# Patient Record
Sex: FEMALE | Race: WHITE | NOT HISPANIC OR LATINO
[De-identification: names, ages, dates, MRNs, and addresses within clinical notes are randomized per-mention and may not be internally consistent; named-entity substitution may affect disease eponyms.]

---

## 2015-01-06 RX ORDER — INSULIN GLARGINE 100 [IU]/ML
46 INJECTION, SOLUTION SUBCUTANEOUS
Qty: 0 | Refills: 0 | COMMUNITY
Start: 2015-01-06

## 2015-01-06 RX ORDER — INSULIN GLARGINE 100 [IU]/ML
30 INJECTION, SOLUTION SUBCUTANEOUS
Qty: 0 | Refills: 0 | COMMUNITY
Start: 2015-01-06

## 2015-04-30 RX ORDER — INSULIN LISPRO 100/ML
12 VIAL (ML) SUBCUTANEOUS
Qty: 0 | Refills: 0 | COMMUNITY
Start: 2015-04-30

## 2015-04-30 RX ORDER — INSULIN LISPRO 100/ML
15 VIAL (ML) SUBCUTANEOUS
Qty: 0 | Refills: 0 | COMMUNITY
Start: 2015-04-30

## 2017-06-07 ENCOUNTER — APPOINTMENT (OUTPATIENT)
Dept: BARIATRICS | Facility: CLINIC | Age: 52
End: 2017-06-07

## 2017-06-07 VITALS
WEIGHT: 293 LBS | BODY MASS INDEX: 48.23 KG/M2 | HEIGHT: 65.5 IN | DIASTOLIC BLOOD PRESSURE: 86 MMHG | HEART RATE: 80 BPM | SYSTOLIC BLOOD PRESSURE: 138 MMHG | TEMPERATURE: 98 F

## 2017-06-07 DIAGNOSIS — E78.00 PURE HYPERCHOLESTEROLEMIA, UNSPECIFIED: ICD-10-CM

## 2017-06-07 DIAGNOSIS — Z78.9 OTHER SPECIFIED HEALTH STATUS: ICD-10-CM

## 2017-06-07 DIAGNOSIS — E11.9 TYPE 2 DIABETES MELLITUS W/OUT COMPLICATIONS: ICD-10-CM

## 2017-06-07 DIAGNOSIS — Z84.1 FAMILY HISTORY OF DISORDERS OF KIDNEY AND URETER: ICD-10-CM

## 2017-06-07 DIAGNOSIS — Z80.1 FAMILY HISTORY OF MALIGNANT NEOPLASM OF TRACHEA, BRONCHUS AND LUNG: ICD-10-CM

## 2017-06-07 DIAGNOSIS — Z86.39 PERSONAL HISTORY OF OTHER ENDOCRINE, NUTRITIONAL AND METABOLIC DISEASE: ICD-10-CM

## 2017-06-07 DIAGNOSIS — E66.01 MORBID (SEVERE) OBESITY DUE TO EXCESS CALORIES: ICD-10-CM

## 2017-06-07 DIAGNOSIS — F15.90 OTHER STIMULANT USE, UNSPECIFIED, UNCOMPLICATED: ICD-10-CM

## 2017-06-07 DIAGNOSIS — Z83.3 FAMILY HISTORY OF DIABETES MELLITUS: ICD-10-CM

## 2017-06-07 DIAGNOSIS — Z82.49 FAMILY HISTORY OF ISCHEMIC HEART DISEASE AND OTHER DISEASES OF THE CIRCULATORY SYSTEM: ICD-10-CM

## 2017-06-07 RX ORDER — BENAZEPRIL HYDROCHLORIDE AND HYDROCHLOROTHIAZIDE 20; 25 MG/1; MG/1
20-25 TABLET, FILM COATED ORAL
Refills: 0 | Status: ACTIVE | COMMUNITY

## 2017-06-07 RX ORDER — INSULIN GLARGINE 300 U/ML
300 INJECTION, SOLUTION SUBCUTANEOUS
Refills: 0 | Status: ACTIVE | COMMUNITY
Start: 2017-06-07

## 2017-06-07 RX ORDER — EMPAGLIFLOZIN 25 MG/1
25 TABLET, FILM COATED ORAL
Refills: 0 | Status: ACTIVE | COMMUNITY

## 2017-06-07 RX ORDER — SIMVASTATIN 80 MG/1
TABLET, FILM COATED ORAL
Refills: 0 | Status: ACTIVE | COMMUNITY

## 2017-06-07 RX ORDER — DULAGLUTIDE 1.5 MG/.5ML
1.5 INJECTION, SOLUTION SUBCUTANEOUS
Refills: 0 | Status: ACTIVE | COMMUNITY

## 2017-06-07 RX ORDER — INSULIN LISPRO 100 [IU]/ML
100 INJECTION, SOLUTION INTRAVENOUS; SUBCUTANEOUS
Refills: 0 | Status: ACTIVE | COMMUNITY

## 2017-06-07 RX ORDER — ERGOCALCIFEROL (VITAMIN D2) 1250 MCG
50000 CAPSULE ORAL
Refills: 0 | Status: ACTIVE | COMMUNITY

## 2017-06-07 RX ORDER — SERTRALINE HYDROCHLORIDE 100 MG/1
100 TABLET, FILM COATED ORAL
Refills: 0 | Status: ACTIVE | COMMUNITY

## 2017-06-07 RX ORDER — ASPIRIN 81 MG/1
81 TABLET ORAL
Refills: 0 | Status: ACTIVE | COMMUNITY
Start: 2017-06-07

## 2017-06-09 LAB
25(OH)D3 SERPL-MCNC: 41.9 NG/ML
ALBUMIN SERPL ELPH-MCNC: 4.3 G/DL
ALP BLD-CCNC: 63 U/L
ALT SERPL-CCNC: 15 U/L
ANION GAP SERPL CALC-SCNC: 23 MMOL/L
APTT BLD: 31.7 SEC
AST SERPL-CCNC: 20 U/L
BASOPHILS # BLD AUTO: 0.03 K/UL
BASOPHILS NFR BLD AUTO: 0.3 %
BILIRUB SERPL-MCNC: 1.2 MG/DL
BUN SERPL-MCNC: 14 MG/DL
CALCIUM SERPL-MCNC: 10.7 MG/DL
CHLORIDE SERPL-SCNC: 99 MMOL/L
CHOLEST SERPL-MCNC: 141 MG/DL
CHOLEST/HDLC SERPL: 3 RATIO
CO2 SERPL-SCNC: 21 MMOL/L
CREAT SERPL-MCNC: 0.91 MG/DL
EOSINOPHIL # BLD AUTO: 0.42 K/UL
EOSINOPHIL NFR BLD AUTO: 4 %
FERRITIN SERPL-MCNC: 30 NG/ML
FOLATE SERPL-MCNC: 15 NG/ML
GLUCOSE SERPL-MCNC: 97 MG/DL
HBA1C MFR BLD HPLC: 6.6 %
HCT VFR BLD CALC: 46.9 %
HDLC SERPL-MCNC: 47 MG/DL
HGB BLD-MCNC: 14.7 G/DL
IMM GRANULOCYTES NFR BLD AUTO: 0.2 %
INR PPP: 0.93 RATIO
IRON SATN MFR SERPL: 16 %
IRON SERPL-MCNC: 56 UG/DL
LDLC SERPL CALC-MCNC: 55 MG/DL
LYMPHOCYTES # BLD AUTO: 2.79 K/UL
LYMPHOCYTES NFR BLD AUTO: 26.5 %
MAN DIFF?: NORMAL
MCHC RBC-ENTMCNC: 29.7 PG
MCHC RBC-ENTMCNC: 31.3 GM/DL
MCV RBC AUTO: 94.7 FL
MONOCYTES # BLD AUTO: 0.74 K/UL
MONOCYTES NFR BLD AUTO: 7 %
NEUTROPHILS # BLD AUTO: 6.53 K/UL
NEUTROPHILS NFR BLD AUTO: 62 %
PLATELET # BLD AUTO: 318 K/UL
POTASSIUM SERPL-SCNC: 4.3 MMOL/L
PROT SERPL-MCNC: 7.8 G/DL
PT BLD: 10.5 SEC
RBC # BLD: 4.95 M/UL
RBC # FLD: 13.7 %
SODIUM SERPL-SCNC: 143 MMOL/L
T4 SERPL-MCNC: 6 UG/DL
TIBC SERPL-MCNC: 352 UG/DL
TRIGL SERPL-MCNC: 193 MG/DL
TSH SERPL-ACNC: 0.62 UIU/ML
UIBC SERPL-MCNC: 296 UG/DL
VIT B12 SERPL-MCNC: 349 PG/ML
WBC # FLD AUTO: 10.53 K/UL

## 2017-06-12 LAB
VIT B1 SERPL-MCNC: 163.6 NMOL/L
VIT B6 SERPL-MCNC: 14.9 UG/L

## 2017-06-23 ENCOUNTER — OTHER (OUTPATIENT)
Age: 52
End: 2017-06-23

## 2017-07-26 ENCOUNTER — EMERGENCY (EMERGENCY)
Facility: HOSPITAL | Age: 52
LOS: 1 days | Discharge: HOME | End: 2017-07-26
Attending: EMERGENCY MEDICINE
Payer: COMMERCIAL

## 2017-07-26 VITALS
TEMPERATURE: 98 F | DIASTOLIC BLOOD PRESSURE: 64 MMHG | RESPIRATION RATE: 18 BRPM | OXYGEN SATURATION: 95 % | HEART RATE: 83 BPM | SYSTOLIC BLOOD PRESSURE: 103 MMHG

## 2017-07-26 VITALS
SYSTOLIC BLOOD PRESSURE: 127 MMHG | RESPIRATION RATE: 22 BRPM | TEMPERATURE: 98 F | HEART RATE: 103 BPM | OXYGEN SATURATION: 96 % | HEIGHT: 65 IN | DIASTOLIC BLOOD PRESSURE: 76 MMHG | WEIGHT: 293 LBS

## 2017-07-26 LAB
ANION GAP SERPL CALC-SCNC: 13 MMOL/L — SIGNIFICANT CHANGE UP (ref 5–17)
ANION GAP SERPL CALC-SCNC: 9 MMOL/L — SIGNIFICANT CHANGE UP (ref 5–17)
BASOPHILS # BLD AUTO: 0.1 K/UL — SIGNIFICANT CHANGE UP (ref 0–0.2)
BASOPHILS NFR BLD AUTO: 1.2 % — SIGNIFICANT CHANGE UP (ref 0–2)
BUN SERPL-MCNC: 16 MG/DL — SIGNIFICANT CHANGE UP (ref 7–18)
BUN SERPL-MCNC: 17 MG/DL — SIGNIFICANT CHANGE UP (ref 7–18)
CALCIUM SERPL-MCNC: 9.6 MG/DL — SIGNIFICANT CHANGE UP (ref 8.4–10.5)
CALCIUM SERPL-MCNC: 9.8 MG/DL — SIGNIFICANT CHANGE UP (ref 8.4–10.5)
CHLORIDE SERPL-SCNC: 106 MMOL/L — SIGNIFICANT CHANGE UP (ref 96–108)
CHLORIDE SERPL-SCNC: 107 MMOL/L — SIGNIFICANT CHANGE UP (ref 96–108)
CK MB CFR SERPL CALC: 1.8 NG/ML — SIGNIFICANT CHANGE UP (ref 0–3.6)
CO2 SERPL-SCNC: 22 MMOL/L — SIGNIFICANT CHANGE UP (ref 22–31)
CO2 SERPL-SCNC: 27 MMOL/L — SIGNIFICANT CHANGE UP (ref 22–31)
CREAT SERPL-MCNC: 0.93 MG/DL — SIGNIFICANT CHANGE UP (ref 0.5–1.3)
CREAT SERPL-MCNC: 1.01 MG/DL — SIGNIFICANT CHANGE UP (ref 0.5–1.3)
EOSINOPHIL # BLD AUTO: 0.4 K/UL — SIGNIFICANT CHANGE UP (ref 0–0.5)
EOSINOPHIL NFR BLD AUTO: 4.6 % — SIGNIFICANT CHANGE UP (ref 0–6)
GLUCOSE SERPL-MCNC: 127 MG/DL — HIGH (ref 70–99)
GLUCOSE SERPL-MCNC: 147 MG/DL — HIGH (ref 70–99)
HCT VFR BLD CALC: 42.8 % — SIGNIFICANT CHANGE UP (ref 34.5–45)
HGB BLD-MCNC: 14.8 G/DL — SIGNIFICANT CHANGE UP (ref 11.5–15.5)
LYMPHOCYTES # BLD AUTO: 2.9 K/UL — SIGNIFICANT CHANGE UP (ref 1–3.3)
LYMPHOCYTES # BLD AUTO: 29.8 % — SIGNIFICANT CHANGE UP (ref 13–44)
MCHC RBC-ENTMCNC: 30.5 PG — SIGNIFICANT CHANGE UP (ref 27–34)
MCHC RBC-ENTMCNC: 34.7 GM/DL — SIGNIFICANT CHANGE UP (ref 32–36)
MCV RBC AUTO: 87.9 FL — SIGNIFICANT CHANGE UP (ref 80–100)
MONOCYTES # BLD AUTO: 0.7 K/UL — SIGNIFICANT CHANGE UP (ref 0–0.9)
MONOCYTES NFR BLD AUTO: 6.7 % — SIGNIFICANT CHANGE UP (ref 2–14)
NEUTROPHILS # BLD AUTO: 5.6 K/UL — SIGNIFICANT CHANGE UP (ref 1.8–7.4)
NEUTROPHILS NFR BLD AUTO: 57.6 % — SIGNIFICANT CHANGE UP (ref 43–77)
NT-PROBNP SERPL-SCNC: 222 PG/ML — HIGH (ref 0–125)
PLATELET # BLD AUTO: 249 K/UL — SIGNIFICANT CHANGE UP (ref 150–400)
POTASSIUM SERPL-MCNC: 2.9 MMOL/L — CRITICAL LOW (ref 3.5–5.3)
POTASSIUM SERPL-MCNC: 3.4 MMOL/L — LOW (ref 3.5–5.3)
POTASSIUM SERPL-SCNC: 2.9 MMOL/L — CRITICAL LOW (ref 3.5–5.3)
POTASSIUM SERPL-SCNC: 3.4 MMOL/L — LOW (ref 3.5–5.3)
RBC # BLD: 4.86 M/UL — SIGNIFICANT CHANGE UP (ref 3.8–5.2)
RBC # FLD: 13.1 % — SIGNIFICANT CHANGE UP (ref 10.3–14.5)
SODIUM SERPL-SCNC: 141 MMOL/L — SIGNIFICANT CHANGE UP (ref 135–145)
SODIUM SERPL-SCNC: 143 MMOL/L — SIGNIFICANT CHANGE UP (ref 135–145)
TROPONIN I SERPL-MCNC: <0.015 NG/ML — SIGNIFICANT CHANGE UP (ref 0–0.04)
TROPONIN I SERPL-MCNC: <0.015 NG/ML — SIGNIFICANT CHANGE UP (ref 0–0.04)
WBC # BLD: 9.7 K/UL — SIGNIFICANT CHANGE UP (ref 3.8–10.5)
WBC # FLD AUTO: 9.7 K/UL — SIGNIFICANT CHANGE UP (ref 3.8–10.5)

## 2017-07-26 PROCEDURE — 93005 ELECTROCARDIOGRAM TRACING: CPT

## 2017-07-26 PROCEDURE — 80048 BASIC METABOLIC PNL TOTAL CA: CPT

## 2017-07-26 PROCEDURE — 71260 CT THORAX DX C+: CPT | Mod: 26

## 2017-07-26 PROCEDURE — 99285 EMERGENCY DEPT VISIT HI MDM: CPT

## 2017-07-26 PROCEDURE — 94640 AIRWAY INHALATION TREATMENT: CPT

## 2017-07-26 PROCEDURE — 71020: CPT | Mod: 26

## 2017-07-26 PROCEDURE — 71260 CT THORAX DX C+: CPT

## 2017-07-26 PROCEDURE — 84484 ASSAY OF TROPONIN QUANT: CPT

## 2017-07-26 PROCEDURE — 82553 CREATINE MB FRACTION: CPT

## 2017-07-26 PROCEDURE — 83880 ASSAY OF NATRIURETIC PEPTIDE: CPT

## 2017-07-26 PROCEDURE — 99284 EMERGENCY DEPT VISIT MOD MDM: CPT | Mod: 25

## 2017-07-26 PROCEDURE — 96374 THER/PROPH/DIAG INJ IV PUSH: CPT | Mod: XU

## 2017-07-26 PROCEDURE — 71046 X-RAY EXAM CHEST 2 VIEWS: CPT

## 2017-07-26 PROCEDURE — 85027 COMPLETE CBC AUTOMATED: CPT

## 2017-07-26 RX ORDER — POTASSIUM CHLORIDE 20 MEQ
20 PACKET (EA) ORAL ONCE
Qty: 0 | Refills: 0 | Status: COMPLETED | OUTPATIENT
Start: 2017-07-26 | End: 2017-07-26

## 2017-07-26 RX ORDER — MAGNESIUM SULFATE 500 MG/ML
2 VIAL (ML) INJECTION ONCE
Qty: 0 | Refills: 0 | Status: COMPLETED | OUTPATIENT
Start: 2017-07-26 | End: 2017-07-26

## 2017-07-26 RX ORDER — ALBUTEROL 90 UG/1
2.5 AEROSOL, METERED ORAL ONCE
Qty: 0 | Refills: 0 | Status: COMPLETED | OUTPATIENT
Start: 2017-07-26 | End: 2017-07-26

## 2017-07-26 RX ADMIN — ALBUTEROL 2.5 MILLIGRAM(S): 90 AEROSOL, METERED ORAL at 14:20

## 2017-07-26 RX ADMIN — Medication 50 GRAM(S): at 14:20

## 2017-07-26 RX ADMIN — Medication 20 MILLIEQUIVALENT(S): at 17:25

## 2017-07-26 NOTE — ED PROVIDER NOTE - PROGRESS NOTE DETAILS
kaur: no resp distress.  pt xray shows a hyperdense area at right hilum area- will obtain a ct chest with iv contrast. repeat K shows 3.4.  kcl 20meq given. 1st set of trop neg.  pending 2nd S.O. from Dr. JIA kaur f/u CT chest, pt with nonspecific 3mm nodule. Pt in no distress, no shortness of breath, no chest pain . will f/u with Dr. Christine for pulmonary consult and repeat CT in 9-12 months as per radiologist.   Pt is well appearing walking with normal gait, stable for discharge and follow up with medical doctor. Pt educated on care and need for follow up. Discussed anticipatory guidance and return precautions. Questions answered. I had a detailed discussion with the patient and/or guardian regarding the historical points, exam findings, and any diagnostic results supporting the discharge diagnosis.

## 2017-07-26 NOTE — ED ADULT NURSE NOTE - OBJECTIVE STATEMENT
pt BIBEMS c/o of wheezing with SOB at doctor's office today pt is alert awake oriented x3 shallow breathing with wheezing noted throughout no coughing at this time

## 2017-07-26 NOTE — ED PROVIDER NOTE - MEDICAL DECISION MAKING DETAILS
52 yr old female with hx of HTN, HLD, DM and ? asthma? presents to ed c/o SOB and wheezing x 1 wk worsening.  no fever, no cp, no palpitations, no smoking hx, no diaphoresis, no n/v.  pt was at MD office today and given albuterol x 2 nebs and 80mg solumedrol IM.  EMS arrived and gave pt 2 duoneb and pt showed significant improvement.  speaking clearly in full sentences and good air entry.  denies any uri sx, smoke exposure, dvt/pe, recent surgery.     asthmatic sx r/o cardiac vs pna vs copd vs asthma

## 2017-07-26 NOTE — ED ADULT TRIAGE NOTE - CHIEF COMPLAINT QUOTE
biba sent from pmd office for sob / wheezing . ems reports pt was given albuterol x 2 and solumedrol 80 mg IM at the doctor's office and duonebs x 2 by ems

## 2017-07-26 NOTE — ED PROVIDER NOTE - CONSTITUTIONAL, MLM
normal... well nourished, awake, alert, oriented to person, place, time/situation and in mild apparent distress.

## 2017-07-29 DIAGNOSIS — Z79.2 LONG TERM (CURRENT) USE OF ANTIBIOTICS: ICD-10-CM

## 2017-07-29 DIAGNOSIS — I10 ESSENTIAL (PRIMARY) HYPERTENSION: ICD-10-CM

## 2017-07-29 DIAGNOSIS — Z79.82 LONG TERM (CURRENT) USE OF ASPIRIN: ICD-10-CM

## 2017-07-29 DIAGNOSIS — E11.9 TYPE 2 DIABETES MELLITUS WITHOUT COMPLICATIONS: ICD-10-CM

## 2017-07-29 DIAGNOSIS — R06.00 DYSPNEA, UNSPECIFIED: ICD-10-CM

## 2017-07-29 DIAGNOSIS — R91.1 SOLITARY PULMONARY NODULE: ICD-10-CM

## 2017-07-29 DIAGNOSIS — R06.2 WHEEZING: ICD-10-CM

## 2017-07-29 DIAGNOSIS — Z79.4 LONG TERM (CURRENT) USE OF INSULIN: ICD-10-CM

## 2017-07-29 DIAGNOSIS — E78.5 HYPERLIPIDEMIA, UNSPECIFIED: ICD-10-CM

## 2018-02-13 ENCOUNTER — INPATIENT (INPATIENT)
Facility: HOSPITAL | Age: 53
LOS: 7 days | Discharge: HOME | DRG: 638 | End: 2018-02-21
Attending: INTERNAL MEDICINE | Admitting: INTERNAL MEDICINE
Payer: MEDICARE

## 2018-02-13 VITALS
RESPIRATION RATE: 16 BRPM | DIASTOLIC BLOOD PRESSURE: 77 MMHG | SYSTOLIC BLOOD PRESSURE: 134 MMHG | TEMPERATURE: 98 F | HEART RATE: 80 BPM | HEIGHT: 66 IN | OXYGEN SATURATION: 96 % | WEIGHT: 289.91 LBS

## 2018-02-13 RX ORDER — SODIUM CHLORIDE 9 MG/ML
3 INJECTION INTRAMUSCULAR; INTRAVENOUS; SUBCUTANEOUS ONCE
Qty: 0 | Refills: 0 | Status: COMPLETED | OUTPATIENT
Start: 2018-02-13 | End: 2018-02-13

## 2018-02-13 NOTE — ED PROVIDER NOTE - OBJECTIVE STATEMENT
51 y/o F pt w/ PMHx of DM (Meloxicam 15 mg x 1 daily, Jardiance x 25 mg, aspirin x 81 mg, Sertraline HCl 100 mg, Metropolol Succina 50 mg, Simvastin 40 mg, Benazepril/HCT 20 12.5 mg, Metformin HCl 850 mg, Trulicity, Toujeo Solostar) foot ulcers, neuropathy secondary  HTN c/o L ankle swelling. Reports intermittent fever since yesterday. Denies any other complaints. Allergic to iodinated radiocontrast agents.

## 2018-02-13 NOTE — ED PROVIDER NOTE - CARE PLAN
Principal Discharge DX:	Diabetic foot infection  Secondary Diagnosis:	Uncontrolled diabetes mellitus

## 2018-02-13 NOTE — ED ADULT TRIAGE NOTE - CHIEF COMPLAINT QUOTE
pt c/o swelling, redness and pain in left foot, pt states she has foot ulcers in both feet and she is diabetic

## 2018-02-13 NOTE — ED PROVIDER NOTE - MEDICAL DECISION MAKING DETAILS
MAR endorsed. Pt agrees with admission for glucose monitoring and IV abx. I had a detailed discussion with the patient and/or guardian regarding the historical points, exam findings, and any diagnostic results supporting the admit diagnosis.

## 2018-02-14 DIAGNOSIS — I10 ESSENTIAL (PRIMARY) HYPERTENSION: ICD-10-CM

## 2018-02-14 DIAGNOSIS — L03.031 CELLULITIS OF RIGHT TOE: ICD-10-CM

## 2018-02-14 DIAGNOSIS — E11.65 TYPE 2 DIABETES MELLITUS WITH HYPERGLYCEMIA: ICD-10-CM

## 2018-02-14 DIAGNOSIS — Z29.9 ENCOUNTER FOR PROPHYLACTIC MEASURES, UNSPECIFIED: ICD-10-CM

## 2018-02-14 DIAGNOSIS — E11.69 TYPE 2 DIABETES MELLITUS WITH OTHER SPECIFIED COMPLICATION: ICD-10-CM

## 2018-02-14 DIAGNOSIS — E11.9 TYPE 2 DIABETES MELLITUS WITHOUT COMPLICATIONS: ICD-10-CM

## 2018-02-14 LAB
ACETONE SERPL-MCNC: NEGATIVE — SIGNIFICANT CHANGE UP
ALBUMIN SERPL ELPH-MCNC: 3.5 G/DL — SIGNIFICANT CHANGE UP (ref 3.5–5)
ALP SERPL-CCNC: 130 U/L — HIGH (ref 40–120)
ALT FLD-CCNC: 24 U/L DA — SIGNIFICANT CHANGE UP (ref 10–60)
ANION GAP SERPL CALC-SCNC: 4 MMOL/L — LOW (ref 5–17)
ANION GAP SERPL CALC-SCNC: 8 MMOL/L — SIGNIFICANT CHANGE UP (ref 5–17)
APPEARANCE UR: ABNORMAL
AST SERPL-CCNC: 17 U/L — SIGNIFICANT CHANGE UP (ref 10–40)
BASOPHILS # BLD AUTO: 0.1 K/UL — SIGNIFICANT CHANGE UP (ref 0–0.2)
BASOPHILS # BLD AUTO: 0.1 K/UL — SIGNIFICANT CHANGE UP (ref 0–0.2)
BASOPHILS NFR BLD AUTO: 0.6 % — SIGNIFICANT CHANGE UP (ref 0–2)
BASOPHILS NFR BLD AUTO: 0.7 % — SIGNIFICANT CHANGE UP (ref 0–2)
BILIRUB SERPL-MCNC: 1 MG/DL — SIGNIFICANT CHANGE UP (ref 0.2–1.2)
BILIRUB UR-MCNC: NEGATIVE — SIGNIFICANT CHANGE UP
BUN SERPL-MCNC: 13 MG/DL — SIGNIFICANT CHANGE UP (ref 7–18)
BUN SERPL-MCNC: 16 MG/DL — SIGNIFICANT CHANGE UP (ref 7–18)
CALCIUM SERPL-MCNC: 8.9 MG/DL — SIGNIFICANT CHANGE UP (ref 8.4–10.5)
CALCIUM SERPL-MCNC: 9.1 MG/DL — SIGNIFICANT CHANGE UP (ref 8.4–10.5)
CHLORIDE SERPL-SCNC: 102 MMOL/L — SIGNIFICANT CHANGE UP (ref 96–108)
CHLORIDE SERPL-SCNC: 104 MMOL/L — SIGNIFICANT CHANGE UP (ref 96–108)
CHOLEST SERPL-MCNC: 134 MG/DL — SIGNIFICANT CHANGE UP (ref 10–199)
CO2 SERPL-SCNC: 28 MMOL/L — SIGNIFICANT CHANGE UP (ref 22–31)
CO2 SERPL-SCNC: 31 MMOL/L — SIGNIFICANT CHANGE UP (ref 22–31)
COLOR SPEC: YELLOW — SIGNIFICANT CHANGE UP
CREAT SERPL-MCNC: 0.69 MG/DL — SIGNIFICANT CHANGE UP (ref 0.5–1.3)
CREAT SERPL-MCNC: 0.92 MG/DL — SIGNIFICANT CHANGE UP (ref 0.5–1.3)
DIFF PNL FLD: ABNORMAL
EOSINOPHIL # BLD AUTO: 0.9 K/UL — HIGH (ref 0–0.5)
EOSINOPHIL # BLD AUTO: 1 K/UL — HIGH (ref 0–0.5)
EOSINOPHIL NFR BLD AUTO: 8.6 % — HIGH (ref 0–6)
EOSINOPHIL NFR BLD AUTO: 9.9 % — HIGH (ref 0–6)
FOLATE SERPL-MCNC: 17.6 NG/ML — SIGNIFICANT CHANGE UP (ref 4.8–24.2)
GLUCOSE BLDC GLUCOMTR-MCNC: 142 MG/DL — HIGH (ref 70–99)
GLUCOSE BLDC GLUCOMTR-MCNC: 158 MG/DL — HIGH (ref 70–99)
GLUCOSE BLDC GLUCOMTR-MCNC: 223 MG/DL — HIGH (ref 70–99)
GLUCOSE SERPL-MCNC: 132 MG/DL — HIGH (ref 70–99)
GLUCOSE SERPL-MCNC: 338 MG/DL — HIGH (ref 70–99)
GLUCOSE UR QL: 1000 MG/DL
HCT VFR BLD CALC: 42.8 % — SIGNIFICANT CHANGE UP (ref 34.5–45)
HCT VFR BLD CALC: 45.2 % — HIGH (ref 34.5–45)
HDLC SERPL-MCNC: 35 MG/DL — LOW (ref 40–125)
HGB BLD-MCNC: 13.4 G/DL — SIGNIFICANT CHANGE UP (ref 11.5–15.5)
HGB BLD-MCNC: 13.8 G/DL — SIGNIFICANT CHANGE UP (ref 11.5–15.5)
KETONES UR-MCNC: NEGATIVE — SIGNIFICANT CHANGE UP
LACTATE SERPL-SCNC: 2 MMOL/L — SIGNIFICANT CHANGE UP (ref 0.7–2)
LEUKOCYTE ESTERASE UR-ACNC: ABNORMAL
LIPID PNL WITH DIRECT LDL SERPL: 60 MG/DL — SIGNIFICANT CHANGE UP
LYMPHOCYTES # BLD AUTO: 2.5 K/UL — SIGNIFICANT CHANGE UP (ref 1–3.3)
LYMPHOCYTES # BLD AUTO: 25.3 % — SIGNIFICANT CHANGE UP (ref 13–44)
LYMPHOCYTES # BLD AUTO: 28.7 % — SIGNIFICANT CHANGE UP (ref 13–44)
LYMPHOCYTES # BLD AUTO: 3 K/UL — SIGNIFICANT CHANGE UP (ref 1–3.3)
MAGNESIUM SERPL-MCNC: 1.7 MG/DL — SIGNIFICANT CHANGE UP (ref 1.6–2.6)
MCHC RBC-ENTMCNC: 28.2 PG — SIGNIFICANT CHANGE UP (ref 27–34)
MCHC RBC-ENTMCNC: 28.5 PG — SIGNIFICANT CHANGE UP (ref 27–34)
MCHC RBC-ENTMCNC: 30.7 GM/DL — LOW (ref 32–36)
MCHC RBC-ENTMCNC: 31.3 GM/DL — LOW (ref 32–36)
MCV RBC AUTO: 91 FL — SIGNIFICANT CHANGE UP (ref 80–100)
MCV RBC AUTO: 91.9 FL — SIGNIFICANT CHANGE UP (ref 80–100)
MONOCYTES # BLD AUTO: 0.8 K/UL — SIGNIFICANT CHANGE UP (ref 0–0.9)
MONOCYTES # BLD AUTO: 1 K/UL — HIGH (ref 0–0.9)
MONOCYTES NFR BLD AUTO: 10.5 % — SIGNIFICANT CHANGE UP (ref 2–14)
MONOCYTES NFR BLD AUTO: 7.4 % — SIGNIFICANT CHANGE UP (ref 2–14)
NEUTROPHILS # BLD AUTO: 5.2 K/UL — SIGNIFICANT CHANGE UP (ref 1.8–7.4)
NEUTROPHILS # BLD AUTO: 5.7 K/UL — SIGNIFICANT CHANGE UP (ref 1.8–7.4)
NEUTROPHILS NFR BLD AUTO: 53.7 % — SIGNIFICANT CHANGE UP (ref 43–77)
NEUTROPHILS NFR BLD AUTO: 54.5 % — SIGNIFICANT CHANGE UP (ref 43–77)
NITRITE UR-MCNC: NEGATIVE — SIGNIFICANT CHANGE UP
PH UR: 5 — SIGNIFICANT CHANGE UP (ref 5–8)
PLATELET # BLD AUTO: 229 K/UL — SIGNIFICANT CHANGE UP (ref 150–400)
PLATELET # BLD AUTO: 269 K/UL — SIGNIFICANT CHANGE UP (ref 150–400)
POTASSIUM SERPL-MCNC: 3.5 MMOL/L — SIGNIFICANT CHANGE UP (ref 3.5–5.3)
POTASSIUM SERPL-MCNC: 3.7 MMOL/L — SIGNIFICANT CHANGE UP (ref 3.5–5.3)
POTASSIUM SERPL-SCNC: 3.5 MMOL/L — SIGNIFICANT CHANGE UP (ref 3.5–5.3)
POTASSIUM SERPL-SCNC: 3.7 MMOL/L — SIGNIFICANT CHANGE UP (ref 3.5–5.3)
PROT SERPL-MCNC: 7.9 G/DL — SIGNIFICANT CHANGE UP (ref 6–8.3)
PROT UR-MCNC: 15
RBC # BLD: 4.71 M/UL — SIGNIFICANT CHANGE UP (ref 3.8–5.2)
RBC # BLD: 4.91 M/UL — SIGNIFICANT CHANGE UP (ref 3.8–5.2)
RBC # FLD: 12.8 % — SIGNIFICANT CHANGE UP (ref 10.3–14.5)
RBC # FLD: 12.8 % — SIGNIFICANT CHANGE UP (ref 10.3–14.5)
SODIUM SERPL-SCNC: 138 MMOL/L — SIGNIFICANT CHANGE UP (ref 135–145)
SODIUM SERPL-SCNC: 139 MMOL/L — SIGNIFICANT CHANGE UP (ref 135–145)
SP GR SPEC: 1.01 — SIGNIFICANT CHANGE UP (ref 1.01–1.02)
TOTAL CHOLESTEROL/HDL RATIO MEASUREMENT: 3.8 RATIO — SIGNIFICANT CHANGE UP (ref 3.3–7.1)
TRIGL SERPL-MCNC: 195 MG/DL — HIGH (ref 10–149)
TSH SERPL-MCNC: 1.42 UU/ML — SIGNIFICANT CHANGE UP (ref 0.34–4.82)
UROBILINOGEN FLD QL: NEGATIVE — SIGNIFICANT CHANGE UP
VIT B12 SERPL-MCNC: 401 PG/ML — SIGNIFICANT CHANGE UP (ref 232–1245)
WBC # BLD: 10.4 K/UL — SIGNIFICANT CHANGE UP (ref 3.8–10.5)
WBC # BLD: 9.7 K/UL — SIGNIFICANT CHANGE UP (ref 3.8–10.5)
WBC # FLD AUTO: 10.4 K/UL — SIGNIFICANT CHANGE UP (ref 3.8–10.5)
WBC # FLD AUTO: 9.7 K/UL — SIGNIFICANT CHANGE UP (ref 3.8–10.5)

## 2018-02-14 PROCEDURE — 93925 LOWER EXTREMITY STUDY: CPT | Mod: 26

## 2018-02-14 PROCEDURE — 71045 X-RAY EXAM CHEST 1 VIEW: CPT | Mod: 26

## 2018-02-14 PROCEDURE — 99285 EMERGENCY DEPT VISIT HI MDM: CPT | Mod: 25

## 2018-02-14 PROCEDURE — 73630 X-RAY EXAM OF FOOT: CPT | Mod: 26,50

## 2018-02-14 RX ORDER — ENOXAPARIN SODIUM 100 MG/ML
40 INJECTION SUBCUTANEOUS DAILY
Qty: 0 | Refills: 0 | Status: DISCONTINUED | OUTPATIENT
Start: 2018-02-14 | End: 2018-02-21

## 2018-02-14 RX ORDER — INSULIN GLARGINE 100 [IU]/ML
30 INJECTION, SOLUTION SUBCUTANEOUS AT BEDTIME
Qty: 0 | Refills: 0 | Status: DISCONTINUED | OUTPATIENT
Start: 2018-02-14 | End: 2018-02-15

## 2018-02-14 RX ORDER — INSULIN LISPRO 100/ML
15 VIAL (ML) SUBCUTANEOUS
Qty: 0 | Refills: 0 | Status: DISCONTINUED | OUTPATIENT
Start: 2018-02-14 | End: 2018-02-21

## 2018-02-14 RX ORDER — INSULIN GLARGINE 100 [IU]/ML
18 INJECTION, SOLUTION SUBCUTANEOUS AT BEDTIME
Qty: 0 | Refills: 0 | Status: DISCONTINUED | OUTPATIENT
Start: 2018-02-14 | End: 2018-02-14

## 2018-02-14 RX ORDER — AMLODIPINE BESYLATE 2.5 MG/1
5 TABLET ORAL DAILY
Qty: 0 | Refills: 0 | Status: DISCONTINUED | OUTPATIENT
Start: 2018-02-14 | End: 2018-02-14

## 2018-02-14 RX ORDER — FLUCONAZOLE 150 MG/1
200 TABLET ORAL DAILY
Qty: 0 | Refills: 0 | Status: COMPLETED | OUTPATIENT
Start: 2018-02-14 | End: 2018-02-19

## 2018-02-14 RX ORDER — LISINOPRIL 2.5 MG/1
5 TABLET ORAL DAILY
Qty: 0 | Refills: 0 | Status: DISCONTINUED | OUTPATIENT
Start: 2018-02-14 | End: 2018-02-20

## 2018-02-14 RX ORDER — SERTRALINE 25 MG/1
100 TABLET, FILM COATED ORAL DAILY
Qty: 0 | Refills: 0 | Status: DISCONTINUED | OUTPATIENT
Start: 2018-02-14 | End: 2018-02-21

## 2018-02-14 RX ORDER — VANCOMYCIN HCL 1 G
1000 VIAL (EA) INTRAVENOUS ONCE
Qty: 0 | Refills: 0 | Status: COMPLETED | OUTPATIENT
Start: 2018-02-14 | End: 2018-02-14

## 2018-02-14 RX ORDER — INSULIN LISPRO 100/ML
VIAL (ML) SUBCUTANEOUS
Qty: 0 | Refills: 0 | Status: DISCONTINUED | OUTPATIENT
Start: 2018-02-14 | End: 2018-02-21

## 2018-02-14 RX ORDER — ASPIRIN/CALCIUM CARB/MAGNESIUM 324 MG
81 TABLET ORAL DAILY
Qty: 0 | Refills: 0 | Status: DISCONTINUED | OUTPATIENT
Start: 2018-02-14 | End: 2018-02-14

## 2018-02-14 RX ORDER — SIMVASTATIN 20 MG/1
10 TABLET, FILM COATED ORAL AT BEDTIME
Qty: 0 | Refills: 0 | Status: DISCONTINUED | OUTPATIENT
Start: 2018-02-14 | End: 2018-02-21

## 2018-02-14 RX ORDER — SIMVASTATIN 20 MG/1
40 TABLET, FILM COATED ORAL AT BEDTIME
Qty: 0 | Refills: 0 | Status: DISCONTINUED | OUTPATIENT
Start: 2018-02-14 | End: 2018-02-14

## 2018-02-14 RX ORDER — INSULIN HUMAN 100 [IU]/ML
6 INJECTION, SOLUTION SUBCUTANEOUS ONCE
Qty: 0 | Refills: 0 | Status: COMPLETED | OUTPATIENT
Start: 2018-02-14 | End: 2018-02-14

## 2018-02-14 RX ORDER — ASPIRIN/CALCIUM CARB/MAGNESIUM 324 MG
81 TABLET ORAL DAILY
Qty: 0 | Refills: 0 | Status: DISCONTINUED | OUTPATIENT
Start: 2018-02-14 | End: 2018-02-21

## 2018-02-14 RX ORDER — GEMFIBROZIL 600 MG
600 TABLET ORAL
Qty: 0 | Refills: 0 | Status: DISCONTINUED | OUTPATIENT
Start: 2018-02-14 | End: 2018-02-21

## 2018-02-14 RX ORDER — PIPERACILLIN AND TAZOBACTAM 4; .5 G/20ML; G/20ML
3.38 INJECTION, POWDER, LYOPHILIZED, FOR SOLUTION INTRAVENOUS EVERY 8 HOURS
Qty: 0 | Refills: 0 | Status: DISCONTINUED | OUTPATIENT
Start: 2018-02-14 | End: 2018-02-21

## 2018-02-14 RX ORDER — PIPERACILLIN AND TAZOBACTAM 4; .5 G/20ML; G/20ML
3.38 INJECTION, POWDER, LYOPHILIZED, FOR SOLUTION INTRAVENOUS ONCE
Qty: 0 | Refills: 0 | Status: COMPLETED | OUTPATIENT
Start: 2018-02-14 | End: 2018-02-14

## 2018-02-14 RX ADMIN — Medication 15 UNIT(S): at 07:52

## 2018-02-14 RX ADMIN — INSULIN GLARGINE 30 UNIT(S): 100 INJECTION, SOLUTION SUBCUTANEOUS at 21:53

## 2018-02-14 RX ADMIN — Medication 15 UNIT(S): at 12:10

## 2018-02-14 RX ADMIN — SODIUM CHLORIDE 3 MILLILITER(S): 9 INJECTION INTRAMUSCULAR; INTRAVENOUS; SUBCUTANEOUS at 03:21

## 2018-02-14 RX ADMIN — PIPERACILLIN AND TAZOBACTAM 25 GRAM(S): 4; .5 INJECTION, POWDER, LYOPHILIZED, FOR SOLUTION INTRAVENOUS at 21:53

## 2018-02-14 RX ADMIN — FLUCONAZOLE 200 MILLIGRAM(S): 150 TABLET ORAL at 13:46

## 2018-02-14 RX ADMIN — Medication 81 MILLIGRAM(S): at 12:10

## 2018-02-14 RX ADMIN — Medication 250 MILLIGRAM(S): at 03:21

## 2018-02-14 RX ADMIN — SIMVASTATIN 10 MILLIGRAM(S): 20 TABLET, FILM COATED ORAL at 21:53

## 2018-02-14 RX ADMIN — Medication 600 MILLIGRAM(S): at 17:01

## 2018-02-14 RX ADMIN — SERTRALINE 100 MILLIGRAM(S): 25 TABLET, FILM COATED ORAL at 12:10

## 2018-02-14 RX ADMIN — Medication 1: at 17:06

## 2018-02-14 RX ADMIN — PIPERACILLIN AND TAZOBACTAM 25 GRAM(S): 4; .5 INJECTION, POWDER, LYOPHILIZED, FOR SOLUTION INTRAVENOUS at 16:17

## 2018-02-14 RX ADMIN — ENOXAPARIN SODIUM 40 MILLIGRAM(S): 100 INJECTION SUBCUTANEOUS at 12:10

## 2018-02-14 RX ADMIN — Medication 2: at 21:46

## 2018-02-14 RX ADMIN — Medication 15 UNIT(S): at 17:02

## 2018-02-14 RX ADMIN — INSULIN HUMAN 6 UNIT(S): 100 INJECTION, SOLUTION SUBCUTANEOUS at 03:02

## 2018-02-14 NOTE — H&P ADULT - ASSESSMENT
52 yr old F from home, lives with son, ambulates independently with former smoker, PMH of IDDM, HTN, HLD, peripheral neuropathy, chronic back and leg pain came with complain b/l foot ulcer with erythema of foot with left ankle swelling since Saturday. She states that she developed two open ulcer b/l on 4th toes for which she was doing dressing with silver sulfazine. she progressively develop erythema, warmth and swelling of her left ankle then she decided to come hospital. She also had fever of 100F on Saturday She denies cough, dyspnea, chest pain, abdominal pain, foot pain, N/V urinary or bowel complains, She has similar episodes of cellulitis of foot in past and was one time diagnosed with osteomyelitis was treated with long term antibiotic via PICC line. She has also been treated with Augmentin 1 month ago for similar type of cellulitis.     In ED, Patient's vitals sign were stable,  Labs grossly normal. S/p one dose of vancomycin an zosyn. When patient seen by me she was sitting comfortably,     Patient is admitted for concerns of cellulitis

## 2018-02-14 NOTE — H&P ADULT - NSHPPHYSICALEXAM_GEN_ALL_CORE
Vital Signs Last 24 Hrs  T(C): 36.7 (13 Feb 2018 22:06), Max: 36.7 (13 Feb 2018 22:06)  T(F): 98.1 (13 Feb 2018 22:06), Max: 98.1 (13 Feb 2018 22:06)  HR: 80 (13 Feb 2018 22:06) (80 - 80)  BP: 134/77 (13 Feb 2018 22:06) (134/77 - 134/77)  BP(mean): --  RR: 16 (13 Feb 2018 22:06) (16 - 16)  SpO2: 96% (13 Feb 2018 22:06) (96% - 96%)    PHYSICAL EXAM:  GENERAL: NAD, obese  HEAD:  Atraumatic, Normocephalic  EYES:  conjunctiva and sclera clear  NECK: Supple, No JVD, Normal thyroid  CHEST/LUNG: Clear to percussion bilaterally; No rales, rhonchi, wheezing, or rubs  HEART: Regular rate and rhythm; No murmurs, rubs, or gallops  ABDOMEN: Soft, Nontender, Nondistended; Bowel sounds present  NERVOUS SYSTEM:  Alert & Oriented X3,; Motor Strength 5/5 B/L   EXTREMITIES:  2+ Peripheral Pulses, No clubbing, cyanosis, or edema  SKIN; Vital Signs Last 24 Hrs  T(C): 36.7 (13 Feb 2018 22:06), Max: 36.7 (13 Feb 2018 22:06)  T(F): 98.1 (13 Feb 2018 22:06), Max: 98.1 (13 Feb 2018 22:06)  HR: 80 (13 Feb 2018 22:06) (80 - 80)  BP: 134/77 (13 Feb 2018 22:06) (134/77 - 134/77)  BP(mean): --  RR: 16 (13 Feb 2018 22:06) (16 - 16)  SpO2: 96% (13 Feb 2018 22:06) (96% - 96%)    PHYSICAL EXAM:  GENERAL: NAD, obese  HEAD:  Atraumatic, Normocephalic  EYES:  conjunctiva and sclera clear  NECK: Supple, No JVD, Normal thyroid  CHEST/LUNG: Clear to percussion bilaterally; No rales, rhonchi, wheezing, or rubs  HEART: Regular rate and rhythm; No murmurs, rubs, or gallops  ABDOMEN: Soft, Nontender, Nondistended; Bowel sounds present  NERVOUS SYSTEM:  Alert & Oriented X3,; Motor Strength 5/5 B/L   EXTREMITIES:  2+ Peripheral Pulses, No clubbing, cyanosis, or edema, ulcer on 4th toe nails b/l with erythema, swelling and warmth. Left lateral side of foot is also swollen, erythema and warmth noted.  fissuring in interdigital toe spaces, no evidence of superficial sken infection -->tinea pedis and eczema  S/p one dose of vancomycin and zosyn  -Patient has been treated with multiple antibiotics in past ,Augmentin was giv  SKIN; warm, dry Vital Signs Last 24 Hrs  T(C): 36.7 (13 Feb 2018 22:06), Max: 36.7 (13 Feb 2018 22:06)  T(F): 98.1 (13 Feb 2018 22:06), Max: 98.1 (13 Feb 2018 22:06)  HR: 80 (13 Feb 2018 22:06) (80 - 80)  BP: 134/77 (13 Feb 2018 22:06) (134/77 - 134/77)  BP(mean): --  RR: 16 (13 Feb 2018 22:06) (16 - 16)  SpO2: 96% (13 Feb 2018 22:06) (96% - 96%)    PHYSICAL EXAM:  GENERAL: NAD, obese  HEAD:  Atraumatic, Normocephalic  EYES:  conjunctiva and sclera clear  NECK: Supple, No JVD, Normal thyroid  CHEST/LUNG: Clear to percussion bilaterally; No rales, rhonchi, wheezing, or rubs  HEART: Regular rate and rhythm; No murmurs, rubs, or gallops  ABDOMEN: Soft, Nontender, Nondistended; Bowel sounds present  NERVOUS SYSTEM:  Alert & Oriented X3,; Motor Strength 5/5 B/L   EXTREMITIES:  2+ Peripheral Pulses, No clubbing, cyanosis, or edema, ulcer on 4th toe nails b/l with erythema, swelling and warmth. Left lateral side of foot is also swollen, erythema and warmth noted.  fissuring in interdigital toe spaces, no evidence of superficial sken infection -->tinea pedis and eczema  SKIN; warm, dry Vital Signs Last 24 Hrs  T(C): 36.7 (13 Feb 2018 22:06), Max: 36.7 (13 Feb 2018 22:06)  T(F): 98.1 (13 Feb 2018 22:06), Max: 98.1 (13 Feb 2018 22:06)  HR: 80 (13 Feb 2018 22:06) (80 - 80)  BP: 134/77 (13 Feb 2018 22:06) (134/77 - 134/77)  BP(mean): --  RR: 16 (13 Feb 2018 22:06) (16 - 16)  SpO2: 96% (13 Feb 2018 22:06) (96% - 96%)    PHYSICAL EXAM:  GENERAL: NAD, obese  HEAD:  Atraumatic, Normocephalic  EYES:  conjunctiva and sclera clear  NECK: Supple, No JVD, Normal thyroid  CHEST/LUNG: Clear to percussion bilaterally; No rales, rhonchi, wheezing, or rubs  HEART: Regular rate and rhythm; No murmurs, rubs, or gallops  ABDOMEN: Soft, Nontender, Nondistended; Bowel sounds present  NERVOUS SYSTEM:  Alert & Oriented X3,; Motor Strength 5/5 B/L   EXTREMITIES:  2+ Peripheral Pulses, No clubbing, cyanosis, or edema, ulcer on 4th toe nails b/l with erythema, swelling and warmth. Left lateral side of foot is also swollen, erythema and warmth noted.  fissuring in interdigital toe spaces, no evidence of superficial skin infection -->tinea pedis and eczema  SKIN; warm, dry

## 2018-02-14 NOTE — H&P ADULT - NSHPLABSRESULTS_GEN_ALL_CORE
CBC Full  -  ( 14 Feb 2018 01:52 )  WBC Count : 10.4 K/uL  Hemoglobin : 13.8 g/dL  Hematocrit : 45.2 %  Platelet Count - Automated : 269 K/uL  Mean Cell Volume : 91.9 fl  Mean Cell Hemoglobin : 28.2 pg  Mean Cell Hemoglobin Concentration : 30.7 gm/dL  Auto Neutrophil # : 5.7 K/uL  Auto Lymphocyte # : 3.0 K/uL  Auto Monocyte # : 0.8 K/uL  Auto Eosinophil # : 0.9 K/uL  Auto Basophil # : 0.1 K/uL  Auto Neutrophil % : 54.5 %  Auto Lymphocyte % : 28.7 %  Auto Monocyte % : 7.4 %  Auto Eosinophil % : 8.6 %  Auto Basophil % : 0.7 %    02-14    138  |  102  |  16  ----------------------------<  338<H>  3.7   |  28  |  0.92    Ca    9.1      14 Feb 2018 01:52    TPro  7.9  /  Alb  3.5  /  TBili  1.0  /  DBili  x   /  AST  17  /  ALT  24  /  AlkPhos  130<H>  02-14

## 2018-02-14 NOTE — H&P ADULT - PROBLEM SELECTOR PLAN 1
Patient c/o B/L foot ulcers with Erythema, warmth, swelling of left lateral side of foot, and 4th toes b/l  On PE: ulcer on 4th toe nails b/l with erythema, swelling and warmth. Left lateral side of foot is also swollen, erythema and warmth noted.  S/p one dose of vancomycin and zosyn Patient c/o B/L foot ulcers with Erythema, warmth, swelling of left lateral side of foot, and 4th toes b/l  On PE: ulcer on 4th toe nails b/l with erythema, swelling and warmth. Left lateral side of foot is also swollen, erythema and warmth noted.  fissuring in interdigital toe spaces, no evidence of superficial skein infection -->tinea pedis and eczema  S/p one dose of vancomycin and zosyn  -Patient has been treated with multiple antibiotics in past ,Augmentin was given 1 month ago.  -Will continue zosyn  -Predisposing factors for development of cellulitis, obesity, DM, peripheral neuropathy  -Xray shows no erosions  -F/u Blood cultures  ID: Dr. Mason Patient c/o B/L foot ulcers with Erythema, warmth, swelling of left lateral side of foot, and 4th toes b/l  On PE: ulcer on 4th toe nails b/l with erythema, swelling and warmth. Left lateral side of foot is also swollen, erythema and warmth noted.  fissuring in interdigital toe spaces, no evidence of superficial skein infection -->tinea pedis and eczema  S/p one dose of vancomycin and zosyn  -Patient has been treated with multiple antibiotics in past ,Augmentin was given 1 month ago.  -Will continue zosyn  -Predisposing factors for development of cellulitis, obesity, DM, peripheral neuropathy  -Xray shows no erosions  -F/u Blood cultures  ID: Dr. Mason  Podiatry consutl  Consult to Dr. Han as she may need cardio clearance for any type surgery if podiatry will do Patient c/o B/L foot ulcers with Erythema, warmth, swelling of left lateral side of foot, and 4th toes b/l  On PE: ulcer on 4th toe nails b/l with erythema, swelling and warmth. Left lateral side of foot is also swollen, erythema and warmth noted.  fissuring in interdigital toe spaces, no evidence of superficial skein infection -->tinea pedis and eczema  S/p one dose of vancomycin and zosyn  -Patient has been treated with multiple antibiotics in past ,Augmentin was given 1 month ago.  -Will continue zosyn  -Predisposing factors for development of cellulitis, obesity, DM, peripheral neuropathy  -Xray shows no erosions  -F/u Blood cultures  ID: Dr. Mason  Podiatry consult  Consult to Dr. Han as she may need cardio clearance for any type surgery if podiatry will do

## 2018-02-14 NOTE — CONSULT NOTE ADULT - PROBLEM SELECTOR RECOMMENDATION 2
cont iv abx
1- Blood sugar monitoring and control.  2- Accu-Cheks with coverage.  3- 1800 tana ADA diet.  4- Follow HB A1C.

## 2018-02-14 NOTE — H&P ADULT - FAMILY HISTORY
Family history of diabetes mellitus     Father  Still living? Unknown  Family history of lung cancer, Age at diagnosis: Age Unknown

## 2018-02-14 NOTE — ED ADULT NURSE NOTE - OBJECTIVE STATEMENT
pt c/o swelling, redness and pain in left foot, pt states she has foot ulcers in both feet and she is diabetic pt p/w swelling, redness and pain in left foot, pt states she has foot ulcers in both feet and she is diabetic

## 2018-02-14 NOTE — CHART NOTE - NSCHARTNOTEFT_GEN_A_CORE
ID, Endo, Podiatry following, appreciated    MRI form given to MRI    MRI R & L feet pending-f/u results    Carotid US pending-f/u results     Blood cx pending-f/u results ID, Endo, Podiatry following, appreciated    MRI form given to MRI    MRI R & L feet pending-f/u results    ESR-pending f/u results    Carotid US pending-f/u results     Blood cx pending-f/u results

## 2018-02-14 NOTE — H&P ADULT - PROBLEM SELECTOR PLAN 2
Poorly controlled diabetes  As per patient last HbA1C was 6.5, and she is compliant to her medication,  She takes metformin, trulicity, toujeo (36 units) Humalog (15 units) Jardiance.  Will continue HSS, half home dose of lantus and humalog.   Monitor fingersticks  F/u HbA1C Poorly controlled diabetes  As per patient last HbA1C was 6.5, and she is compliant to her medication,  She takes metformin, trulicity, toujeo (36 units) Humalog (15 units) Jardiance.  Will continue HSS, half home dose of lantus and humalog.   Monitor fingersticks  F/u HbA1C  Consult Dr. Stewart

## 2018-02-14 NOTE — H&P ADULT - NSHPSOCIALHISTORY_GEN_ALL_CORE
Lives at home, former smoker smoking started at age od 18. quit 5 6 yr ago. Alcohol or illicit drug use

## 2018-02-14 NOTE — H&P ADULT - PROBLEM SELECTOR PLAN 4
RISK                                                          Points  [  ] Previous VTE                                                3  [  ] Thrombophilia                                             2  [  ] Lower limb paralysis                                   2        (unable to hold up >15 seconds)    [  ] Current Cancer                                             2         (within 6 months)  [ x ] Immobilization > 24 hrs                              1  [  ] ICU/CCU stay > 24 hours                             1  [ x ] Age > 60                                                         1    IMPROVE VTE Score: 2  lovenox for VTE prophylaxis.

## 2018-02-14 NOTE — CONSULT NOTE ADULT - SUBJECTIVE AND OBJECTIVE BOX
Patient is a 52y old  Female who presents with a chief complaint of B/l foot ulcer/ Left ankle swelling (2018 04:36)      HPI:  52 yr old F from home, lives with son, ambulates independently with former smoker, PMH of IDDM, HTN, HLD, peripheral neuropathy, chronic back and leg pain came with complain b/l foot ulcer with erythema of foot with left ankle swelling since Saturday. She states that she developed two open ulcer b/l on 4th toes for which she was doing dressing with silver sulfazine. she progressively develop erythema, warmth and swelling of her left ankle and 4th toes b/l then she decided to come hospital. She also had fever of 100F on Saturday She denies cough, dyspnea, chest pain, abdominal pain, foot pain, N/V urinary or bowel complains, She has similar episodes of cellulitis of foot in past and was one time diagnosed with osteomyelitis was treated with long term antibiotic via PICC line. She has also been treated with Augmentin 1 month ago for similar type of cellulitis. found to have un cont dm. Pt states fsg higher lately.     In ED, Patient's vitals sign were stable,  Labs grossly normal. S/p one dose of vancomycin an zosyn. When patient seen by me she was sitting comfortably, Xray of foot shows no erosions (2018 04:36)      PAST MEDICAL & SURGICAL HISTORY:  HTN (hypertension)  Diabetes  S/P carpal tunnel release  S/P anal fissurectomy  S/P          MEDICATIONS  (STANDING):  amLODIPine   Tablet 5 milliGRAM(s) Oral daily  aspirin  chewable 81 milliGRAM(s) Oral daily  enoxaparin Injectable 40 milliGRAM(s) SubCutaneous daily  insulin glargine Injectable (LANTUS) 18 Unit(s) SubCutaneous at bedtime  insulin lispro (HumaLOG) corrective regimen sliding scale   SubCutaneous Before meals and at bedtime  insulin lispro Injectable (HumaLOG) 15 Unit(s) SubCutaneous three times a day before meals  piperacillin/tazobactam IVPB. 3.375 Gram(s) IV Intermittent once  piperacillin/tazobactam IVPB. 3.375 Gram(s) IV Intermittent every 8 hours  sertraline 100 milliGRAM(s) Oral daily  simvastatin 40 milliGRAM(s) Oral at bedtime    MEDICATIONS  (PRN):      FAMILY HISTORY:  Family history of lung cancer (Father)  Family history of diabetes mellitus      SOCIAL HISTORY:      REVIEW OF SYSTEMS:  CONSTITUTIONAL: No fever, weight loss, or fatigue  EYES: No eye pain, visual disturbances, or discharge  ENT:  No difficulty hearing, tinnitus, vertigo; No sinus or throat pain  NECK: No pain or stiffness  RESPIRATORY: No cough, wheezing, chills or hemoptysis; No Shortness of Breath  CARDIOVASCULAR: No chest pain, palpitations, passing out, dizziness, or leg swelling  GASTROINTESTINAL: No abdominal or epigastric pain. No nausea, vomiting, or hematemesis; No diarrhea or constipation. No melena or hematochezia.  GENITOURINARY: No dysuria, frequency, hematuria, or incontinence  NEUROLOGICAL: No headaches, memory loss, loss of strength, numbness, or tremors  SKIN: No itching, burning, rashes, or lesions   LYMPH Nodes: No enlarged glands  ENDOCRINE: No heat or cold intolerance; No hair loss  MUSCULOSKELETAL: No joint pain or swelling; No muscle, back, or extremity pain  PSYCHIATRIC: No depression, anxiety, mood swings, or difficulty sleeping  HEME/LYMPH: No easy bruising, or bleeding gums  ALLERGY AND IMMUNOLOGIC: No hives or eczema	        Vital Signs Last 24 Hrs  T(C): 36.8 (2018 08:16), Max: 36.8 (2018 08:16)  T(F): 98.2 (2018 08:16), Max: 98.2 (2018 08:16)  HR: 68 (2018 08:16) (68 - 80)  BP: 104/51 (2018 08:16) (104/51 - 134/77)  BP(mean): --  RR: 16 (2018 08:16) (16 - 16)  SpO2: 97% (2018 08:16) (96% - 97%)      Constitutional:    HEENT: nad    Neck:  No JVD, bruits or thyromegaly    Respiratory:  Clear without rales or rhonchi    Cardiovascular:  RR without murmur, rub or gallop.    Gastrointestinal: Soft without hepatosplenomegaly.    Extremities: without cyanosis, clubbing or edema. + erethema    Neurological:  Oriented   x   3   . No gross sensory or motor defects.        LABS:                        13.8   10.4  )-----------( 269      ( 2018 01:52 )             45.2     02-14    138  |  102  |  16  ----------------------------<  338<H>  3.7   |  28  |  0.92    Ca    9.1      2018 01:52    TPro  7.9  /  Alb  3.5  /  TBili  1.0  /  DBili  x   /  AST  17  /  ALT  24  /  AlkPhos  130<H>  02-14          Urinalysis Basic - ( 2018 02:27 )    Color: Yellow / Appearance: Slightly Turbid / S.015 / pH: x  Gluc: x / Ketone: Negative  / Bili: Negative / Urobili: Negative   Blood: x / Protein: 15 / Nitrite: Negative   Leuk Esterase: Moderate / RBC: 5-10 /HPF / WBC 11-25 /HPF   Sq Epi: x / Non Sq Epi: Moderate /HPF / Bacteria: Few /HPF      CAPILLARY BLOOD GLUCOSE      POCT Blood Glucose.: 354 mg/dL (2018 22:11)      RADIOLOGY & ADDITIONAL STUDIES:
S : 52y year old Female seen bedside for Right 4th digit ulceration.  Patient relates to having the ulceration for a few months ago. Patient has been seeing a podiatrist for years that has been treating her ulcers. Patients previous ulcers have been on the left foot which were present in  and since then have closed. Patient has been applying SSD on the 4th digit right foot as per Podiatrist, she states the edema on the 4th digit has been increasing and erythema increased. Patient states she feels no pain. 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      Chief Complaint : Patient is a 52y old  Female who presents with a chief complaint of B/l foot ulcer/ Left ankle swelling (2018 04:36)    HPI : HPI:  52 yr old F from home, lives with son, ambulates independently with former smoker, PMH of IDDM, HTN, HLD, peripheral neuropathy, chronic back and leg pain came with complain b/l foot ulcer with erythema of foot with left ankle swelling since Saturday. She states that she developed two open ulcer b/l on 4th toes for which she was doing dressing with silver sulfazine. she progressively develop erythema, warmth and swelling of her left ankle and 4th toes b/l then she decided to come hospital. She also had fever of 100F on Saturday She denies cough, dyspnea, chest pain, abdominal pain, foot pain, N/V urinary or bowel complains, She has similar episodes of cellulitis of foot in past and was one time diagnosed with osteomyelitis was treated with long term antibiotic via PICC line. She has also been treated with Augmentin 1 month ago for similar type of cellulitis.     In ED, Patient's vitals sign were stable,  Labs grossly normal. S/p one dose of vancomycin an zosyn. When patient seen by me she was sitting comfortably, Xray of foot shows no erosions (2018 04:36)      Patient admits to  (-) Fevers, (-) Chills, (-) Nausea, (-) Vomiting, (-) Shortness of Breath      PMH: HTN (hypertension)  Diabetes    PSH:S/P carpal tunnel release  S/P anal fissurectomy  S/P       Allergies:iodinated radiocontrast agents (Hives)      Labs:                          13.4   9.7   )-----------( 229      ( 2018 10:48 )             42.8     WBC Trend  9.7 Date ( @ 10:48)  10.4 Date ( @ 01:52)      Chem      139  |  104  |  13  ----------------------------<  132<H>  3.5   |  31  |  x     Ca    8.9      2018 10:48  Mg     1.7         TPro  7.9  /  Alb  3.5  /  TBili  1.0  /  DBili  x   /  AST  17  /  ALT  24  /  AlkPhos  130<H>            T(F): 98.2 (18 @ 08:16), Max: 98.2 (18 @ 08:16)  HR: 68 (18 @ 08:16) (68 - 80)  BP: 104/51 (18 @ 08:16) (104/51 - 134/77)  RR: 16 (18 @ 08:16) (16 - 16)  SpO2: 97% (18 @ 08:16) (96% - 97%)  Wt(kg): --    O:   General: Pleasant  female NAD & AOX3.    Integument:  Skin warm, dry and supple bilateral.    Ulceration Right distal digt: fibrogranular in nature, - hyperkeratotic border, wound base Fibrogranular,  + edema, + joe-wound erythema, + purulence, - fluctuance, - tracking/tunneling, - probe to bone.   Vascular: Dorsalis Pedis and Posterior Tibial pulses 2/4.  Capillary re-fill time less then 3 seconds digits 1-5 bilateral.    Neuro: Protective sensation diminished to the level of the digits bilateral.  MSK: Muscle strength 5/5 all major muscle groups bilateral.    Deformity:  A: Right distal digit ulceration       P:   Chart reviewed and Patient evaluated  Discussed diagnosis and treatment with patient  Wound flush with normal saline  Applied dry sterile dressing  X-rays reviewed  Continue with IV antibiotics As Per ID  Ordered SANDHYA  Recommend MRI   WBAT bilaterally   Offloading to bilateral Heels.   Discussed importance of daily foot examinations and proper shoe gear and to importance of lower Fasting Blood Glucose levels.   Podiatry will follow while in house.  Discussed with Ifeanyi
CHIEF COMPLAINT:Patient is a 52y old  Female who presents with a chief complaint of B/l foot ulcer/ Left ankle swelling (2018 04:36)      HPI:  52 yr old F from home, lives with son, ambulates independently with former smoker, PMH of IDDM, HTN, HLD, peripheral neuropathy, chronic back and leg pain came with complain b/l foot ulcer with erythema of foot with left ankle swelling since Saturday. She states that she developed two open ulcer b/l on 4th toes for which she was doing dressing with silver sulfazine. she progressively develop erythema, warmth and swelling of her left ankle and 4th toes b/l then she decided to come hospital. She also had fever of 100F on Saturday She denies cough, dyspnea, chest pain, abdominal pain, foot pain, N/V urinary or bowel complains, She has similar episodes of cellulitis of foot in past and was one time diagnosed with osteomyelitis was treated with long term antibiotic via PICC line. She has also been treated with Augmentin 1 month ago for similar type of cellulitis.     In ED, Patient's vitals sign were stable,  Labs grossly normal. S/p one dose of vancomycin an zosyn. When patient seen by me she was sitting comfortably, Xray of foot shows no erosions (2018 04:36)      PAST MEDICAL & SURGICAL HISTORY:  HTN (hypertension)  Diabetes  S/P carpal tunnel release  S/P anal fissurectomy  S/P       MEDICATIONS  (STANDING):  amLODIPine   Tablet 5 milliGRAM(s) Oral daily  aspirin  chewable 81 milliGRAM(s) Oral daily  enoxaparin Injectable 40 milliGRAM(s) SubCutaneous daily  fluconAZOLE   Tablet 200 milliGRAM(s) Oral daily  insulin glargine Injectable (LANTUS) 18 Unit(s) SubCutaneous at bedtime  insulin lispro (HumaLOG) corrective regimen sliding scale   SubCutaneous Before meals and at bedtime  insulin lispro Injectable (HumaLOG) 15 Unit(s) SubCutaneous three times a day before meals  piperacillin/tazobactam IVPB. 3.375 Gram(s) IV Intermittent once  piperacillin/tazobactam IVPB. 3.375 Gram(s) IV Intermittent every 8 hours  sertraline 100 milliGRAM(s) Oral daily  simvastatin 40 milliGRAM(s) Oral at bedtime    MEDICATIONS  (PRN):      FAMILY HISTORY:  Family history of lung cancer (Father)  Family history of diabetes mellitus  Mother:CHF    SOCIAL HISTORY:    [ x] Non-smoker    [ ] Alcohol-denies    Allergies    iodinated radiocontrast agents (Hives)    Intolerances    	    REVIEW OF SYSTEMS:  CONSTITUTIONAL: No fever, weight loss, or fatigue  EYES: No eye pain, visual disturbances, or discharge  ENT:  No difficulty hearing, tinnitus, vertigo; No sinus or throat pain  NECK: No pain or stiffness  RESPIRATORY: No cough, wheezing, chills or hemoptysis; No Shortness of Breath  CARDIOVASCULAR: No chest pain, palpitations, passing out, dizziness, + leg swelling  GASTROINTESTINAL: No abdominal or epigastric pain. No nausea, vomiting, or hematemesis; No diarrhea or constipation. No melena or hematochezia.  GENITOURINARY: No dysuria, frequency, hematuria, or incontinence  NEUROLOGICAL: No headaches, memory loss, loss of strength, numbness, or tremors  SKIN: No itching, burning, rashes, or lesions   LYMPH Nodes: No enlarged glands  ENDOCRINE: No heat or cold intolerance; No hair loss  MUSCULOSKELETAL: No joint pain or swelling; No muscle, back, or extremity pain  PSYCHIATRIC: No depression, anxiety, mood swings, or difficulty sleeping  HEME/LYMPH: No easy bruising, or bleeding gums  ALLERGY AND IMMUNOLOGIC: No hives or eczema	      PHYSICAL EXAM:  T(C): 36.8 (18 @ 12:25), Max: 36.8 (18 @ 08:16)  HR: 63 (18 @ 12:25) (63 - 80)  BP: 113/60 (18 @ 12:25) (104/51 - 134/77)  RR: 16 (18 @ 12:25) (16 - 16)  SpO2: 98% (18 @ 12:25) (96% - 98%)      Appearance: Normal	  HEENT:   Normal oral mucosa, PERRL, EOMI	  Lymphatic: No lymphadenopathy  Cardiovascular: Normal S1 S2, No JVD, No murmurs, No edema  Respiratory: Lungs clear to auscultation	  Psychiatry: A & O x 3, Mood & affect appropriate  Gastrointestinal:  Soft, Non-tender, + BS	  Skin: No rashes, No ecchymoses, No cyanosis	  Neurologic: Non-focal  Extremities: Normal range of motion, No clubbing, cyanosis +1 edema          ECG:  Normal sinus rhythm  Cannot rule out Anterior infarct	  RADIOLOGY:  OTHER: 	  	  LABS:	 	    CARDIAC MARKERS:                              13.4   9.7   )-----------( 229      ( 2018 10:48 )             42.8         139  |  104  |  13  ----------------------------<  132<H>  3.5   |  31  |  0.69    Ca    8.9      2018 10:48  Mg     1.7         TPro  7.9  /  Alb  3.5  /  TBili  1.0  /  DBili  x   /  AST  17  /  ALT  24  /  AlkPhos  130<H>      Lipid Profile: Cholesterol 134  LDL 60  HDL 35        TSH: Thyroid Stimulating Hormone, Serum: 1.42 uU/mL ( @ 10:48)        EXAM:  XR CHEST AP OR PA 1V                            PROCEDURE DATE:  2018          INTERPRETATION:  PA chest x-ray    Indication: Wound check at the feet.    PA chest x-ray is compared to a previous examination dated 2017.    Impression: No evidence for pulmonary consolidation, pleural effusion or   pneumothorax.    The trachea is midline.    The cardiac silhouette is within normal limits and unchanged.
HPI:  52 yr old F from home, lives with son, ambulates independently with former smoker, PMH of IDDM, HTN, HLD, peripheral neuropathy, chronic back and leg pain came with complain b/l foot ulcer with erythema of foot with left ankle swelling since Saturday. She states that she developed two open ulcer b/l on 4th toes for which she was doing dressing with silver sulfazine. she progressively develop erythema, warmth and swelling of her left ankle and 4th toes b/l then she decided to come hospital. She also had fever of 100F on Saturday She denies cough, dyspnea, chest pain, abdominal pain, foot pain, N/V urinary or bowel complains, She has similar episodes of cellulitis of foot in past and was one time diagnosed with osteomyelitis was treated with long term antibiotic via PICC line. She has also been treated with Augmentin 1 month ago for similar type of cellulitis.     In ED, Patient's vitals sign were stable,  Labs grossly normal. S/p one dose of vancomycin an zosyn. When patient seen by me she was sitting comfortably, Xray of foot shows no erosions (2018 04:36)      PAST MEDICAL & SURGICAL HISTORY:  HTN (hypertension)  Diabetes  S/P carpal tunnel release  S/P anal fissurectomy  S/P       iodinated radiocontrast agents (Hives)      aspirin  chewable 81 milliGRAM(s) Oral daily  enoxaparin Injectable 40 milliGRAM(s) SubCutaneous daily  fluconAZOLE   Tablet 200 milliGRAM(s) Oral daily  gemfibrozil 600 milliGRAM(s) Oral two times a day before meals  insulin glargine Injectable (LANTUS) 18 Unit(s) SubCutaneous at bedtime  insulin lispro (HumaLOG) corrective regimen sliding scale   SubCutaneous Before meals and at bedtime  insulin lispro Injectable (HumaLOG) 15 Unit(s) SubCutaneous three times a day before meals  lisinopril 5 milliGRAM(s) Oral daily  piperacillin/tazobactam IVPB. 3.375 Gram(s) IV Intermittent every 8 hours  piperacillin/tazobactam IVPB. 3.375 Gram(s) IV Intermittent once  sertraline 100 milliGRAM(s) Oral daily  simvastatin 10 milliGRAM(s) Oral at bedtime      Social Hx:    FAMILY HISTORY:  Family history of lung cancer (Father)  Family history of diabetes mellitus        ROS  [  ] UNABLE TO ELICIT    General:  [  ] None  [  ] Fever  [  ] Chills  [ x ] Malaise    Skin:  [  ] None [  ] Rash  [  ] Wound  [ x ] Ulcer    HEENT:  [ x ] None  [  ] Sore Throat  [  ] Nasal congestion/ runny nose  [  ] Photophobia [  ] Neck pain      Chest:  [ x ] None   [  ] SOB  [  ] Cough  [  ] None    Cardiovascular:   [ x ] None  [  ] CP  [  ] Palpitation    Gastrointestinal:  [ x ] None  [  ] Abd pain   [  ] Nausea    [  ] Vomiting   [  ] Diarrhea	     Genitourinary:  [ x ] None [  ] Polyuria   [  ] Urgency  [  ] Frequency  [  ] Dysuria    [  ]  Hematuria       Musculoskeletal:  [  ] None [  ] Back Pain	[  ] Body aches  [ x ] Right 4th toe wound and right foot redness and swelling.    Neurological: [  ] None [  ]Dizziness  [  ]Visual Disturbance  [  ]Headaches   [ x ] Weakness      PHYSICAL EXAM:    Vital Signs Last 24 Hrs  T(C): 36.8 (2018 12:25), Max: 36.8 (2018 08:16)  T(F): 98.2 (2018 12:25), Max: 98.2 (2018 08:16)  HR: 63 (2018 12:25) (63 - 80)  BP: 113/60 (2018 12:25) (104/51 - 134/77)  BP(mean): --  RR: 16 (2018 12:25) (16 - 16)  SpO2: 98% (2018 12:25) (96% - 98%)    Constitutional:    HEENT: [ x ] Wnl  [  ] Pharyngeal congestion    Neck:  [ x ] Supple  [  ]Lymphadenopathy  [  ] No JVD   [  ] JVD  [  ] Masses   [  ] WNL    CHEST/Respiratory:  [ x ]Clear to auscultation  [  ] Rales   [  ] Rhonchi   [  ] Wheezing     [  ] Chest Tenderness      Cardiovascular:  [ x ] Reg S1 S2   [  ] Irreg S1 S2   [ x ]No Murmur  [  ] +ve Murmurs  [  ]Systolic [  ]Diastolic      Abdomen:  [ x ] Soft  [ x ] No tendrerness  [  ] Tenderness  [  ] Organomegaly  [  ] ABD Distention  [  ] Rigidity                       [ x ] No Regidity                       [ x ] No Rebound Tenderness  [  ] No Guarding Rigidity  [  ] Rebound Tenderness[  ] Guarding Rigidity                          [ x ]  +ve Bowel Sounds  [  ] Decreased Bowel Sounds    [  ] Absent Bowel Sounds                            Extremities: [  ] No edema [ x ] Edema right foot [  ] Clubbing   [  ] Cyanosis                         [ x ] No Tender Calf muscles  [  ] Tender Calf muscles                        [ x ] Palpable peripheral pulses  [ x ] Right 4th toe wound and right foot redness and swelling, +VE tenia pedis with tissue infection. +VE dry scab Left 4th toe.    Neurological: [ x ] Awake  [ x ] Alert  [ x ] Oriented  x  3                           [  ] Confused  [  ] Drowzy  [  ] repond to painful stimuli  [  ] Unresponsive    Skin:  [  ] Intact [  ] Redness [  ] Thrombophlebitis  [  ] Rashes  [  ] Dry  [ x ] Ulcers/ wounds both 4th toes.    Ortho:  [  ] Joint Swelling  [  ] Joint erythema [  ] Joint tenderness                [  ] Increased temp. to touch  [ x ] DJD [  ] WNL      LABS/DIAGNOSTIC TESTS                          13.4   9.7   )-----------( 229      ( 2018 10:48 )             42.8     WBC Count: 9.7 K/uL ( @ 10:48)  WBC Count: 10.4 K/uL ( @ 01:52)          139  |  104  |  13  ----------------------------<  132<H>  3.5   |  31  |  0.69    Ca    8.9      2018 10:48  Mg     1.7         TPro  7.9  /  Alb  3.5  /  TBili  1.0  /  DBili  x   /  AST  17  /  ALT  24  /  AlkPhos  130<H>        Urinalysis Basic - ( 2018 02:27 )    Color: Yellow / Appearance: Slightly Turbid / S.015 / pH: x  Gluc: x / Ketone: Negative  / Bili: Negative / Urobili: Negative   Blood: x / Protein: 15 / Nitrite: Negative   Leuk Esterase: Moderate / RBC: 5-10 /HPF / WBC 11-25 /HPF   Sq Epi: x / Non Sq Epi: Moderate /HPF / Bacteria: Few /HPF        LIVER FUNCTIONS - ( 2018 01:52 )  Alb: 3.5 g/dL / Pro: 7.9 g/dL / ALK PHOS: 130 U/L / ALT: 24 U/L DA / AST: 17 U/L / GGT: x                 LACTATE:Lactate, Blood: 2.0 mmol/L ( @ 01:52)        CULTURES:   pending.    RADIOLOGY    CXR:    EXAM:  XR CHEST AP OR PA 1V                            PROCEDURE DATE:  2018          INTERPRETATION:  PA chest x-ray    Indication: Wound check at the feet.    PA chest x-ray is compared to a previous examination dated 2017.    Impression: No evidence for pulmonary consolidation, pleural effusion or   pneumothorax.    The trachea is midline.    The cardiac silhouette is within normal limits and unchanged.

## 2018-02-14 NOTE — H&P ADULT - HISTORY OF PRESENT ILLNESS
52 yr old F from home, lives with son, ambulates independently with PMH of IDDM, HTN, HLD, peripheral neuropathy, chronic back and leg pain came with complain b/l foot ulcer with erythema of foot with left ankle swelling since Saturday. She states that she developed two open ulcer b/l on 4th toes for which she was doing dressing with silver sulfazine. she progressively develop erythema, warmth and swelling of her left ankle then she decided to come hospital. She also had fever of 100F on Saturday She denies cough, dyspnea, chest pain, abdominal pain, foot pain, N/V urinary or bowel complains, She has similar episodes of cellulitis of foot in past and was one time diagnosed with osteomyelitis was treated with long term antibiotic via PICC line. She has also been treated with Augmentin 1 month ago for similar type of cellulitis.     In 52 yr old F from home, lives with son, ambulates independently with PMH of IDDM, HTN, HLD, peripheral neuropathy, chronic back and leg pain came with complain b/l foot ulcer with erythema of foot with left ankle swelling since Saturday. She states that she developed two open ulcer b/l on 4th toes for which she was doing dressing with silver sulfazine. she progressively develop erythema, warmth and swelling of her left ankle then she decided to come hospital. She also had fever of 100F on Saturday She denies cough, dyspnea, chest pain, abdominal pain, foot pain, N/V urinary or bowel complains, She has similar episodes of cellulitis of foot in past and was one time diagnosed with osteomyelitis was treated with long term antibiotic via PICC line. She has also been treated with Augmentin 1 month ago for similar type of cellulitis.     In ED, 52 yr old F from home, lives with son, ambulates independently with PMH of IDDM, HTN, HLD, peripheral neuropathy, chronic back and leg pain came with complain b/l foot ulcer with erythema of foot with left ankle swelling since Saturday. She states that she developed two open ulcer b/l on 4th toes for which she was doing dressing with silver sulfazine. she progressively develop erythema, warmth and swelling of her left ankle then she decided to come hospital. She also had fever of 100F on Saturday She denies cough, dyspnea, chest pain, abdominal pain, foot pain, N/V urinary or bowel complains, She has similar episodes of cellulitis of foot in past and was one time diagnosed with osteomyelitis was treated with long term antibiotic via PICC line. She has also been treated with Augmentin 1 month ago for similar type of cellulitis.     In ED, Patient's vitals sign were stable, 52 yr old F from home, lives with son, ambulates independently with former smoker, PMH of IDDM, HTN, HLD, peripheral neuropathy, chronic back and leg pain came with complain b/l foot ulcer with erythema of foot with left ankle swelling since Saturday. She states that she developed two open ulcer b/l on 4th toes for which she was doing dressing with silver sulfazine. she progressively develop erythema, warmth and swelling of her left ankle then she decided to come hospital. She also had fever of 100F on Saturday She denies cough, dyspnea, chest pain, abdominal pain, foot pain, N/V urinary or bowel complains, She has similar episodes of cellulitis of foot in past and was one time diagnosed with osteomyelitis was treated with long term antibiotic via PICC line. She has also been treated with Augmentin 1 month ago for similar type of cellulitis.     In ED, Patient's vitals sign were stable,  Labs grossly normal. S/p one dose of vancomycin an zosyn. When patient seen by me she was sitting comfortably 52 yr old F from home, lives with son, ambulates independently with former smoker, PMH of IDDM, HTN, HLD, peripheral neuropathy, chronic back and leg pain came with complain b/l foot ulcer with erythema of foot with left ankle swelling since Saturday. She states that she developed two open ulcer b/l on 4th toes for which she was doing dressing with silver sulfazine. she progressively develop erythema, warmth and swelling of her left ankle then she decided to come hospital. She also had fever of 100F on Saturday She denies cough, dyspnea, chest pain, abdominal pain, foot pain, N/V urinary or bowel complains, She has similar episodes of cellulitis of foot in past and was one time diagnosed with osteomyelitis was treated with long term antibiotic via PICC line. She has also been treated with Augmentin 1 month ago for similar type of cellulitis.     In ED, Patient's vitals sign were stable,  Labs grossly normal. S/p one dose of vancomycin an zosyn. When patient seen by me she was sitting comfortably, 52 yr old F from home, lives with son, ambulates independently with former smoker, PMH of IDDM, HTN, HLD, peripheral neuropathy, chronic back and leg pain came with complain b/l foot ulcer with erythema of foot with left ankle swelling since Saturday. She states that she developed two open ulcer b/l on 4th toes for which she was doing dressing with silver sulfazine. she progressively develop erythema, warmth and swelling of her left ankle and 4th toes b/l then she decided to come hospital. She also had fever of 100F on Saturday She denies cough, dyspnea, chest pain, abdominal pain, foot pain, N/V urinary or bowel complains, She has similar episodes of cellulitis of foot in past and was one time diagnosed with osteomyelitis was treated with long term antibiotic via PICC line. She has also been treated with Augmentin 1 month ago for similar type of cellulitis.     In ED, Patient's vitals sign were stable,  Labs grossly normal. S/p one dose of vancomycin an zosyn. When patient seen by me she was sitting comfortably, 52 yr old F from home, lives with son, ambulates independently with former smoker, PMH of IDDM, HTN, HLD, peripheral neuropathy, chronic back and leg pain came with complain b/l foot ulcer with erythema of foot with left ankle swelling since Saturday. She states that she developed two open ulcer b/l on 4th toes for which she was doing dressing with silver sulfazine. she progressively develop erythema, warmth and swelling of her left ankle and 4th toes b/l then she decided to come hospital. She also had fever of 100F on Saturday She denies cough, dyspnea, chest pain, abdominal pain, foot pain, N/V urinary or bowel complains, She has similar episodes of cellulitis of foot in past and was one time diagnosed with osteomyelitis was treated with long term antibiotic via PICC line. She has also been treated with Augmentin 1 month ago for similar type of cellulitis.     In ED, Patient's vitals sign were stable,  Labs grossly normal. S/p one dose of vancomycin an zosyn. When patient seen by me she was sitting comfortably, Xray of foot shows no erosions

## 2018-02-14 NOTE — PATIENT PROFILE ADULT. - NS TRANSFER PATIENT BELONGINGS
Other belongings/Jewelry/Money (specify)/clothes,  ring/Cell Phone/PDA (specify) clothes,  ring, , necklace, earings/Cell Phone/PDA (specify)/Jewelry/Money (specify)/Other belongings

## 2018-02-14 NOTE — CONSULT NOTE ADULT - PROBLEM SELECTOR RECOMMENDATION 9
needs better control  change lantus to 30 units qhs  cont humalog ac tid  fsg ac and hs  check A1c
1- UA & CS.  2- Follow Blood culture to final report.  3- Diflucan 200 mg po q day.  4- Continue IV Zosyn.  5- Fluid and electrolytes management.  6- CBC and BMP follow up.   7- MRI bth feet to rule out osteomyelitis.

## 2018-02-14 NOTE — CONSULT NOTE ADULT - ASSESSMENT
52 yr old F from home, lives with son, ambulates independently with former smoker, PMH of IDDM, HTN, HLD, peripheral neuropathy, chronic back and leg pain came with complain b/l foot ulcer with erythema of foot with left ankle swelling. Admitted with cellulitis found to have un cont dm. Pt states fsg higher lately.
52 yr old F from home, lives with son, ambulates independently with former smoker, PMH of IDDM, HTN, HLD, peripheral neuropathy, chronic back and leg pain came with complain b/l foot ulcer with erythema of foot with left ankle swelling  for one day.  Patient was admitted for cellulitis of the right foot and was started on IV Zosyn.
52 yr old F from home, lives with son, ambulates independently with former smoker, PMH of IDDM, HTN, HLD, peripheral neuropathy, chronic back and leg pain came with complain b/l foot ulcer with erythema of foot with left ankle swelling since Saturday.  1.D/C norvasc.  2.Add Lisinopril 5mg qd.  3.DM-as per Endocrine.  4.ABX as per ID.  5.LIpid d/o-statin and lopid.  6.Podiatry f/u.  7.Cont asa and statin.  8.Echocardiogram.  9.GI and DVT prophylaxis.

## 2018-02-15 DIAGNOSIS — E11.69 TYPE 2 DIABETES MELLITUS WITH OTHER SPECIFIED COMPLICATION: ICD-10-CM

## 2018-02-15 DIAGNOSIS — Z02.9 ENCOUNTER FOR ADMINISTRATIVE EXAMINATIONS, UNSPECIFIED: ICD-10-CM

## 2018-02-15 LAB
ANION GAP SERPL CALC-SCNC: 5 MMOL/L — SIGNIFICANT CHANGE UP (ref 5–17)
APPEARANCE UR: CLEAR — SIGNIFICANT CHANGE UP
BACTERIA # UR AUTO: ABNORMAL /HPF
BASOPHILS # BLD AUTO: 0.1 K/UL — SIGNIFICANT CHANGE UP (ref 0–0.2)
BASOPHILS NFR BLD AUTO: 0.6 % — SIGNIFICANT CHANGE UP (ref 0–2)
BILIRUB UR-MCNC: NEGATIVE — SIGNIFICANT CHANGE UP
BUN SERPL-MCNC: 12 MG/DL — SIGNIFICANT CHANGE UP (ref 7–18)
CALCIUM SERPL-MCNC: 9 MG/DL — SIGNIFICANT CHANGE UP (ref 8.4–10.5)
CHLORIDE SERPL-SCNC: 104 MMOL/L — SIGNIFICANT CHANGE UP (ref 96–108)
CO2 SERPL-SCNC: 30 MMOL/L — SIGNIFICANT CHANGE UP (ref 22–31)
COLOR SPEC: YELLOW — SIGNIFICANT CHANGE UP
COMMENT - URINE: SIGNIFICANT CHANGE UP
CREAT SERPL-MCNC: 0.76 MG/DL — SIGNIFICANT CHANGE UP (ref 0.5–1.3)
DIFF PNL FLD: ABNORMAL
EOSINOPHIL # BLD AUTO: 0.8 K/UL — HIGH (ref 0–0.5)
EOSINOPHIL NFR BLD AUTO: 9 % — HIGH (ref 0–6)
EPI CELLS # UR: ABNORMAL /HPF
GLUCOSE BLDC GLUCOMTR-MCNC: 162 MG/DL — HIGH (ref 70–99)
GLUCOSE BLDC GLUCOMTR-MCNC: 191 MG/DL — HIGH (ref 70–99)
GLUCOSE BLDC GLUCOMTR-MCNC: 239 MG/DL — HIGH (ref 70–99)
GLUCOSE BLDC GLUCOMTR-MCNC: 305 MG/DL — HIGH (ref 70–99)
GLUCOSE SERPL-MCNC: 238 MG/DL — HIGH (ref 70–99)
GLUCOSE UR QL: 100 MG/DL
HBA1C BLD-MCNC: 11.3 % — HIGH (ref 4–5.6)
HCT VFR BLD CALC: 40.6 % — SIGNIFICANT CHANGE UP (ref 34.5–45)
HGB BLD-MCNC: 13.2 G/DL — SIGNIFICANT CHANGE UP (ref 11.5–15.5)
KETONES UR-MCNC: NEGATIVE — SIGNIFICANT CHANGE UP
LEUKOCYTE ESTERASE UR-ACNC: ABNORMAL
LYMPHOCYTES # BLD AUTO: 2.6 K/UL — SIGNIFICANT CHANGE UP (ref 1–3.3)
LYMPHOCYTES # BLD AUTO: 30.8 % — SIGNIFICANT CHANGE UP (ref 13–44)
MAGNESIUM SERPL-MCNC: 1.8 MG/DL — SIGNIFICANT CHANGE UP (ref 1.6–2.6)
MCHC RBC-ENTMCNC: 30 PG — SIGNIFICANT CHANGE UP (ref 27–34)
MCHC RBC-ENTMCNC: 32.6 GM/DL — SIGNIFICANT CHANGE UP (ref 32–36)
MCV RBC AUTO: 92 FL — SIGNIFICANT CHANGE UP (ref 80–100)
MONOCYTES # BLD AUTO: 0.8 K/UL — SIGNIFICANT CHANGE UP (ref 0–0.9)
MONOCYTES NFR BLD AUTO: 9.2 % — SIGNIFICANT CHANGE UP (ref 2–14)
NEUTROPHILS # BLD AUTO: 4.3 K/UL — SIGNIFICANT CHANGE UP (ref 1.8–7.4)
NEUTROPHILS NFR BLD AUTO: 50.4 % — SIGNIFICANT CHANGE UP (ref 43–77)
NITRITE UR-MCNC: NEGATIVE — SIGNIFICANT CHANGE UP
PH UR: 6 — SIGNIFICANT CHANGE UP (ref 5–8)
PHOSPHATE SERPL-MCNC: 3.3 MG/DL — SIGNIFICANT CHANGE UP (ref 2.5–4.5)
PLATELET # BLD AUTO: 218 K/UL — SIGNIFICANT CHANGE UP (ref 150–400)
POTASSIUM SERPL-MCNC: 4.3 MMOL/L — SIGNIFICANT CHANGE UP (ref 3.5–5.3)
POTASSIUM SERPL-SCNC: 4.3 MMOL/L — SIGNIFICANT CHANGE UP (ref 3.5–5.3)
PROT UR-MCNC: 15
RBC # BLD: 4.42 M/UL — SIGNIFICANT CHANGE UP (ref 3.8–5.2)
RBC # FLD: 12.6 % — SIGNIFICANT CHANGE UP (ref 10.3–14.5)
RBC CASTS # UR COMP ASSIST: SIGNIFICANT CHANGE UP /HPF (ref 0–2)
SODIUM SERPL-SCNC: 139 MMOL/L — SIGNIFICANT CHANGE UP (ref 135–145)
SP GR SPEC: 1.01 — SIGNIFICANT CHANGE UP (ref 1.01–1.02)
UROBILINOGEN FLD QL: NEGATIVE — SIGNIFICANT CHANGE UP
WBC # BLD: 8.6 K/UL — SIGNIFICANT CHANGE UP (ref 3.8–10.5)
WBC # FLD AUTO: 8.6 K/UL — SIGNIFICANT CHANGE UP (ref 3.8–10.5)
WBC UR QL: ABNORMAL /HPF (ref 0–5)

## 2018-02-15 PROCEDURE — 73718 MRI LOWER EXTREMITY W/O DYE: CPT | Mod: 26,LT

## 2018-02-15 RX ORDER — VANCOMYCIN HCL 1 G
1000 VIAL (EA) INTRAVENOUS EVERY 12 HOURS
Qty: 0 | Refills: 0 | Status: DISCONTINUED | OUTPATIENT
Start: 2018-02-16 | End: 2018-02-19

## 2018-02-15 RX ORDER — VANCOMYCIN HCL 1 G
VIAL (EA) INTRAVENOUS
Qty: 0 | Refills: 0 | Status: DISCONTINUED | OUTPATIENT
Start: 2018-02-15 | End: 2018-02-19

## 2018-02-15 RX ORDER — VANCOMYCIN HCL 1 G
1000 VIAL (EA) INTRAVENOUS ONCE
Qty: 0 | Refills: 0 | Status: COMPLETED | OUTPATIENT
Start: 2018-02-15 | End: 2018-02-15

## 2018-02-15 RX ORDER — INSULIN GLARGINE 100 [IU]/ML
40 INJECTION, SOLUTION SUBCUTANEOUS AT BEDTIME
Qty: 0 | Refills: 0 | Status: DISCONTINUED | OUTPATIENT
Start: 2018-02-15 | End: 2018-02-16

## 2018-02-15 RX ORDER — COLLAGENASE CLOSTRIDIUM HIST. 250 UNIT/G
1 OINTMENT (GRAM) TOPICAL ONCE
Qty: 0 | Refills: 0 | Status: DISCONTINUED | OUTPATIENT
Start: 2018-02-15 | End: 2018-02-21

## 2018-02-15 RX ADMIN — Medication 4: at 12:04

## 2018-02-15 RX ADMIN — Medication 15 UNIT(S): at 08:13

## 2018-02-15 RX ADMIN — Medication 15 UNIT(S): at 12:04

## 2018-02-15 RX ADMIN — LISINOPRIL 5 MILLIGRAM(S): 2.5 TABLET ORAL at 06:20

## 2018-02-15 RX ADMIN — PIPERACILLIN AND TAZOBACTAM 25 GRAM(S): 4; .5 INJECTION, POWDER, LYOPHILIZED, FOR SOLUTION INTRAVENOUS at 13:31

## 2018-02-15 RX ADMIN — Medication 250 MILLIGRAM(S): at 19:27

## 2018-02-15 RX ADMIN — Medication 1: at 17:24

## 2018-02-15 RX ADMIN — SIMVASTATIN 10 MILLIGRAM(S): 20 TABLET, FILM COATED ORAL at 22:00

## 2018-02-15 RX ADMIN — PIPERACILLIN AND TAZOBACTAM 25 GRAM(S): 4; .5 INJECTION, POWDER, LYOPHILIZED, FOR SOLUTION INTRAVENOUS at 22:01

## 2018-02-15 RX ADMIN — INSULIN GLARGINE 40 UNIT(S): 100 INJECTION, SOLUTION SUBCUTANEOUS at 22:26

## 2018-02-15 RX ADMIN — SERTRALINE 100 MILLIGRAM(S): 25 TABLET, FILM COATED ORAL at 13:31

## 2018-02-15 RX ADMIN — Medication 1: at 22:26

## 2018-02-15 RX ADMIN — FLUCONAZOLE 200 MILLIGRAM(S): 150 TABLET ORAL at 13:31

## 2018-02-15 RX ADMIN — Medication 600 MILLIGRAM(S): at 08:13

## 2018-02-15 RX ADMIN — Medication 600 MILLIGRAM(S): at 17:24

## 2018-02-15 RX ADMIN — ENOXAPARIN SODIUM 40 MILLIGRAM(S): 100 INJECTION SUBCUTANEOUS at 12:03

## 2018-02-15 RX ADMIN — Medication 15 UNIT(S): at 17:24

## 2018-02-15 RX ADMIN — PIPERACILLIN AND TAZOBACTAM 25 GRAM(S): 4; .5 INJECTION, POWDER, LYOPHILIZED, FOR SOLUTION INTRAVENOUS at 06:20

## 2018-02-15 RX ADMIN — Medication 81 MILLIGRAM(S): at 13:31

## 2018-02-15 RX ADMIN — Medication 2: at 08:13

## 2018-02-15 NOTE — PROGRESS NOTE ADULT - PROBLEM SELECTOR PLAN 1
bilateral MRI positive for osteo  ID Rabbat following -- continue Diflucan 200 mg po q day x 2 weeks. Continue IV Zosyn  Vancomycin 1 gm IVPB q 12 hours started per recs

## 2018-02-15 NOTE — PROGRESS NOTE ADULT - PROBLEM SELECTOR PLAN 1
bilateral MRI positive for osteo  ID Rabbat following -- continue Diflucan 200 mg po q day x 2 weeks. Continue IV Zosyn  Vancomycin 1 gm IVPB q 12 hours started per recs bilateral MRI positive for osteo  ID Rabbat following -- continue Diflucan 200 mg po q day x 2 weeks. Continue IV Zosyn  Vancomycin 1 gm IVPB q 12 hours started per efrem Pierre following for cardiac clearance -- Stress test-R/O ischemia. TTE ordered bilateral MRI positive for osteo  ID Rabbat following -- continue Diflucan 200 mg po q day x 2 weeks. Continue IV Zosyn  Vancomycin 1 gm IVPB q 12 hours started per recs  Bcx negative  Nabatian following for cardiac clearance -- Stress test-R/O ischemia. TTE ordered US Duplex Arterial Lower Ext Compl, Bilateral (02.14.18 @ 16:08) showed Slightly elevated ABIs which may be seen with calcified vessels.  bilateral MRI positive for osteo  ID Rabbat following -- continue Diflucan 200 mg po q day x 2 weeks. Continue IV Zosyn  Vancomycin 1 gm IVPB q 12 hours started per recs  Bcx negative  Nabatian following for cardiac clearance -- Stress test-R/O ischemia. TTE ordered

## 2018-02-15 NOTE — ADVANCED PRACTICE NURSE CONSULT - ASSESSMENT
This is a 52yr old female patient admitted for Diabetes, presenting with Bilateral Diabetic Foot Ulcers, to which the patient is being followed by Podiatry with a treatment plan in place to address the patients issues. There is currently no further need for wound care specialist consultation at this time.

## 2018-02-16 ENCOUNTER — TRANSCRIPTION ENCOUNTER (OUTPATIENT)
Age: 53
End: 2018-02-16

## 2018-02-16 DIAGNOSIS — B37.49 OTHER UROGENITAL CANDIDIASIS: ICD-10-CM

## 2018-02-16 DIAGNOSIS — I10 ESSENTIAL (PRIMARY) HYPERTENSION: ICD-10-CM

## 2018-02-16 DIAGNOSIS — M86.10 OTHER ACUTE OSTEOMYELITIS, UNSPECIFIED SITE: ICD-10-CM

## 2018-02-16 DIAGNOSIS — E13.43 OTHER SPECIFIED DIABETES MELLITUS WITH DIABETIC AUTONOMIC (POLY)NEUROPATHY: ICD-10-CM

## 2018-02-16 LAB
ALBUMIN SERPL ELPH-MCNC: 3.1 G/DL — LOW (ref 3.5–5)
ALP SERPL-CCNC: 88 U/L — SIGNIFICANT CHANGE UP (ref 40–120)
ALT FLD-CCNC: 21 U/L DA — SIGNIFICANT CHANGE UP (ref 10–60)
ANION GAP SERPL CALC-SCNC: 7 MMOL/L — SIGNIFICANT CHANGE UP (ref 5–17)
APTT BLD: 30.4 SEC — SIGNIFICANT CHANGE UP (ref 27.5–37.4)
AST SERPL-CCNC: 14 U/L — SIGNIFICANT CHANGE UP (ref 10–40)
BASOPHILS # BLD AUTO: 0.1 K/UL — SIGNIFICANT CHANGE UP (ref 0–0.2)
BASOPHILS NFR BLD AUTO: 1 % — SIGNIFICANT CHANGE UP (ref 0–2)
BILIRUB SERPL-MCNC: 1.1 MG/DL — SIGNIFICANT CHANGE UP (ref 0.2–1.2)
BUN SERPL-MCNC: 11 MG/DL — SIGNIFICANT CHANGE UP (ref 7–18)
CALCIUM SERPL-MCNC: 8.9 MG/DL — SIGNIFICANT CHANGE UP (ref 8.4–10.5)
CHLORIDE SERPL-SCNC: 104 MMOL/L — SIGNIFICANT CHANGE UP (ref 96–108)
CO2 SERPL-SCNC: 30 MMOL/L — SIGNIFICANT CHANGE UP (ref 22–31)
CREAT SERPL-MCNC: 0.79 MG/DL — SIGNIFICANT CHANGE UP (ref 0.5–1.3)
EOSINOPHIL # BLD AUTO: 0.9 K/UL — HIGH (ref 0–0.5)
EOSINOPHIL NFR BLD AUTO: 10.2 % — HIGH (ref 0–6)
GLUCOSE BLDC GLUCOMTR-MCNC: 150 MG/DL — HIGH (ref 70–99)
GLUCOSE BLDC GLUCOMTR-MCNC: 177 MG/DL — HIGH (ref 70–99)
GLUCOSE BLDC GLUCOMTR-MCNC: 241 MG/DL — HIGH (ref 70–99)
GLUCOSE BLDC GLUCOMTR-MCNC: 244 MG/DL — HIGH (ref 70–99)
GLUCOSE SERPL-MCNC: 229 MG/DL — HIGH (ref 70–99)
HCT VFR BLD CALC: 38.8 % — SIGNIFICANT CHANGE UP (ref 34.5–45)
HGB BLD-MCNC: 12.9 G/DL — SIGNIFICANT CHANGE UP (ref 11.5–15.5)
INR BLD: 1.03 RATIO — SIGNIFICANT CHANGE UP (ref 0.88–1.16)
LYMPHOCYTES # BLD AUTO: 2.9 K/UL — SIGNIFICANT CHANGE UP (ref 1–3.3)
LYMPHOCYTES # BLD AUTO: 34.6 % — SIGNIFICANT CHANGE UP (ref 13–44)
MAGNESIUM SERPL-MCNC: 1.9 MG/DL — SIGNIFICANT CHANGE UP (ref 1.6–2.6)
MCHC RBC-ENTMCNC: 30.4 PG — SIGNIFICANT CHANGE UP (ref 27–34)
MCHC RBC-ENTMCNC: 33.3 GM/DL — SIGNIFICANT CHANGE UP (ref 32–36)
MCV RBC AUTO: 91.2 FL — SIGNIFICANT CHANGE UP (ref 80–100)
MONOCYTES # BLD AUTO: 0.7 K/UL — SIGNIFICANT CHANGE UP (ref 0–0.9)
MONOCYTES NFR BLD AUTO: 8.3 % — SIGNIFICANT CHANGE UP (ref 2–14)
NEUTROPHILS # BLD AUTO: 3.9 K/UL — SIGNIFICANT CHANGE UP (ref 1.8–7.4)
NEUTROPHILS NFR BLD AUTO: 45.9 % — SIGNIFICANT CHANGE UP (ref 43–77)
PHOSPHATE SERPL-MCNC: 3.5 MG/DL — SIGNIFICANT CHANGE UP (ref 2.5–4.5)
PLATELET # BLD AUTO: 215 K/UL — SIGNIFICANT CHANGE UP (ref 150–400)
POTASSIUM SERPL-MCNC: 3.9 MMOL/L — SIGNIFICANT CHANGE UP (ref 3.5–5.3)
POTASSIUM SERPL-SCNC: 3.9 MMOL/L — SIGNIFICANT CHANGE UP (ref 3.5–5.3)
PROT SERPL-MCNC: 6.7 G/DL — SIGNIFICANT CHANGE UP (ref 6–8.3)
PROTHROM AB SERPL-ACNC: 11.2 SEC — SIGNIFICANT CHANGE UP (ref 9.8–12.7)
RBC # BLD: 4.25 M/UL — SIGNIFICANT CHANGE UP (ref 3.8–5.2)
RBC # FLD: 12.8 % — SIGNIFICANT CHANGE UP (ref 10.3–14.5)
SODIUM SERPL-SCNC: 141 MMOL/L — SIGNIFICANT CHANGE UP (ref 135–145)
WBC # BLD: 8.4 K/UL — SIGNIFICANT CHANGE UP (ref 3.8–10.5)
WBC # FLD AUTO: 8.4 K/UL — SIGNIFICANT CHANGE UP (ref 3.8–10.5)

## 2018-02-16 PROCEDURE — 77001 FLUOROGUIDE FOR VEIN DEVICE: CPT | Mod: 26

## 2018-02-16 PROCEDURE — 76937 US GUIDE VASCULAR ACCESS: CPT | Mod: 26

## 2018-02-16 PROCEDURE — 36569 INSJ PICC 5 YR+ W/O IMAGING: CPT

## 2018-02-16 RX ORDER — SODIUM CHLORIDE 9 MG/ML
10 INJECTION INTRAMUSCULAR; INTRAVENOUS; SUBCUTANEOUS EVERY 12 HOURS
Qty: 0 | Refills: 0 | Status: DISCONTINUED | OUTPATIENT
Start: 2018-02-16 | End: 2018-02-21

## 2018-02-16 RX ORDER — SODIUM CHLORIDE 9 MG/ML
20 INJECTION INTRAMUSCULAR; INTRAVENOUS; SUBCUTANEOUS ONCE
Qty: 0 | Refills: 0 | Status: DISCONTINUED | OUTPATIENT
Start: 2018-02-16 | End: 2018-02-21

## 2018-02-16 RX ORDER — SODIUM CHLORIDE 9 MG/ML
10 INJECTION INTRAMUSCULAR; INTRAVENOUS; SUBCUTANEOUS
Qty: 0 | Refills: 0 | Status: DISCONTINUED | OUTPATIENT
Start: 2018-02-16 | End: 2018-02-21

## 2018-02-16 RX ORDER — INSULIN GLARGINE 100 [IU]/ML
46 INJECTION, SOLUTION SUBCUTANEOUS AT BEDTIME
Qty: 0 | Refills: 0 | Status: DISCONTINUED | OUTPATIENT
Start: 2018-02-16 | End: 2018-02-21

## 2018-02-16 RX ORDER — ACETAMINOPHEN 500 MG
650 TABLET ORAL EVERY 6 HOURS
Qty: 0 | Refills: 0 | Status: DISCONTINUED | OUTPATIENT
Start: 2018-02-16 | End: 2018-02-21

## 2018-02-16 RX ORDER — OXYCODONE AND ACETAMINOPHEN 5; 325 MG/1; MG/1
1 TABLET ORAL ONCE
Qty: 0 | Refills: 0 | Status: DISCONTINUED | OUTPATIENT
Start: 2018-02-16 | End: 2018-02-16

## 2018-02-16 RX ADMIN — Medication 15 UNIT(S): at 12:06

## 2018-02-16 RX ADMIN — INSULIN GLARGINE 46 UNIT(S): 100 INJECTION, SOLUTION SUBCUTANEOUS at 21:12

## 2018-02-16 RX ADMIN — Medication 1: at 17:17

## 2018-02-16 RX ADMIN — Medication 250 MILLIGRAM(S): at 18:26

## 2018-02-16 RX ADMIN — FLUCONAZOLE 200 MILLIGRAM(S): 150 TABLET ORAL at 12:08

## 2018-02-16 RX ADMIN — LISINOPRIL 5 MILLIGRAM(S): 2.5 TABLET ORAL at 06:09

## 2018-02-16 RX ADMIN — Medication 250 MILLIGRAM(S): at 06:08

## 2018-02-16 RX ADMIN — Medication 15 UNIT(S): at 17:18

## 2018-02-16 RX ADMIN — Medication 81 MILLIGRAM(S): at 12:07

## 2018-02-16 RX ADMIN — Medication 600 MILLIGRAM(S): at 17:17

## 2018-02-16 RX ADMIN — PIPERACILLIN AND TAZOBACTAM 25 GRAM(S): 4; .5 INJECTION, POWDER, LYOPHILIZED, FOR SOLUTION INTRAVENOUS at 06:09

## 2018-02-16 RX ADMIN — Medication 600 MILLIGRAM(S): at 06:09

## 2018-02-16 RX ADMIN — SERTRALINE 100 MILLIGRAM(S): 25 TABLET, FILM COATED ORAL at 12:09

## 2018-02-16 RX ADMIN — Medication 2: at 12:04

## 2018-02-16 RX ADMIN — SIMVASTATIN 10 MILLIGRAM(S): 20 TABLET, FILM COATED ORAL at 21:12

## 2018-02-16 RX ADMIN — PIPERACILLIN AND TAZOBACTAM 25 GRAM(S): 4; .5 INJECTION, POWDER, LYOPHILIZED, FOR SOLUTION INTRAVENOUS at 21:12

## 2018-02-16 RX ADMIN — PIPERACILLIN AND TAZOBACTAM 25 GRAM(S): 4; .5 INJECTION, POWDER, LYOPHILIZED, FOR SOLUTION INTRAVENOUS at 15:16

## 2018-02-16 RX ADMIN — ENOXAPARIN SODIUM 40 MILLIGRAM(S): 100 INJECTION SUBCUTANEOUS at 12:07

## 2018-02-16 RX ADMIN — OXYCODONE AND ACETAMINOPHEN 1 TABLET(S): 5; 325 TABLET ORAL at 21:12

## 2018-02-16 RX ADMIN — Medication 650 MILLIGRAM(S): at 19:46

## 2018-02-16 RX ADMIN — OXYCODONE AND ACETAMINOPHEN 1 TABLET(S): 5; 325 TABLET ORAL at 22:09

## 2018-02-16 NOTE — DISCHARGE NOTE ADULT - MEDICATION SUMMARY - MEDICATIONS TO STOP TAKING
I will STOP taking the medications listed below when I get home from the hospital:  None I will STOP taking the medications listed below when I get home from the hospital:    metoprolol succinate 50 mg oral tablet, extended release  -- 1 tab(s) by mouth once a day    amLODIPine 5 mg oral tablet  -- 1 tab(s) by mouth once a day    silver sulfADIAZINE 1% topical cream  -- 1 application on skin once a day    cephalexin 500 mg oral tablet  -- 1 tab(s) by mouth 4 times a day for 11 days    Ceftin 500 mg oral tablet  -- 1 tab(s) by mouth 2 times a day  -- Finish all this medication unless otherwise directed by prescriber.  Medication should be taken with plenty of water.  Take with food or milk.    predniSONE 20 mg oral tablet  -- 3 tab(s) by mouth once a day with food  -- It is very important that you take or use this exactly as directed.  Do not skip doses or discontinue unless directed by your doctor.  Obtain medical advice before taking any non-prescription drugs as some may affect the action of this medication.  Take with food or milk.

## 2018-02-16 NOTE — PROGRESS NOTE ADULT - PROBLEM SELECTOR PLAN 1
Bilateral 4tth toe osteomyelitis, plan for six week long term iv antibiotics. Pending final results of blood cultures, NGTD. As per podiatry will benefit from flexor tenotomies because of flexible hammertoe deformity causing reulcerations distal digitally. Pt will follow up outpatient with podiatry

## 2018-02-16 NOTE — DISCHARGE NOTE ADULT - ADDITIONAL INSTRUCTIONS
dr. Christine in 1 week. dr. Christine in 1 week.  Patient is to follow up with Dr. Senior at [phone] [place] [address] on Tuesday/Wednesday of upcoming week for office procedure.

## 2018-02-16 NOTE — DISCHARGE NOTE ADULT - PATIENT PORTAL LINK FT
You can access the [facility] Patient Portal, offered by [facility], by registering with the following website: [email or web address]

## 2018-02-16 NOTE — DISCHARGE NOTE ADULT - PLAN OF CARE
treat infection Continue IV antibiotics until 3/28/2018. Follow up with your podiatrist in 3-4 days. continue antibiotics as above. less than 130/80 continue home blood pressure medications HgA1C less than 7 We increased your lantus to 46 units sq at bedtime. Continue Humalog 8 units with meals. Follow up with your doctor in 1 week. Continue IV antibiotics until 3/28/2018. Follow up with your podiatrist dr. Mcgowan in 3-4 days. continue lisinopril. We increased your lantus to 46 units sq at bedtime. Continue Humalog 15 units with meals. Follow up with your doctor in 1 week. Continue IV antibiotics until 3/28/2018. Follow up with podiatrist dr. Senior at [phone] [place] [address] on Tuesday/Wednesday of upcoming week for office procedure.

## 2018-02-16 NOTE — DISCHARGE NOTE ADULT - HOSPITAL COURSE
2 yr old F from home, lives with son, ambulates independently with former smoker, PMH of IDDM, HTN, HLD, peripheral neuropathy, chronic back and leg pain came with complain b/l foot ulcer with erythema of foot with left ankle swelling  for one day.  Patient was admitted for cellulitis of the right foot and was started on IV Zosyn.  MRI showed Acute osteomyelitis of the distal phalanx of the fourth toe with surrounding cellulitis. Started on Vancomycin, ID following.  Uncontrolled diabetes, HgA1C was 11.3. 2 yr old F from home, lives with son, ambulates independently with former smoker, PMH of IDDM, HTN, HLD, peripheral neuropathy, chronic back and leg pain came with complain b/l foot ulcer with erythema of foot with left ankle swelling  for one day.  Patient was admitted for cellulitis of the right foot and was started on IV Zosyn.  MRI showed Acute osteomyelitis of the distal phalanx of the fourth toe with surrounding cellulitis. Started on Vancomycin, ID following.  Uncontrolled diabetes, HgA1C was 11.3. She was seen by endocrinologist who has adjusted her insulin. Cardiac eval was done in case she needs future interventions per podiatry. She received PICC line for total 6 weeks abx. Also noted with candida UTI, treated with diflucan.  < from: Transthoracic Echocardiogram (02.16.18 @ 19:13) >  CONCLUSIONS:  1. Normal Left Ventricular Systolic Function,  (EF = 55 to  60%)  2. Grade II diastolic dysfunction.    < from: Nuclear Stress Test-Pharmacologic (02.16.18 @ 07:54) >  IMPRESSIONS:Normal Study  * Negative ECG evidence of ischemia after IV of Lexiscan.  * Review of raw data shows: Diaphragmatic artifact.  * There is a small, mild to moderate defect in basal  inferior wall that is fixed consistent with diaphragmatic  attenuation artifact.  * Gated wall motion analysis is performed, and shows  normal wall motion with post stress LVEF of 60%.  < from: US Duplex Arterial Lower Ext Compl, Bilateral (02.14.18 @ 16:08) >    FINDINGS: There are biphasic waveforms bilaterally. There is no abnormal   segmental pressure gradient.    Right SANDHYA = 1.31  Left SANDHYA = 1.34    IMPRESSION: Slightly elevated ABIs which may be seen with calcified   vessels. 2 yr old F from home, lives with son, ambulates independently with former smoker, PMH of IDDM, HTN, HLD, peripheral neuropathy, chronic back and leg pain came with complain b/l foot ulcer with erythema of foot with left ankle swelling  for one day.  Patient was admitted for cellulitis of the right foot and was started on IV Zosyn.  MRI showed Acute osteomyelitis of the distal phalanx of the fourth toe with surrounding cellulitis. Started on Vancomycin, ID following.  Uncontrolled diabetes, HgA1C was 11.3. She was seen by endocrinologist who has adjusted her insulin. Cardiac eval was done in case she needs future interventions per podiatry. She received PICC line for total 6 weeks abx. Also noted with candida UTI, treated with diflucan.  < from: Transthoracic Echocardiogram (02.16.18 @ 19:13) >  CONCLUSIONS:  1. Normal Left Ventricular Systolic Function,  (EF = 55 to  60%)  2. Grade II diastolic dysfunction.    < from: Nuclear Stress Test-Pharmacologic (02.16.18 @ 07:54) >  IMPRESSIONS:Normal Study  * Negative ECG evidence of ischemia after IV of Lexiscan.  * Review of raw data shows: Diaphragmatic artifact.  * There is a small, mild to moderate defect in basal  inferior wall that is fixed consistent with diaphragmatic  attenuation artifact.  * Gated wall motion analysis is performed, and shows  normal wall motion with post stress LVEF of 60%.  < from: US Duplex Arterial Lower Ext Compl, Bilateral (02.14.18 @ 16:08) >    FINDINGS: There are biphasic waveforms bilaterally. There is no abnormal   segmental pressure gradient.    Right SANDHYA = 1.31  Left SANDHYA = 1.34    IMPRESSION: Slightly elevated ABIs which may be seen with calcified   vessels.  She is stable for discharge home with iv antibiotics and podiatry follow up Wed/thursday.

## 2018-02-16 NOTE — DISCHARGE NOTE ADULT - CARE PROVIDER_API CALL
Elle Christine), [place] [address]  Phone: [phone]  Fax: [phone] Elle Christine), Medicine  [address] [place] [address]  Phone: [phone]  Fax: [phone]    Rebeka Stewart (MARBIN), EndocrinologyMetabDiabetes  [address] [place] [address]  Phone: [phone]  Fax: [phone] Elle Christine), Medicine  [address] [place] [address]  Phone: [phone]  Fax: [phone]    Rebeka Stewart (MARBIN), EndocrinologyMetabDiabetes  [address] [place] [address]  Phone: [phone]  Fax: [phone]

## 2018-02-16 NOTE — DISCHARGE NOTE ADULT - CARE PLAN
Principal Discharge DX:	Osteomyelitis  Secondary Diagnosis:	Diabetic foot infection  Secondary Diagnosis:	HTN (hypertension)  Secondary Diagnosis:	Uncontrolled diabetes mellitus Principal Discharge DX:	Osteomyelitis  Goal:	treat infection  Assessment and plan of treatment:	Continue IV antibiotics until 3/28/2018. Follow up with your podiatrist in 3-4 days.  Secondary Diagnosis:	Diabetic foot infection  Assessment and plan of treatment:	continue antibiotics as above.  Secondary Diagnosis:	HTN (hypertension)  Goal:	less than 130/80  Assessment and plan of treatment:	continue home blood pressure medications  Secondary Diagnosis:	Uncontrolled diabetes mellitus  Goal:	HgA1C less than 7  Assessment and plan of treatment:	We increased your lantus to 46 units sq at bedtime. Continue Humalog 8 units with meals. Follow up with your doctor in 1 week. Principal Discharge DX:	Osteomyelitis  Goal:	treat infection  Assessment and plan of treatment:	Continue IV antibiotics until 3/28/2018. Follow up with your podiatrist dr. Mcgowan in 3-4 days.  Secondary Diagnosis:	Diabetic foot infection  Assessment and plan of treatment:	continue antibiotics as above.  Secondary Diagnosis:	HTN (hypertension)  Goal:	less than 130/80  Assessment and plan of treatment:	continue lisinopril.  Secondary Diagnosis:	Uncontrolled diabetes mellitus  Goal:	HgA1C less than 7  Assessment and plan of treatment:	We increased your lantus to 46 units sq at bedtime. Continue Humalog 15 units with meals. Follow up with your doctor in 1 week. Principal Discharge DX:	Osteomyelitis  Goal:	treat infection  Assessment and plan of treatment:	Continue IV antibiotics until 3/28/2018. Follow up with podiatrist dr. Senior [place] [address] [address] on Tuesday/Wednesday of upcoming week for office procedure.  Secondary Diagnosis:	Diabetic foot infection  Assessment and plan of treatment:	continue antibiotics as above.  Secondary Diagnosis:	HTN (hypertension)  Goal:	less than 130/80  Assessment and plan of treatment:	continue lisinopril.  Secondary Diagnosis:	Uncontrolled diabetes mellitus  Goal:	HgA1C less than 7  Assessment and plan of treatment:	We increased your lantus to 46 units sq at bedtime. Continue Humalog 15 units with meals. Follow up with your doctor in 1 week.

## 2018-02-16 NOTE — DISCHARGE NOTE ADULT - MEDICATION SUMMARY - MEDICATIONS TO CHANGE
I will SWITCH the dose or number of times a day I take the medications listed below when I get home from the hospital:    insulin glargine 100 units/mL subcutaneous solution  -- 34 unit(s) subcutaneous once a day (at bedtime)    sertraline 50 mg oral tablet  -- 1 tab(s) by mouth once a day I will SWITCH the dose or number of times a day I take the medications listed below when I get home from the hospital:    insulin glargine 100 units/mL subcutaneous solution  -- 34 unit(s) subcutaneous once a day (at bedtime)    sertraline 50 mg oral tablet  -- 1 tab(s) by mouth once a day    insulin lispro 100 units/mL subcutaneous solution  -- 8 unit(s) subcutaneous 2 times a day (before meals)

## 2018-02-16 NOTE — DISCHARGE NOTE ADULT - MEDICATION SUMMARY - MEDICATIONS TO TAKE
I will START or STAY ON the medications listed below when I get home from the hospital:    aspirin 81 mg oral delayed release tablet  -- 1 tab(s) by mouth once a day  -- Indication: For Cad    acetaminophen 325 mg oral tablet  -- 2 tab(s) by mouth every 6 hours, As needed, For Temp greater than 38 C (100.4 F)  -- Indication: For fever/pain    insulin glargine 100 units/mL subcutaneous solution  -- 46 unit(s) subcutaneous once a day (at bedtime)  -- Indication: For Diabetes    insulin lispro 100 units/mL subcutaneous solution  -- 8 unit(s) subcutaneous 2 times a day (before meals)  -- Indication: For Diabetes    metFORMIN 1000 mg oral tablet  -- 1 tab(s) by mouth 2 times a day (with meals)  -- Indication: For Diabetes    ZyrTEC  --  by mouth   -- Indication: For allergies    simvastatin 10 mg oral tablet  -- 1 tab(s) by mouth once a day (at bedtime)  -- Indication: For Candida UTI    collagenase 250 units/g topical ointment  -- 1 application on skin once  -- Indication: For Other acute osteomyelitis    famotidine 20 mg oral tablet  -- 1 tab(s) by mouth once a day  -- Indication: For gerd    Dexilant 60 mg oral delayed release capsule  -- 1 cap(s) by mouth once a day  -- Indication: For gerd I will START or STAY ON the medications listed below when I get home from the hospital:    dc left picc line at the end of therapy on 3/28/2018  -- 1 application intravenous once a day   -- Indication: For Osteomyelitis    cbc, cmp, esr, crp and Vancomycin trough once a week starting 2/21/2018  -- 1 application intradermal once a week   -- Indication: For Osteomyelitis    normal saline  -- 5 milliliter(s) intravenous 2 times a day , pre and post infusion  -- Indication: For Osteomyelitis    aspirin 81 mg oral delayed release tablet  -- 1 tab(s) by mouth once a day  -- Indication: For Cad    acetaminophen 325 mg oral tablet  -- 2 tab(s) by mouth every 6 hours, As needed, For Temp greater than 38 C (100.4 F)  -- Indication: For fever/pain    lisinopril 5 mg oral tablet  -- 1 tab(s) by mouth once a day  -- Indication: For HTN (hypertension)    Heparin Lock Flush 10 units/mL intravenous solution  -- 10 unit(s) intravenously every 24 hours , 5ml post infusion  -- Indication: For Osteomyelitis    sertraline 100 mg oral tablet  -- 1 tab(s) by mouth once a day  -- Indication: For Depresssion    insulin glargine 100 units/mL subcutaneous solution  -- 46 unit(s) subcutaneous once a day (at bedtime)  -- Indication: For Diabetes    insulin lispro 100 units/mL subcutaneous solution  -- 8 unit(s) subcutaneous 2 times a day (before meals)  -- Indication: For Diabetes    metFORMIN 1000 mg oral tablet  -- 1 tab(s) by mouth 2 times a day (with meals)  -- Indication: For Diabetes    ZyrTEC 10 mg oral tablet  -- 1 tab(s) by mouth once a day  -- Indication: For allergies    simvastatin 10 mg oral tablet  -- 1 tab(s) by mouth once a day (at bedtime)  -- Indication: For Candida UTI    gemfibrozil 600 mg oral tablet  -- 1 tab(s) by mouth 2 times a day (before meals)  -- Indication: For Triglycerides    collagenase 250 units/g topical ointment  -- 1 application on skin once  -- Indication: For Other acute osteomyelitis    vancomycin 1 g/200 mL-NaCl 0.9% intravenous solution  -- 1.25 gram(s) intravenously every 12 hours until 3/28/2018  -- Indication: For Osteomyelitis    famotidine 20 mg oral tablet  -- 1 tab(s) by mouth once a day  -- Indication: For gerd    piperacillin-tazobactam 2 g-0.25 g intravenous injection  -- 3.375 gram(s) intravenous every 8 hours  until 3/28/2018   -- Indication: For Osteomyelitis    Dexilant 60 mg oral delayed release capsule  -- 1 cap(s) by mouth once a day  -- Indication: For gerd I will START or STAY ON the medications listed below when I get home from the hospital:    dc left picc line at the end of therapy on 3/28/2018  -- 1 application intravenous once a day   -- Indication: For Osteomyelitis    cbc, cmp, esr, crp and Vancomycin trough once a week starting 2/21/2018  -- 1 application intradermal once a week   -- Indication: For Osteomyelitis    normal saline  -- 5 milliliter(s) intravenous 2 times a day , pre and post infusion  -- Indication: For Osteomyelitis    aspirin 81 mg oral delayed release tablet  -- 1 tab(s) by mouth once a day  -- Indication: For Cad    acetaminophen 325 mg oral tablet  -- 2 tab(s) by mouth every 6 hours, As needed, For Temp greater than 38 C (100.4 F)  -- Indication: For fever/pain    lisinopril 5 mg oral tablet  -- 1 tab(s) by mouth once a day  -- Indication: For HTN (hypertension)    Heparin Lock Flush 10 units/mL intravenous solution  -- 10 unit(s) intravenously every 24 hours , 5ml post infusion  -- Indication: For Osteomyelitis    sertraline 100 mg oral tablet  -- 1 tab(s) by mouth once a day  -- Indication: For Depresssion    insulin glargine 100 units/mL subcutaneous solution  -- 46 unit(s) subcutaneous once a day (at bedtime)  -- Indication: For Diabetes    insulin lispro 100 units/mL subcutaneous solution  -- 15 unit(s) subcutaneous 3 times a day (before meals)  -- Indication: For Uncontrolled diabetes mellitus    metFORMIN 1000 mg oral tablet  -- 1 tab(s) by mouth 2 times a day (with meals)  -- Indication: For Diabetes    ZyrTEC 10 mg oral tablet  -- 1 tab(s) by mouth once a day  -- Indication: For allergies    gemfibrozil 600 mg oral tablet  -- 1 tab(s) by mouth 2 times a day (before meals)  -- Indication: For Triglycerides    simvastatin 10 mg oral tablet  -- 1 tab(s) by mouth once a day (at bedtime)  -- Indication: For Candida UTI    collagenase 250 units/g topical ointment  -- 1 application on skin once  -- Indication: For Other acute osteomyelitis    vancomycin 1 g/200 mL-NaCl 0.9% intravenous solution  -- 1.25 gram(s) intravenously every 12 hours until 3/28/2018  -- Indication: For Osteomyelitis    famotidine 20 mg oral tablet  -- 1 tab(s) by mouth once a day  -- Indication: For gerd    piperacillin-tazobactam 2 g-0.25 g intravenous injection  -- 3.375 gram(s) intravenous every 8 hours  until 3/28/2018   -- Indication: For Osteomyelitis    Dexilant 60 mg oral delayed release capsule  -- 1 cap(s) by mouth once a day  -- Indication: For gerd I will START or STAY ON the medications listed below when I get home from the hospital:    dc left picc line at the end of therapy on 3/28/2018  -- 1 application intravenous once a day   -- Indication: For Osteomyelitis    cbc, cmp, esr, crp and Vancomycin trough once a week starting 2/21/2018  -- 1 application intradermal once a week   -- Indication: For Osteomyelitis    normal saline  -- 5 milliliter(s) intravenous 2 times a day , pre and post infusion  -- Indication: For Osteomyelitis    aspirin 81 mg oral delayed release tablet  -- 1 tab(s) by mouth once a day  -- Indication: For Cad    acetaminophen 325 mg oral tablet  -- 2 tab(s) by mouth every 6 hours, As needed, For Temp greater than 38 C (100.4 F)  -- Indication: For fever/pain    lisinopril 5 mg oral tablet  -- 1 tab(s) by mouth once a day  -- Indication: For HTN (hypertension)    Heparin Lock Flush 10 units/mL intravenous solution  -- 10 unit(s) intravenously every 24 hours , 5ml post infusion  -- Indication: For Osteomyelitis    sertraline 100 mg oral tablet  -- 1 tab(s) by mouth once a day  -- Indication: For Depresssion    insulin glargine 100 units/mL subcutaneous solution  -- 46 unit(s) subcutaneous once a day (at bedtime)  -- Indication: For Diabetes    insulin lispro 100 units/mL subcutaneous solution  -- 15 unit(s) subcutaneous 3 times a day (before meals)  -- Indication: For Uncontrolled diabetes mellitus    metFORMIN 1000 mg oral tablet  -- 1 tab(s) by mouth 2 times a day (with meals)  -- Indication: For Diabetes    fluconazole 200 mg oral tablet  -- 1 tab(s) by mouth once a day  -- Indication: For Uti    ZyrTEC 10 mg oral tablet  -- 1 tab(s) by mouth once a day  -- Indication: For allergies    gemfibrozil 600 mg oral tablet  -- 1 tab(s) by mouth 2 times a day (before meals)  -- Indication: For Triglycerides    simvastatin 10 mg oral tablet  -- 1 tab(s) by mouth once a day (at bedtime)  -- Indication: For Candida UTI    collagenase 250 units/g topical ointment  -- 1 application on skin once  -- Indication: For Other acute osteomyelitis    vancomycin 1 g/200 mL-NaCl 0.9% intravenous solution  -- 1.25 gram(s) intravenously every 12 hours until 3/28/2018  -- Indication: For Osteomyelitis    famotidine 20 mg oral tablet  -- 1 tab(s) by mouth once a day  -- Indication: For gerd    piperacillin-tazobactam 2 g-0.25 g intravenous injection  -- 3.375 gram(s) intravenous every 8 hours  until 3/28/2018   -- Indication: For Osteomyelitis    Dexilant 60 mg oral delayed release capsule  -- 1 cap(s) by mouth once a day  -- Indication: For gerd I will START or STAY ON the medications listed below when I get home from the hospital:    dc left picc line at the end of therapy on 3/28/2018  -- 1 application intravenous once a day   -- Indication: For Osteomyelitis    cbc, cmp, esr, crp and Vancomycin trough once a week starting 2/21/2018  -- 1 application intradermal once a week   -- Indication: For Osteomyelitis    normal saline  -- 5 milliliter(s) intravenous 2 times a day , pre and post infusion  -- Indication: For Osteomyelitis    aspirin 81 mg oral delayed release tablet  -- 1 tab(s) by mouth once a day  -- Indication: For Cad    acetaminophen 325 mg oral tablet  -- 2 tab(s) by mouth every 6 hours, As needed, For Temp greater than 38 C (100.4 F)  -- Indication: For fever/pain    lisinopril 5 mg oral tablet  -- 0.5 tab(s) by mouth once a day  -- Indication: For HTN (hypertension)    Heparin Lock Flush 10 units/mL intravenous solution  -- 10 unit(s) intravenously every 24 hours , 5ml post infusion  -- Indication: For Osteomyelitis    sertraline 100 mg oral tablet  -- 1 tab(s) by mouth once a day  -- Indication: For Depresssion    insulin glargine 100 units/mL subcutaneous solution  -- 46 unit(s) subcutaneous once a day (at bedtime)  -- Indication: For Diabetes    metFORMIN 1000 mg oral tablet  -- 1 tab(s) by mouth 2 times a day (with meals)  -- Indication: For Diabetes    insulin lispro 100 units/mL subcutaneous solution  -- 15 unit(s) subcutaneous 3 times a day (before meals)  -- Indication: For Uncontrolled diabetes mellitus    fluconazole 200 mg oral tablet  -- 1 tab(s) by mouth once a day  -- Indication: For Uti    ZyrTEC 10 mg oral tablet  -- 1 tab(s) by mouth once a day  -- Indication: For allergies    gemfibrozil 600 mg oral tablet  -- 1 tab(s) by mouth 2 times a day (before meals)  -- Indication: For Triglycerides    simvastatin 10 mg oral tablet  -- 1 tab(s) by mouth once a day (at bedtime)  -- Indication: For Candida UTI    collagenase 250 units/g topical ointment  -- 1 application on skin once  -- Indication: For Other acute osteomyelitis    vancomycin 1 g/200 mL-NaCl 0.9% intravenous solution  -- 1.25 gram(s) intravenously every 12 hours until 3/28/2018  -- Indication: For Osteomyelitis    famotidine 20 mg oral tablet  -- 1 tab(s) by mouth once a day  -- Indication: For gerd    piperacillin-tazobactam 2 g-0.25 g intravenous injection  -- 3.375 gram(s) intravenous every 8 hours  until 3/28/2018   -- Indication: For Osteomyelitis    Dexilant 60 mg oral delayed release capsule  -- 1 cap(s) by mouth once a day  -- Indication: For gerd

## 2018-02-17 LAB
GLUCOSE BLDC GLUCOMTR-MCNC: 120 MG/DL — HIGH (ref 70–99)
GLUCOSE BLDC GLUCOMTR-MCNC: 131 MG/DL — HIGH (ref 70–99)
GLUCOSE BLDC GLUCOMTR-MCNC: 187 MG/DL — HIGH (ref 70–99)
GLUCOSE BLDC GLUCOMTR-MCNC: 210 MG/DL — HIGH (ref 70–99)
VANCOMYCIN TROUGH SERPL-MCNC: 6.6 UG/ML — LOW (ref 10–20)

## 2018-02-17 RX ADMIN — Medication 250 MILLIGRAM(S): at 17:19

## 2018-02-17 RX ADMIN — Medication 15 UNIT(S): at 08:18

## 2018-02-17 RX ADMIN — INSULIN GLARGINE 46 UNIT(S): 100 INJECTION, SOLUTION SUBCUTANEOUS at 21:57

## 2018-02-17 RX ADMIN — Medication 15 UNIT(S): at 17:20

## 2018-02-17 RX ADMIN — SERTRALINE 100 MILLIGRAM(S): 25 TABLET, FILM COATED ORAL at 12:05

## 2018-02-17 RX ADMIN — Medication 2: at 08:17

## 2018-02-17 RX ADMIN — FLUCONAZOLE 200 MILLIGRAM(S): 150 TABLET ORAL at 12:05

## 2018-02-17 RX ADMIN — LISINOPRIL 5 MILLIGRAM(S): 2.5 TABLET ORAL at 05:18

## 2018-02-17 RX ADMIN — PIPERACILLIN AND TAZOBACTAM 25 GRAM(S): 4; .5 INJECTION, POWDER, LYOPHILIZED, FOR SOLUTION INTRAVENOUS at 14:49

## 2018-02-17 RX ADMIN — PIPERACILLIN AND TAZOBACTAM 25 GRAM(S): 4; .5 INJECTION, POWDER, LYOPHILIZED, FOR SOLUTION INTRAVENOUS at 21:33

## 2018-02-17 RX ADMIN — Medication 15 UNIT(S): at 12:06

## 2018-02-17 RX ADMIN — Medication 250 MILLIGRAM(S): at 05:18

## 2018-02-17 RX ADMIN — SIMVASTATIN 10 MILLIGRAM(S): 20 TABLET, FILM COATED ORAL at 21:33

## 2018-02-17 RX ADMIN — PIPERACILLIN AND TAZOBACTAM 25 GRAM(S): 4; .5 INJECTION, POWDER, LYOPHILIZED, FOR SOLUTION INTRAVENOUS at 05:18

## 2018-02-17 RX ADMIN — Medication 81 MILLIGRAM(S): at 12:05

## 2018-02-17 RX ADMIN — Medication 1: at 12:06

## 2018-02-17 RX ADMIN — Medication 600 MILLIGRAM(S): at 05:18

## 2018-02-17 RX ADMIN — Medication 600 MILLIGRAM(S): at 17:20

## 2018-02-17 RX ADMIN — ENOXAPARIN SODIUM 40 MILLIGRAM(S): 100 INJECTION SUBCUTANEOUS at 12:06

## 2018-02-17 NOTE — DIETITIAN INITIAL EVALUATION ADULT. - MD RECOMMEND
continue with consistent carbohydrate w/evening snack,DASH/TLC & add MVI/minerals,ZBEC for wound healing, as medically feasible

## 2018-02-17 NOTE — DIETITIAN INITIAL EVALUATION ADULT. - OTHER INFO
Nutrition consult requested for assessment. Patient from home lives with family. Visited pt. reports appetite good, eats 3 meals daily, denies nausea/vomiting or diarrhea PTA, stated  Lbs & intentional lost wt. 50 Lbs of 2 yrs following [facility] wt. loss program, current wt. 285 Lbs. Per diabetic >10yrs & take metformin & insulin but not regular with blood glucose testing at home. Pt. accepted diabetic education & copies on My Plate Planner with carbohydrate counting/weight loss tips, receptive & reinforce information given. In house consuming 50% of meals & tolerating, foot ulcer/diabetic with wound care noted, Endocrinologist consult noted. D/W RN/MD.

## 2018-02-17 NOTE — DIETITIAN INITIAL EVALUATION ADULT. - PROBLEM SELECTOR PLAN 1
Patient c/o B/L foot ulcers with Erythema, warmth, swelling of left lateral side of foot, and 4th toes b/l  On PE: ulcer on 4th toe nails b/l with erythema, swelling and warmth. Left lateral side of foot is also swollen, erythema and warmth noted.  fissuring in interdigital toe spaces, no evidence of superficial skein infection -->tinea pedis and eczema  S/p one dose of vancomycin and zosyn  -Patient has been treated with multiple antibiotics in past ,Augmentin was given 1 month ago.  -Will continue zosyn  -Predisposing factors for development of cellulitis, obesity, DM, peripheral neuropathy  -Xray shows no erosions  -F/u Blood cultures  ID: Dr. Mason  Podiatry consult  Consult to Dr. Han as she may need cardio clearance for any type surgery if podiatry will do

## 2018-02-17 NOTE — DIETITIAN INITIAL EVALUATION ADULT. - PROBLEM SELECTOR PLAN 2
Poorly controlled diabetes  As per patient last HbA1C was 6.5, and she is compliant to her medication,  She takes metformin, trulicity, toujeo (36 units) Humalog (15 units) Jardiance.  Will continue HSS, half home dose of lantus and humalog.   Monitor fingersticks  F/u HbA1C  Consult Dr. Stewart

## 2018-02-18 LAB
GLUCOSE BLDC GLUCOMTR-MCNC: 168 MG/DL — HIGH (ref 70–99)
GLUCOSE BLDC GLUCOMTR-MCNC: 174 MG/DL — HIGH (ref 70–99)
GLUCOSE BLDC GLUCOMTR-MCNC: 215 MG/DL — HIGH (ref 70–99)
GLUCOSE BLDC GLUCOMTR-MCNC: 95 MG/DL — SIGNIFICANT CHANGE UP (ref 70–99)
VANCOMYCIN TROUGH SERPL-MCNC: 8.5 UG/ML — LOW (ref 10–20)

## 2018-02-18 RX ORDER — TRAMADOL HYDROCHLORIDE 50 MG/1
25 TABLET ORAL ONCE
Qty: 0 | Refills: 0 | Status: DISCONTINUED | OUTPATIENT
Start: 2018-02-18 | End: 2018-02-18

## 2018-02-18 RX ADMIN — FLUCONAZOLE 200 MILLIGRAM(S): 150 TABLET ORAL at 11:33

## 2018-02-18 RX ADMIN — Medication 81 MILLIGRAM(S): at 11:33

## 2018-02-18 RX ADMIN — Medication 250 MILLIGRAM(S): at 05:57

## 2018-02-18 RX ADMIN — Medication 15 UNIT(S): at 16:26

## 2018-02-18 RX ADMIN — Medication 600 MILLIGRAM(S): at 08:33

## 2018-02-18 RX ADMIN — Medication 250 MILLIGRAM(S): at 17:55

## 2018-02-18 RX ADMIN — Medication 15 UNIT(S): at 08:34

## 2018-02-18 RX ADMIN — Medication 15 UNIT(S): at 11:34

## 2018-02-18 RX ADMIN — LISINOPRIL 5 MILLIGRAM(S): 2.5 TABLET ORAL at 05:57

## 2018-02-18 RX ADMIN — PIPERACILLIN AND TAZOBACTAM 25 GRAM(S): 4; .5 INJECTION, POWDER, LYOPHILIZED, FOR SOLUTION INTRAVENOUS at 05:57

## 2018-02-18 RX ADMIN — PIPERACILLIN AND TAZOBACTAM 25 GRAM(S): 4; .5 INJECTION, POWDER, LYOPHILIZED, FOR SOLUTION INTRAVENOUS at 21:37

## 2018-02-18 RX ADMIN — TRAMADOL HYDROCHLORIDE 25 MILLIGRAM(S): 50 TABLET ORAL at 23:06

## 2018-02-18 RX ADMIN — Medication 2: at 11:22

## 2018-02-18 RX ADMIN — TRAMADOL HYDROCHLORIDE 25 MILLIGRAM(S): 50 TABLET ORAL at 23:52

## 2018-02-18 RX ADMIN — Medication 600 MILLIGRAM(S): at 16:26

## 2018-02-18 RX ADMIN — PIPERACILLIN AND TAZOBACTAM 25 GRAM(S): 4; .5 INJECTION, POWDER, LYOPHILIZED, FOR SOLUTION INTRAVENOUS at 14:04

## 2018-02-18 RX ADMIN — SIMVASTATIN 10 MILLIGRAM(S): 20 TABLET, FILM COATED ORAL at 21:37

## 2018-02-18 RX ADMIN — Medication 1: at 21:37

## 2018-02-18 RX ADMIN — INSULIN GLARGINE 46 UNIT(S): 100 INJECTION, SOLUTION SUBCUTANEOUS at 21:36

## 2018-02-18 RX ADMIN — ENOXAPARIN SODIUM 40 MILLIGRAM(S): 100 INJECTION SUBCUTANEOUS at 11:33

## 2018-02-18 RX ADMIN — SERTRALINE 100 MILLIGRAM(S): 25 TABLET, FILM COATED ORAL at 11:33

## 2018-02-18 NOTE — PROGRESS NOTE ADULT - PROBLEM SELECTOR PLAN 1
Bilateral 4tth toe osteomyelitis, plan for six week long term iv antibiotics. Pending final results of blood cultures, NGTD. As per podiatry will benefit from flexor tenotomies because of flexible hammertoe deformity causing reulcerations distal digitally. Pt will follow up outpatient with podiatry  IV ABX X 6 WEEKS NEEDS PICC LINE

## 2018-02-19 LAB
CULTURE RESULTS: SIGNIFICANT CHANGE UP
CULTURE RESULTS: SIGNIFICANT CHANGE UP
GLUCOSE BLDC GLUCOMTR-MCNC: 105 MG/DL — HIGH (ref 70–99)
GLUCOSE BLDC GLUCOMTR-MCNC: 135 MG/DL — HIGH (ref 70–99)
GLUCOSE BLDC GLUCOMTR-MCNC: 139 MG/DL — HIGH (ref 70–99)
GLUCOSE BLDC GLUCOMTR-MCNC: 92 MG/DL — SIGNIFICANT CHANGE UP (ref 70–99)
SPECIMEN SOURCE: SIGNIFICANT CHANGE UP
SPECIMEN SOURCE: SIGNIFICANT CHANGE UP

## 2018-02-19 PROCEDURE — 71045 X-RAY EXAM CHEST 1 VIEW: CPT | Mod: 26

## 2018-02-19 RX ORDER — VANCOMYCIN HCL 1 G
1250 VIAL (EA) INTRAVENOUS EVERY 12 HOURS
Qty: 0 | Refills: 0 | Status: DISCONTINUED | OUTPATIENT
Start: 2018-02-19 | End: 2018-02-21

## 2018-02-19 RX ORDER — VANCOMYCIN HCL 1 G
1.25 VIAL (EA) INTRAVENOUS
Qty: 50 | Refills: 0 | OUTPATIENT
Start: 2018-02-19 | End: 2018-03-26

## 2018-02-19 RX ORDER — ACETAMINOPHEN 500 MG
2 TABLET ORAL
Qty: 0 | Refills: 0 | COMMUNITY
Start: 2018-02-19

## 2018-02-19 RX ORDER — PIPERACILLIN AND TAZOBACTAM 4; .5 G/20ML; G/20ML
3.38 INJECTION, POWDER, LYOPHILIZED, FOR SOLUTION INTRAVENOUS
Qty: 360 | Refills: 0 | OUTPATIENT
Start: 2018-02-19 | End: 2018-03-26

## 2018-02-19 RX ORDER — LISINOPRIL 2.5 MG/1
1 TABLET ORAL
Qty: 30 | Refills: 0 | OUTPATIENT
Start: 2018-02-19 | End: 2018-03-20

## 2018-02-19 RX ORDER — COLLAGENASE CLOSTRIDIUM HIST. 250 UNIT/G
1 OINTMENT (GRAM) TOPICAL
Qty: 0 | Refills: 0 | COMMUNITY
Start: 2018-02-19

## 2018-02-19 RX ORDER — FAMOTIDINE 10 MG/ML
1 INJECTION INTRAVENOUS
Qty: 0 | Refills: 0 | COMMUNITY

## 2018-02-19 RX ORDER — VANCOMYCIN HCL 1 G
1.25 VIAL (EA) INTRAVENOUS
Qty: 90 | Refills: 0 | OUTPATIENT
Start: 2018-02-19 | End: 2018-03-26

## 2018-02-19 RX ORDER — GEMFIBROZIL 600 MG
1 TABLET ORAL
Qty: 60 | Refills: 0 | OUTPATIENT
Start: 2018-02-19 | End: 2018-03-20

## 2018-02-19 RX ORDER — SERTRALINE 25 MG/1
1 TABLET, FILM COATED ORAL
Qty: 30 | Refills: 0 | OUTPATIENT
Start: 2018-02-19 | End: 2018-03-20

## 2018-02-19 RX ADMIN — Medication 15 UNIT(S): at 16:47

## 2018-02-19 RX ADMIN — PIPERACILLIN AND TAZOBACTAM 25 GRAM(S): 4; .5 INJECTION, POWDER, LYOPHILIZED, FOR SOLUTION INTRAVENOUS at 06:18

## 2018-02-19 RX ADMIN — PIPERACILLIN AND TAZOBACTAM 25 GRAM(S): 4; .5 INJECTION, POWDER, LYOPHILIZED, FOR SOLUTION INTRAVENOUS at 13:19

## 2018-02-19 RX ADMIN — SIMVASTATIN 10 MILLIGRAM(S): 20 TABLET, FILM COATED ORAL at 22:34

## 2018-02-19 RX ADMIN — Medication 250 MILLIGRAM(S): at 06:19

## 2018-02-19 RX ADMIN — Medication 15 UNIT(S): at 11:48

## 2018-02-19 RX ADMIN — INSULIN GLARGINE 46 UNIT(S): 100 INJECTION, SOLUTION SUBCUTANEOUS at 22:34

## 2018-02-19 RX ADMIN — PIPERACILLIN AND TAZOBACTAM 25 GRAM(S): 4; .5 INJECTION, POWDER, LYOPHILIZED, FOR SOLUTION INTRAVENOUS at 22:34

## 2018-02-19 RX ADMIN — FLUCONAZOLE 200 MILLIGRAM(S): 150 TABLET ORAL at 11:48

## 2018-02-19 RX ADMIN — Medication 166.67 MILLIGRAM(S): at 17:02

## 2018-02-19 RX ADMIN — SERTRALINE 100 MILLIGRAM(S): 25 TABLET, FILM COATED ORAL at 11:48

## 2018-02-19 RX ADMIN — Medication 600 MILLIGRAM(S): at 16:47

## 2018-02-19 RX ADMIN — Medication 15 UNIT(S): at 08:11

## 2018-02-19 RX ADMIN — ENOXAPARIN SODIUM 40 MILLIGRAM(S): 100 INJECTION SUBCUTANEOUS at 11:48

## 2018-02-19 RX ADMIN — Medication 600 MILLIGRAM(S): at 06:18

## 2018-02-19 RX ADMIN — LISINOPRIL 5 MILLIGRAM(S): 2.5 TABLET ORAL at 06:18

## 2018-02-19 RX ADMIN — Medication 81 MILLIGRAM(S): at 11:48

## 2018-02-19 NOTE — CHART NOTE - NSCHARTNOTEFT_GEN_A_CORE
pt seen as CM asked to assist in dc planning IV infusion services. Pt s/p picc line for long term abx for +OM. Doing well, labs reviewed, pt with no complaints.  DC planning initiated, scripts written and given to CM. pt seen as CM asked to assist in dc planning IV infusion services. Pt s/p picc line for long term abx for +OM. Doing well, labs reviewed, pt with no complaints.  d/w ID dr. Hobbs, patient will need  Vancomycin 1250mg ivbp q12 until 3/28/2018.  Zosyn 3.375g ivpb q8hrs until 3/28/2018.  weekly crp,esr, vanco trough.      DC planning initiated, scripts written and given to CM.    Abigail Negrete NP

## 2018-02-19 NOTE — PROGRESS NOTE ADULT - PROBLEM SELECTOR PLAN 1
Bilateral 4tth toe osteomyelitis, plan for six week long term iv antibiotics. Pending final results of blood cultures, NGTD. As per podiatry will benefit from flexor tenotomies because of flexible hammertoe deformity causing reulcerations distal digitally. Pt will follow up outpatient with podiatry  IV ABX X 6 WEEKS has picc line

## 2018-02-20 LAB
ANION GAP SERPL CALC-SCNC: 7 MMOL/L — SIGNIFICANT CHANGE UP (ref 5–17)
BUN SERPL-MCNC: 18 MG/DL — SIGNIFICANT CHANGE UP (ref 7–18)
CALCIUM SERPL-MCNC: 9.5 MG/DL — SIGNIFICANT CHANGE UP (ref 8.4–10.5)
CHLORIDE SERPL-SCNC: 103 MMOL/L — SIGNIFICANT CHANGE UP (ref 96–108)
CO2 SERPL-SCNC: 29 MMOL/L — SIGNIFICANT CHANGE UP (ref 22–31)
CREAT SERPL-MCNC: 1 MG/DL — SIGNIFICANT CHANGE UP (ref 0.5–1.3)
GLUCOSE BLDC GLUCOMTR-MCNC: 125 MG/DL — HIGH (ref 70–99)
GLUCOSE BLDC GLUCOMTR-MCNC: 137 MG/DL — HIGH (ref 70–99)
GLUCOSE BLDC GLUCOMTR-MCNC: 139 MG/DL — HIGH (ref 70–99)
GLUCOSE BLDC GLUCOMTR-MCNC: 166 MG/DL — HIGH (ref 70–99)
GLUCOSE BLDC GLUCOMTR-MCNC: 211 MG/DL — HIGH (ref 70–99)
GLUCOSE SERPL-MCNC: 173 MG/DL — HIGH (ref 70–99)
HCT VFR BLD CALC: 43.7 % — SIGNIFICANT CHANGE UP (ref 34.5–45)
HGB BLD-MCNC: 14.4 G/DL — SIGNIFICANT CHANGE UP (ref 11.5–15.5)
MCHC RBC-ENTMCNC: 30.5 PG — SIGNIFICANT CHANGE UP (ref 27–34)
MCHC RBC-ENTMCNC: 33.1 GM/DL — SIGNIFICANT CHANGE UP (ref 32–36)
MCV RBC AUTO: 92.1 FL — SIGNIFICANT CHANGE UP (ref 80–100)
PLATELET # BLD AUTO: 239 K/UL — SIGNIFICANT CHANGE UP (ref 150–400)
POTASSIUM SERPL-MCNC: 4.6 MMOL/L — SIGNIFICANT CHANGE UP (ref 3.5–5.3)
POTASSIUM SERPL-SCNC: 4.6 MMOL/L — SIGNIFICANT CHANGE UP (ref 3.5–5.3)
RBC # BLD: 4.74 M/UL — SIGNIFICANT CHANGE UP (ref 3.8–5.2)
RBC # FLD: 13.1 % — SIGNIFICANT CHANGE UP (ref 10.3–14.5)
SODIUM SERPL-SCNC: 139 MMOL/L — SIGNIFICANT CHANGE UP (ref 135–145)
VANCOMYCIN TROUGH SERPL-MCNC: 9.8 UG/ML — LOW (ref 10–20)
WBC # BLD: 9.6 K/UL — SIGNIFICANT CHANGE UP (ref 3.8–10.5)
WBC # FLD AUTO: 9.6 K/UL — SIGNIFICANT CHANGE UP (ref 3.8–10.5)

## 2018-02-20 RX ORDER — LISINOPRIL 2.5 MG/1
2.5 TABLET ORAL DAILY
Qty: 0 | Refills: 0 | Status: DISCONTINUED | OUTPATIENT
Start: 2018-02-20 | End: 2018-02-21

## 2018-02-20 RX ORDER — FLUCONAZOLE 150 MG/1
200 TABLET ORAL DAILY
Qty: 0 | Refills: 0 | Status: DISCONTINUED | OUTPATIENT
Start: 2018-02-20 | End: 2018-02-21

## 2018-02-20 RX ORDER — FLUCONAZOLE 150 MG/1
1 TABLET ORAL
Qty: 8 | Refills: 0 | OUTPATIENT
Start: 2018-02-20 | End: 2018-02-27

## 2018-02-20 RX ADMIN — Medication 15 UNIT(S): at 08:26

## 2018-02-20 RX ADMIN — Medication 2: at 21:17

## 2018-02-20 RX ADMIN — PIPERACILLIN AND TAZOBACTAM 25 GRAM(S): 4; .5 INJECTION, POWDER, LYOPHILIZED, FOR SOLUTION INTRAVENOUS at 06:30

## 2018-02-20 RX ADMIN — Medication 600 MILLIGRAM(S): at 18:02

## 2018-02-20 RX ADMIN — LISINOPRIL 5 MILLIGRAM(S): 2.5 TABLET ORAL at 06:30

## 2018-02-20 RX ADMIN — Medication 166.67 MILLIGRAM(S): at 06:31

## 2018-02-20 RX ADMIN — Medication 81 MILLIGRAM(S): at 12:20

## 2018-02-20 RX ADMIN — PIPERACILLIN AND TAZOBACTAM 25 GRAM(S): 4; .5 INJECTION, POWDER, LYOPHILIZED, FOR SOLUTION INTRAVENOUS at 13:47

## 2018-02-20 RX ADMIN — INSULIN GLARGINE 46 UNIT(S): 100 INJECTION, SOLUTION SUBCUTANEOUS at 21:16

## 2018-02-20 RX ADMIN — PIPERACILLIN AND TAZOBACTAM 25 GRAM(S): 4; .5 INJECTION, POWDER, LYOPHILIZED, FOR SOLUTION INTRAVENOUS at 21:17

## 2018-02-20 RX ADMIN — Medication 15 UNIT(S): at 16:36

## 2018-02-20 RX ADMIN — Medication 600 MILLIGRAM(S): at 06:30

## 2018-02-20 RX ADMIN — Medication 1: at 12:21

## 2018-02-20 RX ADMIN — SERTRALINE 100 MILLIGRAM(S): 25 TABLET, FILM COATED ORAL at 12:20

## 2018-02-20 RX ADMIN — Medication 166.67 MILLIGRAM(S): at 18:49

## 2018-02-20 RX ADMIN — Medication 15 UNIT(S): at 12:20

## 2018-02-20 RX ADMIN — FLUCONAZOLE 200 MILLIGRAM(S): 150 TABLET ORAL at 12:20

## 2018-02-20 RX ADMIN — SIMVASTATIN 10 MILLIGRAM(S): 20 TABLET, FILM COATED ORAL at 21:16

## 2018-02-20 RX ADMIN — ENOXAPARIN SODIUM 40 MILLIGRAM(S): 100 INJECTION SUBCUTANEOUS at 12:21

## 2018-02-20 NOTE — PROGRESS NOTE ADULT - PROBLEM SELECTOR PLAN 4
continue flucanzole for total of 2 weeks
IMPROVE SCORE 1  continue heparin for DVT prophylaxis
IMPROVE SCORE 1  continue heparin for DVT prophylaxis
continue flucanzole for total of 2 weeks

## 2018-02-20 NOTE — PROGRESS NOTE ADULT - PROBLEM SELECTOR PROBLEM 4
Candida UTI
Prophylactic measure
Prophylactic measure
Candida UTI

## 2018-02-20 NOTE — PROGRESS NOTE ADULT - PROBLEM SELECTOR PLAN 3
stable with lisinopril , pt had stress test , no ischemia , continue aspirin.
cardiology consulted and following -- continue lisinopril  continue to monitor BP per floor protocol  continue DASH diet
cardiology consulted and following -- continue lisinopril  continue to monitor BP per floor protocol  continue DASH diet
stable with lisinopril , pt had stress test , no ischemia , continue aspirin.

## 2018-02-20 NOTE — PROGRESS NOTE ADULT - PROBLEM SELECTOR PLAN 2
continue with insulin coverage as ordered by endocrinology, Upon discharge will switched back to metformin as per endocrinology. Pt received education regarding diabetes and control, Dietician evaluation
Hgb A1C 11.3. patient on metformin and insulin at home  Mayra Stewart consulted and following-- continue Lantus 30 units qhs, and Humalog 15 units ac tid  continue to monitor fingersticks with sliding scale coverage  continue diabetic diet
Hgb A1C 11.3. patient on metformin and insulin at home  Mayra Stewart consulted and following-- continue Lantus 30 units qhs, and Humalog 15 units ac tid  continue to monitor fingersticks with sliding scale coverage  continue diabetic diet
continue with insulin coverage as ordered by endocrinology, Upon discharge will switched back to metformin as per endocrinology. Pt received education regarding diabetes and control, Dietician evaluation

## 2018-02-20 NOTE — PROGRESS NOTE ADULT - PROBLEM SELECTOR PLAN 5
Continue Lovenox for dvt PPX
Patient to Continue IV ABX for total 6 weeks. No contraindication for PICC line insertion.  Will discuss with IR for placement tomorrow AM; coags ordered

## 2018-02-20 NOTE — PROGRESS NOTE ADULT - PROBLEM SELECTOR PROBLEM 5
Prophylactic measure
Discharge planning issues
Discharge planning issues
Prophylactic measure

## 2018-02-20 NOTE — PROGRESS NOTE ADULT - PROBLEM SELECTOR PROBLEM 6
Discharge planning issues

## 2018-02-20 NOTE — PROGRESS NOTE ADULT - PROBLEM SELECTOR PROBLEM 2
Uncontrolled other specified diabetes mellitus with autonomic neuropathy, unspecified long term insulin use status
Diabetes
Diabetes
Uncontrolled other specified diabetes mellitus with autonomic neuropathy, unspecified long term insulin use status

## 2018-02-20 NOTE — PROGRESS NOTE ADULT - PROBLEM SELECTOR PLAN 6
Await clearance from ID  for desposition, Pt eval. pt can WBAT on the right but no restrictions on the left
dc planning

## 2018-02-20 NOTE — PROGRESS NOTE ADULT - PROBLEM SELECTOR PROBLEM 1
Diabetic foot infection
Diabetic foot infection
Other acute osteomyelitis

## 2018-02-20 NOTE — PROGRESS NOTE ADULT - PROBLEM SELECTOR PROBLEM 3
Essential hypertension
HTN (hypertension)
HTN (hypertension)
Essential hypertension

## 2018-02-21 VITALS
DIASTOLIC BLOOD PRESSURE: 63 MMHG | HEART RATE: 67 BPM | OXYGEN SATURATION: 95 % | TEMPERATURE: 98 F | RESPIRATION RATE: 14 BRPM | SYSTOLIC BLOOD PRESSURE: 101 MMHG

## 2018-02-21 LAB
GLUCOSE BLDC GLUCOMTR-MCNC: 102 MG/DL — HIGH (ref 70–99)
GLUCOSE BLDC GLUCOMTR-MCNC: 126 MG/DL — HIGH (ref 70–99)
GLUCOSE BLDC GLUCOMTR-MCNC: 182 MG/DL — HIGH (ref 70–99)

## 2018-02-21 PROCEDURE — 82607 VITAMIN B-12: CPT

## 2018-02-21 PROCEDURE — 93005 ELECTROCARDIOGRAM TRACING: CPT

## 2018-02-21 PROCEDURE — A9502: CPT

## 2018-02-21 PROCEDURE — 80202 ASSAY OF VANCOMYCIN: CPT

## 2018-02-21 PROCEDURE — 77001 FLUOROGUIDE FOR VEIN DEVICE: CPT

## 2018-02-21 PROCEDURE — 83735 ASSAY OF MAGNESIUM: CPT

## 2018-02-21 PROCEDURE — 87040 BLOOD CULTURE FOR BACTERIA: CPT

## 2018-02-21 PROCEDURE — 86140 C-REACTIVE PROTEIN: CPT

## 2018-02-21 PROCEDURE — 76937 US GUIDE VASCULAR ACCESS: CPT

## 2018-02-21 PROCEDURE — 85027 COMPLETE CBC AUTOMATED: CPT

## 2018-02-21 PROCEDURE — 84443 ASSAY THYROID STIM HORMONE: CPT

## 2018-02-21 PROCEDURE — 80061 LIPID PANEL: CPT

## 2018-02-21 PROCEDURE — 93925 LOWER EXTREMITY STUDY: CPT

## 2018-02-21 PROCEDURE — 96374 THER/PROPH/DIAG INJ IV PUSH: CPT

## 2018-02-21 PROCEDURE — 84100 ASSAY OF PHOSPHORUS: CPT

## 2018-02-21 PROCEDURE — 93306 TTE W/DOPPLER COMPLETE: CPT

## 2018-02-21 PROCEDURE — 99285 EMERGENCY DEPT VISIT HI MDM: CPT | Mod: 25

## 2018-02-21 PROCEDURE — 93017 CV STRESS TEST TRACING ONLY: CPT

## 2018-02-21 PROCEDURE — 73718 MRI LOWER EXTREMITY W/O DYE: CPT

## 2018-02-21 PROCEDURE — 85652 RBC SED RATE AUTOMATED: CPT

## 2018-02-21 PROCEDURE — C1751: CPT

## 2018-02-21 PROCEDURE — 80048 BASIC METABOLIC PNL TOTAL CA: CPT

## 2018-02-21 PROCEDURE — 85730 THROMBOPLASTIN TIME PARTIAL: CPT

## 2018-02-21 PROCEDURE — 85610 PROTHROMBIN TIME: CPT

## 2018-02-21 PROCEDURE — 73630 X-RAY EXAM OF FOOT: CPT

## 2018-02-21 PROCEDURE — 80053 COMPREHEN METABOLIC PANEL: CPT

## 2018-02-21 PROCEDURE — 71045 X-RAY EXAM CHEST 1 VIEW: CPT

## 2018-02-21 PROCEDURE — 78452 HT MUSCLE IMAGE SPECT MULT: CPT

## 2018-02-21 PROCEDURE — 83036 HEMOGLOBIN GLYCOSYLATED A1C: CPT

## 2018-02-21 PROCEDURE — 82962 GLUCOSE BLOOD TEST: CPT

## 2018-02-21 PROCEDURE — 36569 INSJ PICC 5 YR+ W/O IMAGING: CPT

## 2018-02-21 PROCEDURE — 83605 ASSAY OF LACTIC ACID: CPT

## 2018-02-21 PROCEDURE — 96375 TX/PRO/DX INJ NEW DRUG ADDON: CPT

## 2018-02-21 PROCEDURE — 82746 ASSAY OF FOLIC ACID SERUM: CPT

## 2018-02-21 PROCEDURE — 82009 KETONE BODYS QUAL: CPT

## 2018-02-21 PROCEDURE — 81001 URINALYSIS AUTO W/SCOPE: CPT

## 2018-02-21 RX ADMIN — Medication 166.67 MILLIGRAM(S): at 06:57

## 2018-02-21 RX ADMIN — Medication 15 UNIT(S): at 17:33

## 2018-02-21 RX ADMIN — SERTRALINE 100 MILLIGRAM(S): 25 TABLET, FILM COATED ORAL at 12:49

## 2018-02-21 RX ADMIN — Medication 600 MILLIGRAM(S): at 17:33

## 2018-02-21 RX ADMIN — PIPERACILLIN AND TAZOBACTAM 25 GRAM(S): 4; .5 INJECTION, POWDER, LYOPHILIZED, FOR SOLUTION INTRAVENOUS at 15:31

## 2018-02-21 RX ADMIN — FLUCONAZOLE 200 MILLIGRAM(S): 150 TABLET ORAL at 12:47

## 2018-02-21 RX ADMIN — LISINOPRIL 2.5 MILLIGRAM(S): 2.5 TABLET ORAL at 06:56

## 2018-02-21 RX ADMIN — Medication 600 MILLIGRAM(S): at 06:57

## 2018-02-21 RX ADMIN — Medication 81 MILLIGRAM(S): at 12:47

## 2018-02-21 RX ADMIN — Medication 15 UNIT(S): at 08:23

## 2018-02-21 RX ADMIN — Medication 1: at 08:22

## 2018-02-21 RX ADMIN — ENOXAPARIN SODIUM 40 MILLIGRAM(S): 100 INJECTION SUBCUTANEOUS at 12:47

## 2018-02-21 RX ADMIN — Medication 15 UNIT(S): at 12:48

## 2018-02-21 RX ADMIN — PIPERACILLIN AND TAZOBACTAM 25 GRAM(S): 4; .5 INJECTION, POWDER, LYOPHILIZED, FOR SOLUTION INTRAVENOUS at 06:57

## 2018-02-21 NOTE — PROGRESS NOTE ADULT - ASSESSMENT
52 yr old F from home, lives with son, ambulates independently with former smoker, PMH of IDDM, HTN, HLD, peripheral neuropathy, chronic back and leg pain came with complain b/l foot ulcer with erythema of foot with left ankle swelling since Saturday,osteomeyelitis.  1.Lisinopril 5mg qd.  2.DM-as per Endocrine.  3.ABX as per ID.  4.LIpid d/o-statin and lopid.  5.Podiatry f/u.  6.Cont asa and statin.  7.GI and DVT prophylaxis.
52 yr old F from home, lives with son, ambulates independently with former smoker, PMH of IDDM, HTN, HLD, peripheral neuropathy, chronic back and leg pain came with complain b/l foot ulcer with erythema of foot with left ankle swelling since Saturday,osteomeyelitis.  1.Continue Lisinopril 2.5mg qd.  2.DM-as per Endocrine.  3.ABX as per ID.  4.LIpid d/o-statin and lopid.  5.Podiatry f/u.  6.Cont asa and statin.  7.GI and DVT prophylaxis.
52 yr old F from home, lives with son, ambulates independently with former smoker, PMH of IDDM, HTN, HLD, peripheral neuropathy, chronic back and leg pain came with complain b/l foot ulcer with erythema of foot with left ankle swelling since Saturday,osteomeyelitis.  1.Dec Lisinopril 2.5mg qd.  2.DM-as per Endocrine.  3.ABX as per ID.  4.LIpid d/o-statin and lopid.  5.Podiatry f/u.  6.Cont asa and statin.  7.GI and DVT prophylaxis.
52 yr old F from home, lives with son, ambulates independently with former smoker, PMH of IDDM, HTN, HLD, peripheral neuropathy, chronic back and leg pain came with complain b/l foot ulcer with erythema of foot with left ankle swelling since Saturday,osteomeyelitis.  1.Stress test-R/O ischemia.  2.Lisinopril 5mg qd.  3.DM-as per Endocrine.  4.ABX as per ID.  5.LIpid d/o-statin and lopid.  6.Podiatry f/u.  7.Cont asa and statin.  8.Echocardiogram.  9.GI and DVT prophylaxis.
52 yr old F from home, lives with son, ambulates independently with former smoker, PMH of IDDM, HTN, HLD, peripheral neuropathy, chronic back and leg pain came with complain b/l foot ulcer with erythema of foot with left ankle swelling since Saturday,osteomeyelitis.  1.Stress test-R/O ischemia.  2.Lisinopril 5mg qd.  3.DM-as per Endocrine.  4.ABX as per ID.  5.LIpid d/o-statin and lopid.  6.Podiatry f/u.  7.Cont asa and statin.  8.Echocardiogram.  9.GI and DVT prophylaxis.
HPI:  52 yr old F from home, lives with son, ambulates independently with former smoker, PMH of IDDM, HTN, HLD, peripheral neuropathy, chronic back and leg pain came with complain b/l foot ulcer with erythema of foot with left ankle swelling since Saturday. She states that she developed two open ulcer b/l on 4th toes for which she was doing dressing with silver sulfazine. she progressively develop erythema, warmth and swelling of her left ankle and 4th toes b/l then she decided to come hospital. She also had fever of 100F on Saturday She denies cough, dyspnea, chest pain, abdominal pain, foot pain, N/V urinary or bowel complains, She has similar episodes of cellulitis of foot in past and was one time diagnosed with osteomyelitis was treated with long term antibiotic via PICC line. She has also been treated with Augmentin 1 month ago for similar type of cellulitis.     In ED, Patient's vitals sign were stable,  Labs grossly normal. S/p one dose of vancomycin an zosyn. When patient seen by me she was sitting comfortably, Xray of foot shows no erosions (14 Feb 2018 04:36)
52 yr old F from home, lives with son, ambulates independently; former smoker, PMH of IDDM, HTN, HLD, peripheral neuropathy, chronic back and leg pain came with complain b/l foot ulcer with erythema of foot with left ankle swelling since Saturday. She states that she developed two open ulcer b/l on 4th toes for which she was doing dressing with silver sulfazine. She progressively develop erythema, warmth and swelling of her left ankle and 4th toes b/l then she decided to come hospital. She also had fever of 100F on Saturday She denies cough, dyspnea, chest pain, abdominal pain, foot pain, N/V urinary or bowel complains, She has had similar episodes of cellulitis of foot in past and was one time diagnosed with osteomyelitis was treated with long term antibiotic via PICC line. She has also been treated with Augmentin 1 month ago for similar type of cellulitis.   In ED, Patient's vitals sign were stable,  Labs grossly normal. S/p one dose of vancomycin an zosyn. When patient seen by me she was sitting comfortably.  Admitted to medicine for further management.
HPI:  52 yr old F from home, lives with son, ambulates independently with former smoker, PMH of IDDM, HTN, HLD, peripheral neuropathy, chronic back and leg pain came with complain b/l foot ulcer with erythema of foot with left ankle swelling since Saturday. She states that she developed two open ulcer b/l on 4th toes for which she was doing dressing with silver sulfazine. she progressively develop erythema, warmth and swelling of her left ankle and 4th toes b/l then she decided to come hospital. She also had fever of 100F on Saturday She denies cough, dyspnea, chest pain, abdominal pain, foot pain, N/V urinary or bowel complains, She has similar episodes of cellulitis of foot in past and was one time diagnosed with osteomyelitis was treated with long term antibiotic via PICC line. She has also been treated with Augmentin 1 month ago for similar type of cellulitis.     In ED, Patient's vitals sign were stable,  Labs grossly normal. S/p one dose of vancomycin an zosyn. When patient seen by me she was sitting comfortably, Xray of foot shows no erosions (14 Feb 2018 04:36)

## 2018-02-21 NOTE — PROGRESS NOTE ADULT - SUBJECTIVE AND OBJECTIVE BOX
CHIEF COMPLAINT:Patient is a 52y old  Female who presents with a chief complaint of B/l foot ulcer/ Left ankle swelling .Pt appears comfortable.    	  REVIEW OF SYSTEMS:  CONSTITUTIONAL: No fever, weight loss, or fatigue  EYES: No eye pain, visual disturbances, or discharge  ENT:  No difficulty hearing, tinnitus, vertigo; No sinus or throat pain  NECK: No pain or stiffness  RESPIRATORY: No cough, wheezing, chills or hemoptysis; No Shortness of Breath  CARDIOVASCULAR: No chest pain, palpitations, passing out, dizziness, or leg swelling  GASTROINTESTINAL: No abdominal or epigastric pain. No nausea, vomiting, or hematemesis; No diarrhea or constipation. No melena or hematochezia.  GENITOURINARY: No dysuria, frequency, hematuria, or incontinence  NEUROLOGICAL: No headaches, memory loss, loss of strength, numbness, or tremors  SKIN: No itching, burning, rashes, or lesions   LYMPH Nodes: No enlarged glands  ENDOCRINE: No heat or cold intolerance; No hair loss  MUSCULOSKELETAL: No joint pain or swelling; No muscle, back, or extremity pain  PSYCHIATRIC: No depression, anxiety, mood swings, or difficulty sleeping  HEME/LYMPH: No easy bruising, or bleeding gums  ALLERGY AND IMMUNOLOGIC: No hives or eczema	      PHYSICAL EXAM:  T(C): 36.4 (02-17-18 @ 05:18), Max: 36.6 (02-16-18 @ 14:57)  HR: 55 (02-17-18 @ 05:18) (55 - 72)  BP: 118/60 (02-17-18 @ 05:18) (118/60 - 168/79)  RR: 16 (02-17-18 @ 05:18) (14 - 16)  SpO2: 96% (02-17-18 @ 05:18) (96% - 97%)    I&O's Summary    16 Feb 2018 07:01  -  17 Feb 2018 07:00  --------------------------------------------------------  IN: 450 mL / OUT: 0 mL / NET: 450 mL        Appearance: Normal	  HEENT:   Normal oral mucosa, PERRL, EOMI	  Lymphatic: No lymphadenopathy  Cardiovascular: Normal S1 S2, No JVD, No murmurs, No edema  Respiratory: Lungs clear to auscultation	  Psychiatry: A & O x 3, Mood & affect appropriate  Gastrointestinal:  Soft, Non-tender, + BS	  Skin: No rashes, No ecchymoses, No cyanosis	  Neurologic: Non-focal  Extremities: Normal range of motion, No clubbing, cyanosis or edema  Vascular: Peripheral pulses palpable 2+ bilaterally    MEDICATIONS  (STANDING):  aspirin  chewable 81 milliGRAM(s) Oral daily  collagenase Ointment 1 Application(s) Topical once  enoxaparin Injectable 40 milliGRAM(s) SubCutaneous daily  fluconAZOLE   Tablet 200 milliGRAM(s) Oral daily  gemfibrozil 600 milliGRAM(s) Oral two times a day before meals  insulin glargine Injectable (LANTUS) 46 Unit(s) SubCutaneous at bedtime  insulin lispro (HumaLOG) corrective regimen sliding scale   SubCutaneous Before meals and at bedtime  insulin lispro Injectable (HumaLOG) 15 Unit(s) SubCutaneous three times a day before meals  lisinopril 5 milliGRAM(s) Oral daily  piperacillin/tazobactam IVPB. 3.375 Gram(s) IV Intermittent every 8 hours  sertraline 100 milliGRAM(s) Oral daily  simvastatin 10 milliGRAM(s) Oral at bedtime  sodium chloride 0.9% lock flush 20 milliLiter(s) IV Push once  vancomycin  IVPB 1000 milliGRAM(s) IV Intermittent every 12 hours  vancomycin  IVPB            	  LABS:	 	                        12.9   8.4   )-----------( 215      ( 16 Feb 2018 06:21 )             38.8     02-16    141  |  104  |  11  ----------------------------<  229<H>  3.9   |  30  |  0.79    Ca    8.9      16 Feb 2018 06:21  Phos  3.5     02-16  Mg     1.9     02-16    TPro  6.7  /  Alb  3.1<L>  /  TBili  1.1  /  DBili  x   /  AST  14  /  ALT  21  /  AlkPhos  88  02-16      Lipid Profile: Cholesterol 134  LDL 60  HDL 35      HgA1c: Hemoglobin A1C, Whole Blood: 11.3 % (02-15 @ 09:54)    TSH: Thyroid Stimulating Hormone, Serum: 1.42 uU/mL (02-14 @ 10:48)    Stress test:    IMPRESSIONS:Normal Study  * Negative ECG evidence of ischemia after IV of Lexiscan.  * Review of raw data shows: Diaphragmatic artifact.  * There is a small, mild to moderate defect in basal  inferior wall that is fixed consistent with diaphragmatic  attenuation artifact.  * Gated wall motion analysis is performed, and shows  normal wall motion with post stress LVEF of 60%.    	    OBSERVATIONS:  Mitral Valve: Normal mitral valve.  Aortic Root: Aortic Root: 3.5 cm.    Aortic Valve: Normal trileaflet aortic valve.  Left Ventricle: Normal Left Ventricular Systolic Function,  (EF = 55 to 60%) Normal left ventricular internal  dimensions and wall thicknesses. Grade II diastolic  dysfunction.  Right Heart: Normal right atrium. Normal right ventricular  size and function. Normal tricuspid valve. Normal pulmonic  valve.  Pericardium/PleuraNormal pericardium with no pericardial  effusion.
CHIEF COMPLAINT:Patient is a 52y old  Female who presents with a chief complaint of B/l foot ulcer/ Left ankle swelling. Pt appears comfortable.    	  REVIEW OF SYSTEMS:  CONSTITUTIONAL: No fever, weight loss, or fatigue  EYES: No eye pain, visual disturbances, or discharge  ENT:  No difficulty hearing, tinnitus, vertigo; No sinus or throat pain  NECK: No pain or stiffness  RESPIRATORY: No cough, wheezing, chills or hemoptysis; No Shortness of Breath  CARDIOVASCULAR: No chest pain, palpitations, passing out, dizziness, or leg swelling  GASTROINTESTINAL: No abdominal or epigastric pain. No nausea, vomiting, or hematemesis; No diarrhea or constipation. No melena or hematochezia.  GENITOURINARY: No dysuria, frequency, hematuria, or incontinence  NEUROLOGICAL: No headaches, memory loss, loss of strength, numbness, or tremors  SKIN: No itching, burning, rashes, or lesions   LYMPH Nodes: No enlarged glands  ENDOCRINE: No heat or cold intolerance; No hair loss  MUSCULOSKELETAL: No joint pain or swelling; No muscle, back, or extremity pain  PSYCHIATRIC: No depression, anxiety, mood swings, or difficulty sleeping  HEME/LYMPH: No easy bruising, or bleeding gums  ALLERGY AND IMMUNOLOGIC: No hives or eczema	    PHYSICAL EXAM:  T(C): 36.7 (02-19-18 @ 05:00), Max: 36.8 (02-18-18 @ 20:50)  HR: 64 (02-19-18 @ 05:00) (64 - 78)  BP: 124/79 (02-19-18 @ 05:00) (107/69 - 124/79)  RR: 16 (02-19-18 @ 05:00) (14 - 16)  SpO2: 98% (02-19-18 @ 05:00) (95% - 98%)      Appearance: Normal	  HEENT:   Normal oral mucosa, PERRL, EOMI	  Lymphatic: No lymphadenopathy  Cardiovascular: Normal S1 S2, No JVD, No murmurs, No edema  Respiratory: Lungs clear to auscultation	  Psychiatry: A & O x 3, Mood & affect appropriate  Gastrointestinal:  Soft, Non-tender, + BS	  Skin: No rashes, No ecchymoses, No cyanosis	  Neurologic: Non-focal  Extremities: Normal range of motion, No clubbing, cyanosis or edema  Vascular: Peripheral pulses palpable 2+ bilaterally    MEDICATIONS  (STANDING):  aspirin  chewable 81 milliGRAM(s) Oral daily  collagenase Ointment 1 Application(s) Topical once  enoxaparin Injectable 40 milliGRAM(s) SubCutaneous daily  fluconAZOLE   Tablet 200 milliGRAM(s) Oral daily  gemfibrozil 600 milliGRAM(s) Oral two times a day before meals  insulin glargine Injectable (LANTUS) 46 Unit(s) SubCutaneous at bedtime  insulin lispro (HumaLOG) corrective regimen sliding scale   SubCutaneous Before meals and at bedtime  insulin lispro Injectable (HumaLOG) 15 Unit(s) SubCutaneous three times a day before meals  lisinopril 5 milliGRAM(s) Oral daily  piperacillin/tazobactam IVPB. 3.375 Gram(s) IV Intermittent every 8 hours  sertraline 100 milliGRAM(s) Oral daily  simvastatin 10 milliGRAM(s) Oral at bedtime  sodium chloride 0.9% lock flush 20 milliLiter(s) IV Push once  vancomycin  IVPB 1250 milliGRAM(s) IV Intermittent every 12 hours      	  LABS:	 	      Lipid Profile: Cholesterol 134  LDL 60  HDL 35      HgA1c: Hemoglobin A1C, Whole Blood: 11.3 % (02-15 @ 09:54)    TSH: Thyroid Stimulating Hormone, Serum: 1.42 uU/mL (02-14 @ 10:48)
Meds:  fluconAZOLE   Tablet 200 milliGRAM(s) Oral daily  piperacillin/tazobactam IVPB. 3.375 Gram(s) IV Intermittent every 8 hours    Allergies:  Allergies    iodinated radiocontrast agents (Hives)    Intolerances    ROS  [  ] UNABLE TO ELICIT    General:  [  ] None  [  ] Fever  [  ] Chills  [ x ] Malaise    Skin:  [  ] None [  ] Rash  [  ] Wound  [ x ] Ulcer    HEENT:  [ x ] None  [  ] Sore Throat  [  ] Nasal congestion/ runny nose  [  ] Photophobia [  ] Neck pain      Chest:  [ x ] None   [  ] SOB  [  ] Cough  [  ] None    Cardiovascular:   [ x ] None  [  ] CP  [  ] Palpitation    Gastrointestinal:  [ x ] None  [  ] Abd pain   [  ] Nausea    [  ] Vomiting   [  ] Diarrhea	     Genitourinary:  [ x ] None [  ] Polyuria   [  ] Urgency  [  ] Frequency  [  ] Dysuria    [  ]  Hematuria       Musculoskeletal:  [  ] None [  ] Back Pain	[  ] Body aches  [ x ] Right 4th toe wound and right foot redness and swelling.    Neurological: [  ] None [  ]Dizziness  [  ]Visual Disturbance  [  ]Headaches   [ x ] Weakness              PHYSICAL EXAM:    Vital Signs Last 24 Hrs  T(C): 36.6 (15 Feb 2018 04:55), Max: 36.9 (14 Feb 2018 16:16)  T(F): 97.9 (15 Feb 2018 04:55), Max: 98.5 (14 Feb 2018 16:16)  HR: 59 (15 Feb 2018 04:55) (59 - 74)  BP: 120/68 (15 Feb 2018 04:55) (120/68 - 148/80)  BP(mean): 98 (14 Feb 2018 21:46) (98 - 98)  RR: 18 (15 Feb 2018 04:55) (16 - 18)  SpO2: 98% (15 Feb 2018 04:55) (97% - 98%)    Constitutional:    HEENT: [ x ] Wnl  [  ] Pharyngeal congestion    Neck:  [ x ] Supple  [  ]Lymphadenopathy  [  ] No JVD   [  ] JVD  [  ] Masses   [  ] WNL    CHEST/Respiratory:  [ x ]Clear to auscultation  [  ] Rales   [  ] Rhonchi   [  ] Wheezing     [  ] Chest Tenderness      Cardiovascular:  [ x ] Reg S1 S2   [  ] Irreg S1 S2   [ x ]No Murmur  [  ] +ve Murmurs  [  ]Systolic [  ]Diastolic      Abdomen:  [ x ] Soft  [ x ] No tendrerness  [  ] Tenderness  [  ] Organomegaly  [  ] ABD Distention  [  ] Rigidity                       [ x ] No Regidity                       [ x ] No Rebound Tenderness  [  ] No Guarding Rigidity  [  ] Rebound Tenderness[  ] Guarding Rigidity                          [ x ]  +ve Bowel Sounds  [  ] Decreased Bowel Sounds    [  ] Absent Bowel Sounds                            Extremities: [  ] No edema [ x ] Edema right foot [  ] Clubbing   [  ] Cyanosis                         [ x ] No Tender Calf muscles  [  ] Tender Calf muscles                        [ x ] Palpable peripheral pulses  [ x ] Right 4th toe wound and right foot redness and swelling, +VE tenia pedis with tissue infection. +VE dry scab Left 4th toe.    Neurological: [ x ] Awake  [ x ] Alert  [ x ] Oriented  x  3                           [  ] Confused  [  ] Drowzy  [  ] repond to painful stimuli  [  ] Unresponsive    Skin:  [  ] Intact [  ] Redness [  ] Thrombophlebitis  [  ] Rashes  [  ] Dry  [ x ] Ulcers/ wounds both 4th toes.    Ortho:  [  ] Joint Swelling  [  ] Joint erythema [  ] Joint tenderness                [  ] Increased temp. to touch  [ x ] DJD [  ] WNL            LABS/DIAGNOSTIC TESTS                          13.2   8.6   )-----------( 218      ( 15 Feb 2018 07:27 )             40.6         02-15    139  |  104  |  12  ----------------------------<  238<H>  4.3   |  30  |  0.76    Ca    9.0      15 Feb 2018 07:27  Phos  3.3     02-15  Mg     1.8     02-15    TPro  7.9  /  Alb  3.5  /  TBili  1.0  /  DBili  x   /  AST  17  /  ALT  24  /  AlkPhos  130<H>  02-14      LIVER FUNCTIONS - ( 14 Feb 2018 01:52 )  Alb: 3.5 g/dL / Pro: 7.9 g/dL / ALK PHOS: 130 U/L / ALT: 24 U/L DA / AST: 17 U/L / GGT: x               CULTURES:   Culture - Blood (02.14.18 @ 09:49)    Specimen Source: .Blood Blood-Peripheral    Culture Results:   No growth to date.    Culture - Blood (02.14.18 @ 09:49)    Specimen Source: .Blood Blood-Peripheral    Culture Results:   No growth to date.      EXAM:  MR FOOT RT                            PROCEDURE DATE:  02/15/2018          INTERPRETATION:  History: History of type 2 diabetes. Pain.    Multiplanar multisequence noncontrast MRI of the right foot was performed   from the level of the toes to the level of the talonavicular joint.    Correlation is made with prior radiographs from February 14, 2018.    Findings:    There is cutaneous ulceration at the tip of the fourth toe with   surrounding soft tissue edema and skin thickening consistent with   cellulitis. There is associated osseous edema and hypointense T1 marrow   signal within the distal phalanx of the fourth toe consistent with acute   osteomyelitis.    There is extensive osseous edema centered about the navicular cuneiform   and second through fifth tarsometatarsal articulations with joint space   narrowing and areas of subchondral cystic change. Osseous productive   change and osseous fragmentation is noted at the second and third   tarsometatarsal articulations. These findings are most suggestive of   neuropathic osteoarthropathy. Transversely oriented hypointense line is   noted at the base of the third metatarsal without surrounding osseous   edema likely related to the sequela of a chronic fracture at this site.    There is moderate hallux valgus with moderate first metatarsophalangeal   and hallux sesamoid joint arthrosis. Overlying soft tissue and osseous   bunion is demonstrated. Hammertoe deformities of the first through fourth   toes is demonstrated. Lisfranc ligament appears grossly intact.    There is fatty atrophy and edema within the plantar muscles suggestive of   denervation related changes.    Impression:    Acute osteomyelitis of the distal phalanx of the fourth toe with   surrounding cellulitis.    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   Evidence of prior fracture along the plantar aspect of the third   metatarsal.          Assessment and Recommendation:   52 yr old F from home, lives with son, ambulates independently with former smoker, PMH of IDDM, HTN, HLD, peripheral neuropathy, chronic back and leg pain came with complain b/l foot ulcer with erythema of foot with left ankle swelling  for one day.  Patient was admitted for cellulitis of the right foot and was started on IV Zosyn.  MRI showed Acute osteomyelitis of the distal phalanx of the fourth toe with surrounding cellulitis.      Problem/Recommendation - 1:  Problem: Cellulitis of toe of right foot.   Recommendation:   1- UA & CS.  2- Follow Blood culture to final report.  3- Diflucan 200 mg po q day x 2 weeks.  4- Continue IV Zosyn, and start Vancomycin 1 gm IVPB q 12 hours and follow trough closely.  5- Fluid and electrolytes management.  6- CBC and BMP follow up.   7- MRI was +VE for osteomyelitis.  8- Continue IV ABX for total 6 weeks.  9- No contraindication for PICC line insertion.    Problem/Recommendation - 2:  ·  Problem: Uncontrolled diabetes mellitus.    Recommendation:   1- Blood sugar monitoring and control.  2- Accu-Cheks with coverage.  3- 1800 tana ADA diet.  4- Follow HB A1C.     Problem/Recommendation - 3:  ·  Problem: HTN (hypertension).    Recommendation:   1- Monitor Blood pressure closely.  2- Blood pressure control.  3- BP. meds as per cardiology and primary care team.       Discussed with NP covering for this patient.
Meds:  fluconAZOLE   Tablet 200 milliGRAM(s) Oral daily  piperacillin/tazobactam IVPB. 3.375 Gram(s) IV Intermittent every 8 hours.  Vancomycin 1 gm IVPB q 12 hours.    Allergies:  Allergies    iodinated radiocontrast agents (Hives)    Intolerances      ROS  [  ] UNABLE TO ELICIT    General:  [  ] None  [  ] Fever  [  ] Chills  [ x ] Malaise    Skin:  [  ] None [  ] Rash  [  ] Wound  [ x ] Ulcer    HEENT:  [ x ] None  [  ] Sore Throat  [  ] Nasal congestion/ runny nose  [  ] Photophobia [  ] Neck pain      Chest:  [ x ] None   [  ] SOB  [  ] Cough  [  ] None    Cardiovascular:   [ x ] None  [  ] CP  [  ] Palpitation    Gastrointestinal:  [ x ] None  [  ] Abd pain   [  ] Nausea    [  ] Vomiting   [  ] Diarrhea	     Genitourinary:  [ x ] None [  ] Polyuria   [  ] Urgency  [  ] Frequency  [  ] Dysuria    [  ]  Hematuria       Musculoskeletal:  [  ] None [  ] Back Pain	[  ] Body aches  [ x ] Right 4th toe wound and right foot redness and swelling.    Neurological: [  ] None [  ]Dizziness  [  ]Visual Disturbance  [  ]Headaches   [ x ] Weakness          PHYSICAL EXAM:  Vital Signs Last 24 Hrs  T(C): 36.8 (17 Feb 2018 13:16), Max: 36.8 (17 Feb 2018 13:16)  T(F): 98.3 (17 Feb 2018 13:16), Max: 98.3 (17 Feb 2018 13:16)  HR: 74 (17 Feb 2018 13:16) (55 - 74)  BP: 119/70 (17 Feb 2018 13:16) (118/60 - 131/80)  BP(mean): 92 (16 Feb 2018 20:15) (92 - 92)  RR: 16 (17 Feb 2018 13:16) (16 - 16)  SpO2: 97% (17 Feb 2018 13:16) (96% - 97%)    Constitutional:    HEENT: [ x ] Wnl  [  ] Pharyngeal congestion    Neck:  [ x ] Supple  [  ]Lymphadenopathy  [  ] No JVD   [  ] JVD  [  ] Masses   [  ] WNL    CHEST/Respiratory:  [ x ]Clear to auscultation  [  ] Rales   [  ] Rhonchi   [  ] Wheezing     [  ] Chest Tenderness      Cardiovascular:  [ x ] Reg S1 S2   [  ] Irreg S1 S2   [ x ]No Murmur  [  ] +ve Murmurs  [  ]Systolic [  ]Diastolic      Abdomen:  [ x ] Soft  [ x ] No tendrerness  [  ] Tenderness  [  ] Organomegaly  [  ] ABD Distention  [  ] Rigidity                       [ x ] No Regidity                       [ x ] No Rebound Tenderness  [  ] No Guarding Rigidity  [  ] Rebound Tenderness[  ] Guarding Rigidity                          [ x ]  +ve Bowel Sounds  [  ] Decreased Bowel Sounds    [  ] Absent Bowel Sounds                            Extremities: [  ] No edema [ x ] Edema right foot [  ] Clubbing   [  ] Cyanosis                         [ x ] No Tender Calf muscles  [  ] Tender Calf muscles                        [ x ] Palpable peripheral pulses  [ x ] Right 4th toe wound and right foot redness and swelling, +VE tenia pedis with tissue infection. +VE dry scab Left 4th toe.    Neurological: [ x ] Awake  [ x ] Alert  [ x ] Oriented  x  3                           [  ] Confused  [  ] Drowzy  [  ] repond to painful stimuli  [  ] Unresponsive    Skin:  [  ] Intact [  ] Redness [  ] Thrombophlebitis  [  ] Rashes  [  ] Dry  [ x ] Ulcers/ wounds both 4th toes.    Ortho:  [  ] Joint Swelling  [  ] Joint erythema [  ] Joint tenderness                [  ] Increased temp. to touch  [ x ] DJD [  ] WNL            LABS/DIAGNOSTIC TESTS                        12.9   8.4   )-----------( 215      ( 16 Feb 2018 06:21 )             38.8   02-16    141  |  104  |  11  ----------------------------<  229<H>  3.9   |  30  |  0.79    Ca    8.9      16 Feb 2018 06:21  Phos  3.5     02-16  Mg     1.9     02-16    TPro  6.7  /  Alb  3.1<L>  /  TBili  1.1  /  DBili  x   /  AST  14  /  ALT  21  /  AlkPhos  88  02-16    Vancomycin Level, Trough (02.17.18 @ 04:28)    Vancomycin Level, Trough: 6.6: Vancomycin trough levels should be rapidly reached and maintained at  15-20 ug/ml for life threatening MRSA  infections such as sepsis, endocarditis, osteomyelitis and pneumonia. A  first trough level should be drawn  before the 3rd or 4th dose.  Risk of renal toxicity is increased for levels >15 ug/ml, in patients on  other nephrotoxic drugs, who are  hemodynamically unstable, have unstable renal function, or are on  Vancomycin therapy for >14 days. Renal function with  creatinine levels should be monitored for those patients. ug/mL        CULTURES:   Culture - Blood (02.14.18 @ 09:49)    Specimen Source: .Blood Blood-Peripheral    Culture Results:   No growth to date.    Culture - Blood (02.14.18 @ 09:49)    Specimen Source: .Blood Blood-Peripheral    Culture Results:   No growth to date.      EXAM:  MR FOOT RT                            PROCEDURE DATE:  02/15/2018          INTERPRETATION:  History: History of type 2 diabetes. Pain.    Multiplanar multisequence noncontrast MRI of the right foot was performed   from the level of the toes to the level of the talonavicular joint.    Correlation is made with prior radiographs from February 14, 2018.    Findings:    There is cutaneous ulceration at the tip of the fourth toe with   surrounding soft tissue edema and skin thickening consistent with   cellulitis. There is associated osseous edema and hypointense T1 marrow   signal within the distal phalanx of the fourth toe consistent with acute   osteomyelitis.    There is extensive osseous edema centered about the navicular cuneiform   and second through fifth tarsometatarsal articulations with joint space   narrowing and areas of subchondral cystic change. Osseous productive   change and osseous fragmentation is noted at the second and third   tarsometatarsal articulations. These findings are most suggestive of   neuropathic osteoarthropathy. Transversely oriented hypointense line is   noted at the base of the third metatarsal without surrounding osseous   edema likely related to the sequela of a chronic fracture at this site.    There is moderate hallux valgus with moderate first metatarsophalangeal   and hallux sesamoid joint arthrosis. Overlying soft tissue and osseous   bunion is demonstrated. Hammertoe deformities of the first through fourth   toes is demonstrated. Lisfranc ligament appears grossly intact.    There is fatty atrophy and edema within the plantar muscles suggestive of   denervation related changes.    Impression:    Acute osteomyelitis of the distal phalanx of the fourth toe with   surrounding cellulitis.    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   Evidence of prior fracture along the plantar aspect of the third   metatarsal.          Assessment and Recommendation:   52 yr old F from home, lives with son, ambulates independently with former smoker, PMH of IDDM, HTN, HLD, peripheral neuropathy, chronic back and leg pain came with complain b/l foot ulcer with erythema of foot with left ankle swelling  for one day.  Patient was admitted for cellulitis of the right foot and was started on IV Zosyn.  MRI showed Acute osteomyelitis of the distal phalanx of the fourth toe with surrounding cellulitis.      Problem/Recommendation - 1:  Problem: Cellulitis of toe of right foot.   Recommendation:   1- UA & CS.  2- Follow Blood culture to final report.  3- Diflucan 200 mg po q day x 2 weeks.  4- Continue IV Zosyn, and Vancomycin 1 gm IVPB q 12 hours ( If repeat vancomycin trough is less than 10 ) and continue to follow trough closely.  5- Fluid and electrolytes management.  6- CBC and BMP follow up.   7- MRI was +VE for osteomyelitis.  8- Continue IV ABX for total 6 weeks.  9- weekly ESR and CRP x 6 weeks for follow up.    Problem/Recommendation - 2:  ·  Problem: Uncontrolled diabetes mellitus.    Recommendation:   1- Blood sugar monitoring and control.  2- Accu-Cheks with coverage.  3- 1800 tana ADA diet.  4- Follow HB A1C.     Problem/Recommendation - 3:  ·  Problem: HTN (hypertension).    Recommendation:   1- Monitor Blood pressure closely.  2- Blood pressure control.  3- BP. meds as per cardiology and primary care team.       Discussed with NP covering for this patient, and with Patient.
Patient was seen and examined  Patient is a 52y old  Female who presents with a chief complaint of B/l foot ulcer/ Left ankle swelling (2018 04:36)      INTERVAL HPI/OVERNIGHT EVENTS:  T(C): 36.8 (02-15-18 @ 14:03), Max: 36.8 (18 @ 21:03)  HR: 77 (02-15-18 @ 14:03) (59 - 77)  BP: 128/75 (02-15-18 @ 14:03) (120/68 - 148/80)  RR: 18 (02-15-18 @ 14:03) (16 - 18)  SpO2: 96% (02-15-18 @ 14:03) (96% - 98%)  Wt(kg): --  I&O's Summary      LABS:                        13.2   8.6   )-----------( 218      ( 15 Feb 2018 07:27 )             40.6     02-15    139  |  104  |  12  ----------------------------<  238<H>  4.3   |  30  |  0.76    Ca    9.0      15 Feb 2018 07:27  Phos  3.3     02-15  Mg     1.8     -15    TPro  7.9  /  Alb  3.5  /  TBili  1.0  /  DBili  x   /  AST  17  /  ALT  24  /  AlkPhos  130<H>  02-14      Urinalysis Basic - ( 15 Feb 2018 14:14 )    Color: Yellow / Appearance: Clear / S.015 / pH: x  Gluc: x / Ketone: Negative  / Bili: Negative / Urobili: Negative   Blood: x / Protein: 15 / Nitrite: Negative   Leuk Esterase: Small / RBC: 0-2 /HPF / WBC 6-10 /HPF   Sq Epi: x / Non Sq Epi: Moderate /HPF / Bacteria: Few /HPF      CAPILLARY BLOOD GLUCOSE      POCT Blood Glucose.: 162 mg/dL (15 Feb 2018 16:31)  POCT Blood Glucose.: 305 mg/dL (15 Feb 2018 11:32)  POCT Blood Glucose.: 239 mg/dL (15 Feb 2018 07:43)  POCT Blood Glucose.: 223 mg/dL (2018 21:08)    LIPID PANEL  Cholesterol 134  LDL 60  HDL 35  RATIO HDL/Total Cholesterol --  Triglyceride 195        Urinalysis Basic - ( 15 Feb 2018 14:14 )    Color: Yellow / Appearance: Clear / S.015 / pH: x  Gluc: x / Ketone: Negative  / Bili: Negative / Urobili: Negative   Blood: x / Protein: 15 / Nitrite: Negative   Leuk Esterase: Small / RBC: 0-2 /HPF / WBC 6-10 /HPF   Sq Epi: x / Non Sq Epi: Moderate /HPF / Bacteria: Few /HPF        MEDICATIONS  (STANDING):  aspirin  chewable 81 milliGRAM(s) Oral daily  enoxaparin Injectable 40 milliGRAM(s) SubCutaneous daily  fluconAZOLE   Tablet 200 milliGRAM(s) Oral daily  gemfibrozil 600 milliGRAM(s) Oral two times a day before meals  insulin glargine Injectable (LANTUS) 30 Unit(s) SubCutaneous at bedtime  insulin lispro (HumaLOG) corrective regimen sliding scale   SubCutaneous Before meals and at bedtime  insulin lispro Injectable (HumaLOG) 15 Unit(s) SubCutaneous three times a day before meals  lisinopril 5 milliGRAM(s) Oral daily  piperacillin/tazobactam IVPB. 3.375 Gram(s) IV Intermittent every 8 hours  sertraline 100 milliGRAM(s) Oral daily  simvastatin 10 milliGRAM(s) Oral at bedtime  vancomycin  IVPB        MEDICATIONS  (PRN):      RADIOLOGY & ADDITIONAL TESTS:    Imaging Personally Reviewed:  [ ] YES  [ ] NO    REVIEW OF SYSTEMS:  CONSTITUTIONAL: No fever, weight loss, or fatigue  EYES: No eye pain, visual disturbances, or discharge  ENMT:  No difficulty hearing, tinnitus, vertigo; No sinus or throat pain  NECK: No pain or stiffness  BREASTS: No pain, masses, or nipple discharge  RESPIRATORY: No cough, wheezing, chills or hemoptysis; No shortness of breath  CARDIOVASCULAR: No chest pain, palpitations, dizziness, or leg swelling  GASTROINTESTINAL: No abdominal or epigastric pain. No nausea, vomiting, or hematemesis; No diarrhea or constipation. No melena or hematochezia.  GENITOURINARY: No dysuria, frequency, hematuria, or incontinence  NEUROLOGICAL: No headaches, memory loss, loss of strength, numbness, or tremors  SKIN: No itching, burning, rashes, or lesions   LYMPH NODES: No enlarged glands  ENDOCRINE: No heat or cold intolerance; No hair loss  MUSCULOSKELETAL: No joint pain or swelling; No muscle, back, or extremity pain  PSYCHIATRIC: No depression, anxiety, mood swings, or difficulty sleeping  HEME/LYMPH: No easy bruising, or bleeding gums  ALLERY AND IMMUNOLOGIC: No hives or eczema      Consultant(s) Notes Reviewed:  [ ] YES  [ ] NO    PHYSICAL EXAM:  GENERAL: NAD, well-groomed, well-developed  HEAD:  Atraumatic, Normocephalic  EYES: EOMI, PERRLA, conjunctiva and sclera clear  ENMT: No tonsillar erythema, exudates, or enlargement; Moist mucous membranes, Good dentition, No lesions  NECK: Supple, No JVD, Normal thyroid  NERVOUS SYSTEM:  Alert & Oriented X3, Good concentration; Motor Strength 5/5 B/L upper and lower extremities; DTRs 2+ intact and symmetric  CHEST/LUNG: Clear to percussion bilaterally; No rales, rhonchi, wheezing, or rubs  HEART: Regular rate and rhythm; No murmurs, rubs, or gallops  ABDOMEN: Soft, Nontender, Nondistended; Bowel sounds present  EXTREMITIES:  2+ Peripheral Pulses, No clubbing, cyanosis, or edema  LYMPH: No lymphadenopathy noted  SKIN: No rashes or lesions    Care Discussed with Consultants/Other Providers [ x] YES  [ ] NO
CHIEF COMPLAINT:Patient is a 52y old  Female who presents with a chief complaint of B/l foot ulcer/ Left ankle swelling .Pt appears comfortable.    	  REVIEW OF SYSTEMS:  CONSTITUTIONAL: No fever, weight loss, or fatigue  EYES: No eye pain, visual disturbances, or discharge  ENT:  No difficulty hearing, tinnitus, vertigo; No sinus or throat pain  NECK: No pain or stiffness  RESPIRATORY: No cough, wheezing, chills or hemoptysis; No Shortness of Breath  CARDIOVASCULAR: No chest pain, palpitations, passing out, dizziness, or leg swelling  GASTROINTESTINAL: No abdominal or epigastric pain. No nausea, vomiting, or hematemesis; No diarrhea or constipation. No melena or hematochezia.  GENITOURINARY: No dysuria, frequency, hematuria, or incontinence  NEUROLOGICAL: No headaches, memory loss, loss of strength, numbness, or tremors  SKIN: No itching, burning, rashes, or lesions   LYMPH Nodes: No enlarged glands  ENDOCRINE: No heat or cold intolerance; No hair loss  MUSCULOSKELETAL: No joint pain or swelling; No muscle, back, or extremity pain  PSYCHIATRIC: No depression, anxiety, mood swings, or difficulty sleeping  HEME/LYMPH: No easy bruising, or bleeding gums  ALLERGY AND IMMUNOLOGIC: No hives or eczema	      PHYSICAL EXAM:  T(C): 36.5 (02-16-18 @ 05:13), Max: 36.9 (02-15-18 @ 20:31)  HR: 56 (02-16-18 @ 05:13) (56 - 77)  BP: 139/70 (02-16-18 @ 05:13) (128/75 - 139/70)  RR: 16 (02-16-18 @ 05:13) (16 - 18)  SpO2: 95% (02-16-18 @ 05:13) (95% - 97%)    I&O's Summary    15 Feb 2018 07:01  -  16 Feb 2018 07:00  --------------------------------------------------------  IN: 450 mL / OUT: 0 mL / NET: 450 mL        Appearance: Normal	  HEENT:   Normal oral mucosa, PERRL, EOMI	  Lymphatic: No lymphadenopathy  Cardiovascular: Normal S1 S2, No JVD, No murmurs, No edema  Respiratory: Lungs clear to auscultation	  Psychiatry: A & O x 3, Mood & affect appropriate  Gastrointestinal:  Soft, Non-tender, + BS	  Skin: No rashes, No ecchymoses, No cyanosis	  Neurologic: Non-focal  Extremities: Normal range of motion, No clubbing, cyanosis or edema  Vascular: Peripheral pulses palpable 2+ bilaterally    MEDICATIONS  (STANDING):  aspirin  chewable 81 milliGRAM(s) Oral daily  collagenase Ointment 1 Application(s) Topical once  enoxaparin Injectable 40 milliGRAM(s) SubCutaneous daily  fluconAZOLE   Tablet 200 milliGRAM(s) Oral daily  gemfibrozil 600 milliGRAM(s) Oral two times a day before meals  insulin glargine Injectable (LANTUS) 40 Unit(s) SubCutaneous at bedtime  insulin lispro (HumaLOG) corrective regimen sliding scale   SubCutaneous Before meals and at bedtime  insulin lispro Injectable (HumaLOG) 15 Unit(s) SubCutaneous three times a day before meals  lisinopril 5 milliGRAM(s) Oral daily  piperacillin/tazobactam IVPB. 3.375 Gram(s) IV Intermittent every 8 hours  sertraline 100 milliGRAM(s) Oral daily  simvastatin 10 milliGRAM(s) Oral at bedtime  vancomycin  IVPB 1000 milliGRAM(s) IV Intermittent every 12 hours  vancomycin  IVPB            	  LABS:	 	                       12.9   8.4   )-----------( 215      ( 16 Feb 2018 06:21 )             38.8     02-16    141  |  104  |  11  ----------------------------<  229<H>  3.9   |  30  |  0.79    Ca    8.9      16 Feb 2018 06:21  Phos  3.5     02-16  Mg     1.9     02-16    TPro  6.7  /  Alb  3.1<L>  /  TBili  1.1  /  DBili  x   /  AST  14  /  ALT  21  /  AlkPhos  88  02-16      Lipid Profile: Cholesterol 134  LDL 60  HDL 35      HgA1c: Hemoglobin A1C, Whole Blood: 11.3 % (02-15 @ 09:54)    TSH: Thyroid Stimulating Hormone, Serum: 1.42 uU/mL (02-14 @ 10:48)
CHIEF COMPLAINT:Patient is a 52y old  Female who presents with a chief complaint of B/l foot ulcer/ Left ankle swelling .Pt appears comfortable.    	  REVIEW OF SYSTEMS:  CONSTITUTIONAL: No fever, weight loss, or fatigue  EYES: No eye pain, visual disturbances, or discharge  ENT:  No difficulty hearing, tinnitus, vertigo; No sinus or throat pain  NECK: No pain or stiffness  RESPIRATORY: No cough, wheezing, chills or hemoptysis; No Shortness of Breath  CARDIOVASCULAR: No chest pain, palpitations, passing out, dizziness, or leg swelling  GASTROINTESTINAL: No abdominal or epigastric pain. No nausea, vomiting, or hematemesis; No diarrhea or constipation. No melena or hematochezia.  GENITOURINARY: No dysuria, frequency, hematuria, or incontinence  NEUROLOGICAL: No headaches, memory loss, loss of strength, numbness, or tremors  SKIN: No itching, burning, rashes, or lesions   LYMPH Nodes: No enlarged glands  ENDOCRINE: No heat or cold intolerance; No hair loss  MUSCULOSKELETAL: No joint pain or swelling; No muscle, back, or extremity pain  PSYCHIATRIC: No depression, anxiety, mood swings, or difficulty sleeping  HEME/LYMPH: No easy bruising, or bleeding gums  ALLERGY AND IMMUNOLOGIC: No hives or eczema	      PHYSICAL EXAM:  T(C): 36.7 (02-21-18 @ 05:25), Max: 37.1 (02-20-18 @ 20:43)  HR: 68 (02-21-18 @ 05:25) (68 - 71)  BP: 116/69 (02-21-18 @ 05:25) (112/72 - 116/69)  RR: 15 (02-21-18 @ 05:25) (15 - 16)  SpO2: 98% (02-21-18 @ 05:25) (97% - 98%)    I&O's Summary    20 Feb 2018 07:01  -  21 Feb 2018 07:00  --------------------------------------------------------  IN: 300 mL / OUT: 0 mL / NET: 300 mL        Appearance: Normal	  HEENT:   Normal oral mucosa, PERRL, EOMI	  Lymphatic: No lymphadenopathy  Cardiovascular: Normal S1 S2, No JVD, No murmurs, No edema  Respiratory: Lungs clear to auscultation	  Psychiatry: A & O x 3, Mood & affect appropriate  Gastrointestinal:  Soft, Non-tender, + BS	  Skin: No rashes, No ecchymoses, No cyanosis	  Neurologic: Non-focal  Extremities: Normal range of motion, No clubbing, cyanosis or edema  Vascular: Peripheral pulses palpable 2+ bilaterally    MEDICATIONS  (STANDING):  aspirin  chewable 81 milliGRAM(s) Oral daily  collagenase Ointment 1 Application(s) Topical once  enoxaparin Injectable 40 milliGRAM(s) SubCutaneous daily  fluconAZOLE   Tablet 200 milliGRAM(s) Oral daily  gemfibrozil 600 milliGRAM(s) Oral two times a day before meals  insulin glargine Injectable (LANTUS) 46 Unit(s) SubCutaneous at bedtime  insulin lispro (HumaLOG) corrective regimen sliding scale   SubCutaneous Before meals and at bedtime  insulin lispro Injectable (HumaLOG) 15 Unit(s) SubCutaneous three times a day before meals  lisinopril 2.5 milliGRAM(s) Oral daily  piperacillin/tazobactam IVPB. 3.375 Gram(s) IV Intermittent every 8 hours  sertraline 100 milliGRAM(s) Oral daily  simvastatin 10 milliGRAM(s) Oral at bedtime  sodium chloride 0.9% lock flush 20 milliLiter(s) IV Push once  vancomycin  IVPB 1250 milliGRAM(s) IV Intermittent every 12 hours      	  LABS:	 	                      14.4   9.6   )-----------( 239      ( 20 Feb 2018 07:18 )             43.7     02-20    139  |  103  |  18  ----------------------------<  173<H>  4.6   |  29  |  1.00    Ca    9.5      20 Feb 2018 07:18        Lipid Profile: Cholesterol 134  LDL 60  HDL 35      HgA1c: Hemoglobin A1C, Whole Blood: 11.3 % (02-15 @ 09:54)    TSH: Thyroid Stimulating Hormone, Serum: 1.42 uU/mL (02-14 @ 10:48)
CHIEF COMPLAINT:Patient is a 52y old  Female who presents with a chief complaint of B/l foot ulcer/ Left ankle swelling .Pt appears comfortable.    	  REVIEW OF SYSTEMS:  CONSTITUTIONAL: No fever, weight loss, or fatigue  EYES: No eye pain, visual disturbances, or discharge  ENT:  No difficulty hearing, tinnitus, vertigo; No sinus or throat pain  NECK: No pain or stiffness  RESPIRATORY: No cough, wheezing, chills or hemoptysis; No Shortness of Breath  CARDIOVASCULAR: No chest pain, palpitations, passing out, dizziness, or leg swelling  GASTROINTESTINAL: No abdominal or epigastric pain. No nausea, vomiting, or hematemesis; No diarrhea or constipation. No melena or hematochezia.  GENITOURINARY: No dysuria, frequency, hematuria, or incontinence  NEUROLOGICAL: No headaches, memory loss, loss of strength, numbness, or tremors  SKIN: No itching, burning, rashes, or lesions   LYMPH Nodes: No enlarged glands  ENDOCRINE: No heat or cold intolerance; No hair loss  MUSCULOSKELETAL: No joint pain or swelling; No muscle, back, or extremity pain  PSYCHIATRIC: No depression, anxiety, mood swings, or difficulty sleeping  HEME/LYMPH: No easy bruising, or bleeding gums  ALLERGY AND IMMUNOLOGIC: No hives or eczema	    PHYSICAL EXAM:  T(C): 36.6 (02-15-18 @ 04:55), Max: 36.9 (02-14-18 @ 16:16)  HR: 59 (02-15-18 @ 04:55) (59 - 74)  BP: 120/68 (02-15-18 @ 04:55) (120/68 - 148/80)  RR: 18 (02-15-18 @ 04:55) (16 - 18)  SpO2: 98% (02-15-18 @ 04:55) (97% - 98%)    Appearance: Normal	  HEENT:   Normal oral mucosa, PERRL, EOMI	  Lymphatic: No lymphadenopathy  Cardiovascular: Normal S1 S2, No JVD, No murmurs, +1 edema  Respiratory: Lungs clear to auscultation	  Psychiatry: A & O x 3, Mood & affect appropriate  Gastrointestinal:  Soft, Non-tender, + BS	  Skin: No rashes, No ecchymoses, No cyanosis	  Neurologic: Non-focal  Extremities: Normal range of motion, No clubbing, cyanosis+1 edema  Vascular: Peripheral pulses palpable 2+ bilaterally    MEDICATIONS  (STANDING):  aspirin  chewable 81 milliGRAM(s) Oral daily  enoxaparin Injectable 40 milliGRAM(s) SubCutaneous daily  fluconAZOLE   Tablet 200 milliGRAM(s) Oral daily  gemfibrozil 600 milliGRAM(s) Oral two times a day before meals  insulin glargine Injectable (LANTUS) 30 Unit(s) SubCutaneous at bedtime  insulin lispro (HumaLOG) corrective regimen sliding scale   SubCutaneous Before meals and at bedtime  insulin lispro Injectable (HumaLOG) 15 Unit(s) SubCutaneous three times a day before meals  lisinopril 5 milliGRAM(s) Oral daily  piperacillin/tazobactam IVPB. 3.375 Gram(s) IV Intermittent every 8 hours  sertraline 100 milliGRAM(s) Oral daily  simvastatin 10 milliGRAM(s) Oral at bedtime      	  LABS:	 	                       13.2   8.6   )-----------( 218      ( 15 Feb 2018 07:27 )             40.6     02-15    139  |  104  |  12  ----------------------------<  238<H>  4.3   |  30  |  0.76    Ca    9.0      15 Feb 2018 07:27  Phos  3.3     02-15  Mg     1.8     02-15    TPro  7.9  /  Alb  3.5  /  TBili  1.0  /  DBili  x   /  AST  17  /  ALT  24  /  AlkPhos  130<H>  02-14      Lipid Profile: Cholesterol 134  LDL 60  HDL 35      HgA1c: Hemoglobin A1C, Whole Blood: 11.3 % (02-15 @ 09:54)    TSH: Thyroid Stimulating Hormone, Serum: 1.42 uU/mL (02-14 @ 10:48)      Impression:    Acute osteomyelitis of the distal phalanx of the fourth toe with   surrounding cellulitis.    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   Evidence of prior fracture along the plantar aspect of the third   metatarsal.  	    Impression:    Acute osteomyelitis at the distal phalanx of the fourth toe with   surrounding cellulitis.    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    Flexor hallucis longus tenosynovitis.
CHIEF COMPLAINT:Patient is a 52y old  Female who presents with a chief complaint of B/l foot ulcer/ Left ankle swelling. Pt appears comfortable.    	  REVIEW OF SYSTEMS:  CONSTITUTIONAL: No fever, weight loss, or fatigue  EYES: No eye pain, visual disturbances, or discharge  ENT:  No difficulty hearing, tinnitus, vertigo; No sinus or throat pain  NECK: No pain or stiffness  RESPIRATORY: No cough, wheezing, chills or hemoptysis; No Shortness of Breath  CARDIOVASCULAR: No chest pain, palpitations, passing out, dizziness, or leg swelling  GASTROINTESTINAL: No abdominal or epigastric pain. No nausea, vomiting, or hematemesis; No diarrhea or constipation. No melena or hematochezia.  GENITOURINARY: No dysuria, frequency, hematuria, or incontinence  NEUROLOGICAL: No headaches, memory loss, loss of strength, numbness, or tremors  SKIN: No itching, burning, rashes, or lesions   LYMPH Nodes: No enlarged glands  ENDOCRINE: No heat or cold intolerance; No hair loss  MUSCULOSKELETAL: No joint pain or swelling; No muscle, back, or extremity pain  PSYCHIATRIC: No depression, anxiety, mood swings, or difficulty sleeping  HEME/LYMPH: No easy bruising, or bleeding gums  ALLERGY AND IMMUNOLOGIC: No hives or eczema	    PHYSICAL EXAM:  T(C): 36.8 (02-20-18 @ 05:43), Max: 36.9 (02-19-18 @ 13:49)  HR: 66 (02-20-18 @ 05:43) (66 - 71)  BP: 101/57 (02-20-18 @ 05:43) (101/57 - 116/67)  RR: 16 (02-20-18 @ 05:43) (14 - 16)  SpO2: 97% (02-20-18 @ 05:43) (94% - 97%)      Appearance: Normal	  HEENT:   Normal oral mucosa, PERRL, EOMI	  Lymphatic: No lymphadenopathy  Cardiovascular: Normal S1 S2, No JVD, No murmurs, No edema  Respiratory: Lungs clear to auscultation	  Psychiatry: A & O x 3, Mood & affect appropriate  Gastrointestinal:  Soft, Non-tender, + BS	  Skin: No rashes, No ecchymoses, No cyanosis	  Neurologic: Non-focal  Extremities: Normal range of motion, No clubbing, cyanosis or edema  Vascular: Peripheral pulses palpable 2+ bilaterally    MEDICATIONS  (STANDING):  aspirin  chewable 81 milliGRAM(s) Oral daily  collagenase Ointment 1 Application(s) Topical once  enoxaparin Injectable 40 milliGRAM(s) SubCutaneous daily  fluconAZOLE   Tablet 200 milliGRAM(s) Oral daily  gemfibrozil 600 milliGRAM(s) Oral two times a day before meals  insulin glargine Injectable (LANTUS) 46 Unit(s) SubCutaneous at bedtime  insulin lispro (HumaLOG) corrective regimen sliding scale   SubCutaneous Before meals and at bedtime  insulin lispro Injectable (HumaLOG) 15 Unit(s) SubCutaneous three times a day before meals  lisinopril 2.5 milliGRAM(s) Oral daily  piperacillin/tazobactam IVPB. 3.375 Gram(s) IV Intermittent every 8 hours  sertraline 100 milliGRAM(s) Oral daily  simvastatin 10 milliGRAM(s) Oral at bedtime  sodium chloride 0.9% lock flush 20 milliLiter(s) IV Push once  vancomycin  IVPB 1250 milliGRAM(s) IV Intermittent every 12 hours      	  LABS:	 	                        14.4   9.6   )-----------( 239      ( 20 Feb 2018 07:18 )             43.7     02-20    139  |  103  |  18  ----------------------------<  173<H>  4.6   |  29  |  1.00    Ca    9.5      20 Feb 2018 07:18        Lipid Profile: Cholesterol 134  LDL 60  HDL 35      HgA1c: Hemoglobin A1C, Whole Blood: 11.3 % (02-15 @ 09:54)    TSH: Thyroid Stimulating Hormone, Serum: 1.42 uU/mL (02-14 @ 10:48)
Interval Events:  pt feels well s/p PICC line    Allergies    iodinated radiocontrast agents (Hives)    Intolerances      Endocrine/Metabolic Medications:  gemfibrozil 600 milliGRAM(s) Oral two times a day before meals  insulin glargine Injectable (LANTUS) 46 Unit(s) SubCutaneous at bedtime  insulin lispro (HumaLOG) corrective regimen sliding scale   SubCutaneous Before meals and at bedtime  insulin lispro Injectable (HumaLOG) 15 Unit(s) SubCutaneous three times a day before meals  simvastatin 10 milliGRAM(s) Oral at bedtime      Vital Signs Last 24 Hrs  T(C): 36.7 (19 Feb 2018 05:00), Max: 36.8 (18 Feb 2018 20:50)  T(F): 98 (19 Feb 2018 05:00), Max: 98.3 (18 Feb 2018 20:50)  HR: 64 (19 Feb 2018 05:00) (64 - 78)  BP: 124/79 (19 Feb 2018 05:00) (107/69 - 124/79)  BP(mean): --  RR: 16 (19 Feb 2018 05:00) (14 - 16)  SpO2: 98% (19 Feb 2018 05:00) (95% - 98%)      PHYSICAL EXAM  All physical exam findings normal, except those marked:  General:	Alert, active, cooperative, NAD, well hydrated  .		[] Abnormal:  Neck		Normal: supple, no cervical adenopathy, no palpable thyroid  .		[] Abnormal:  Cardiovascular	Normal: regular rate, normal S1, S2, no murmurs  .		[] Abnormal:  Respiratory	Normal: no chest wall deformity, normal respiratory pattern, CTA B/L  .		[] Abnormal:  Abdominal	Normal: soft, ND, NT, bowel sounds present, no masses, no organomegaly  .		[] Abnormal:  		Normal normal genitalia, testes descended, circumcised/uncircumcised  .		Liz stage:			Breast liz:  .		Menstrual history:  .		[] Abnormal:  Extremities	Normal: FROM x4  .		[] Abnormal:  Skin		Normal: intact and not indurated, no rash, no acanthosis nigricans  .		[] Abnormal:  Neurologic	Normal: grossly intact  .		[] Abnormal:    LABS        CAPILLARY BLOOD GLUCOSE      POCT Blood Glucose.: 135 mg/dL (19 Feb 2018 07:53)  POCT Blood Glucose.: 168 mg/dL (18 Feb 2018 21:15)  POCT Blood Glucose.: 95 mg/dL (18 Feb 2018 16:16)        Assesment/plan  Dm- good control  cont Lantus 46 and humalog 15 ac tid  restart metformin upon d/c   fsg ac and hs  compliance d/w pt
Interval Events:  pt in nad      Allergies    iodinated radiocontrast agents (Hives)    Intolerances      Endocrine/Metabolic Medications:  gemfibrozil 600 milliGRAM(s) Oral two times a day before meals  insulin glargine Injectable (LANTUS) 30 Unit(s) SubCutaneous at bedtime  insulin lispro (HumaLOG) corrective regimen sliding scale   SubCutaneous Before meals and at bedtime  insulin lispro Injectable (HumaLOG) 15 Unit(s) SubCutaneous three times a day before meals  simvastatin 10 milliGRAM(s) Oral at bedtime      Vital Signs Last 24 Hrs  T(C): 36.8 (15 Feb 2018 14:03), Max: 36.8 (14 Feb 2018 21:03)  T(F): 98.3 (15 Feb 2018 14:03), Max: 98.3 (14 Feb 2018 21:03)  HR: 77 (15 Feb 2018 14:03) (59 - 77)  BP: 128/75 (15 Feb 2018 14:03) (120/68 - 148/80)  BP(mean): 98 (14 Feb 2018 21:46) (98 - 98)  RR: 18 (15 Feb 2018 14:03) (16 - 18)  SpO2: 96% (15 Feb 2018 14:03) (96% - 98%)      PHYSICAL EXAM  All physical exam findings normal, except those marked:  General:	Alert, active, cooperative, NAD, well hydrated  .		[] Abnormal:  Neck		Normal: supple, no cervical adenopathy, no palpable thyroid  .		[] Abnormal:  Cardiovascular	Normal: regular rate, normal S1, S2, no murmurs  .		[] Abnormal:  Respiratory	Normal: no chest wall deformity, normal respiratory pattern, CTA B/L  .		[] Abnormal:  Abdominal	Normal: soft, ND, NT, bowel sounds present, no masses, no organomegaly  .		[] Abnormal:  		Normal normal genitalia, testes descended, circumcised/uncircumcised  .		Liz stage:			Breast liz:  .		Menstrual history:  .		[] Abnormal:  Extremities	Normal: FROM x4  .		[] Abnormal:  Skin		Normal: intact and not indurated, no rash, no acanthosis nigricans  .		[] Abnormal:  Neurologic	Normal: grossly intact  .		[] Abnormal:    LABS                        13.2   8.6   )-----------( 218      ( 15 Feb 2018 07:27 )             40.6                               139    |  104    |  12                  Calcium: 9.0   / iCa: x      (02-15 @ 07:27)    ----------------------------<  238       Magnesium: 1.8                              4.3     |  30     |  0.76             Phosphorous: 3.3        CAPILLARY BLOOD GLUCOSE      POCT Blood Glucose.: 162 mg/dL (15 Feb 2018 16:31)  POCT Blood Glucose.: 305 mg/dL (15 Feb 2018 11:32)  POCT Blood Glucose.: 239 mg/dL (15 Feb 2018 07:43)  POCT Blood Glucose.: 223 mg/dL (14 Feb 2018 21:08)        Assesment/plan    Dm- uncont   change lantus to 40 units  cont humalog 15 ac tid   fsg ac and hs  tight control emphasized  restart metformin upon d/c
Interval Events:  pt in nad    Allergies    iodinated radiocontrast agents (Hives)    Intolerances      Endocrine/Metabolic Medications:  gemfibrozil 600 milliGRAM(s) Oral two times a day before meals  insulin glargine Injectable (LANTUS) 40 Unit(s) SubCutaneous at bedtime  insulin lispro (HumaLOG) corrective regimen sliding scale   SubCutaneous Before meals and at bedtime  insulin lispro Injectable (HumaLOG) 15 Unit(s) SubCutaneous three times a day before meals  simvastatin 10 milliGRAM(s) Oral at bedtime      Vital Signs Last 24 Hrs  T(C): 36.5 (16 Feb 2018 05:13), Max: 36.9 (15 Feb 2018 20:31)  T(F): 97.7 (16 Feb 2018 05:13), Max: 98.5 (15 Feb 2018 20:31)  HR: 56 (16 Feb 2018 05:13) (56 - 77)  BP: 139/70 (16 Feb 2018 05:13) (128/75 - 139/70)  BP(mean): 89 (15 Feb 2018 20:31) (89 - 89)  RR: 16 (16 Feb 2018 05:13) (16 - 18)  SpO2: 95% (16 Feb 2018 05:13) (95% - 97%)      PHYSICAL EXAM  All physical exam findings normal, except those marked:  General:	Alert, active, cooperative, NAD, well hydrated  .		[] Abnormal:  Neck		Normal: supple, no cervical adenopathy, no palpable thyroid  .		[] Abnormal:  Cardiovascular	Normal: regular rate, normal S1, S2, no murmurs  .		[] Abnormal:  Respiratory	Normal: no chest wall deformity, normal respiratory pattern, CTA B/L  .		[] Abnormal:  Abdominal	Normal: soft, ND, NT, bowel sounds present, no masses, no organomegaly  .		[] Abnormal:  		Normal normal genitalia, testes descended, circumcised/uncircumcised  .		Liz stage:			Breast liz:  .		Menstrual history:  .		[] Abnormal:  Extremities	Normal: FROM x4  .		[] Abnormal:  Skin		Normal: intact and not indurated, no rash, no acanthosis nigricans  .		[] Abnormal:  Neurologic	Normal: grossly intact  .		[] Abnormal:    LABS                        12.9   8.4   )-----------( 215      ( 16 Feb 2018 06:21 )             38.8                               141    |  104    |  11                  Calcium: 8.9   / iCa: x      (02-16 @ 06:21)    ----------------------------<  229       Magnesium: 1.9                              3.9     |  30     |  0.79             Phosphorous: 3.5      TPro  6.7    /  Alb  3.1    /  TBili  1.1    /  DBili  x      /  AST  14     /  ALT  21     /  AlkPhos  88     16 Feb 2018 06:21    CAPILLARY BLOOD GLUCOSE      POCT Blood Glucose.: 244 mg/dL (16 Feb 2018 11:31)  POCT Blood Glucose.: 241 mg/dL (16 Feb 2018 07:29)  POCT Blood Glucose.: 191 mg/dL (15 Feb 2018 21:41)  POCT Blood Glucose.: 162 mg/dL (15 Feb 2018 16:31)        Assesment/plan      Dm- uncont   change lantus to 46 units  cont humalog 15 ac tid   fsg ac and hs  tight control emphasized  restart metformin upon d/c
Interval Events:  pt in nad    Allergies    iodinated radiocontrast agents (Hives)    Intolerances      Endocrine/Metabolic Medications:  gemfibrozil 600 milliGRAM(s) Oral two times a day before meals  insulin glargine Injectable (LANTUS) 46 Unit(s) SubCutaneous at bedtime  insulin lispro (HumaLOG) corrective regimen sliding scale   SubCutaneous Before meals and at bedtime  insulin lispro Injectable (HumaLOG) 15 Unit(s) SubCutaneous three times a day before meals  simvastatin 10 milliGRAM(s) Oral at bedtime      Vital Signs Last 24 Hrs  T(C): 36.8 (20 Feb 2018 05:43), Max: 36.9 (19 Feb 2018 13:49)  T(F): 98.3 (20 Feb 2018 05:43), Max: 98.4 (19 Feb 2018 13:49)  HR: 66 (20 Feb 2018 05:43) (66 - 71)  BP: 101/57 (20 Feb 2018 05:43) (101/57 - 116/67)  BP(mean): 78 (19 Feb 2018 20:28) (78 - 78)  RR: 16 (20 Feb 2018 05:43) (14 - 16)  SpO2: 97% (20 Feb 2018 05:43) (94% - 97%)      PHYSICAL EXAM  All physical exam findings normal, except those marked:  General:	Alert, active, cooperative, NAD, well hydrated  .		[] Abnormal:  Neck		Normal: supple, no cervical adenopathy, no palpable thyroid  .		[] Abnormal:  Cardiovascular	Normal: regular rate, normal S1, S2, no murmurs  .		[] Abnormal:  Respiratory	Normal: no chest wall deformity, normal respiratory pattern, CTA B/L  .		[] Abnormal:  Abdominal	Normal: soft, ND, NT, bowel sounds present, no masses, no organomegaly  .		[] Abnormal:  		Normal normal genitalia, testes descended, circumcised/uncircumcised  .		Liz stage:			Breast liz:  .		Menstrual history:  .		[] Abnormal:  Extremities	Normal: FROM x4  .		[] Abnormal:  Skin		Normal: intact and not indurated, no rash, no acanthosis nigricans  .		[] Abnormal:  Neurologic	Normal: grossly intact  .		[] Abnormal:    LABS                        14.4   9.6   )-----------( 239      ( 20 Feb 2018 07:18 )             43.7                               139    |  103    |  18                  Calcium: 9.5   / iCa: x      (02-20 @ 07:18)    ----------------------------<  173       Magnesium: x                                4.6     |  29     |  1.00             Phosphorous: x          CAPILLARY BLOOD GLUCOSE      POCT Blood Glucose.: 139 mg/dL (20 Feb 2018 08:20)  POCT Blood Glucose.: 125 mg/dL (20 Feb 2018 05:39)  POCT Blood Glucose.: 92 mg/dL (19 Feb 2018 21:44)  POCT Blood Glucose.: 105 mg/dL (19 Feb 2018 16:35)  POCT Blood Glucose.: 139 mg/dL (19 Feb 2018 11:31)        Assesment/plan  Dm- good control  cont Lantus 46 and humalog 15 ac tid  restart metformin upon d/c   fsg ac and hs  compliance d/w pt
Meds:  fluconAZOLE   Tablet 200 milliGRAM(s) Oral daily  piperacillin/tazobactam IVPB. 3.375 Gram(s) IV Intermittent every 8 hours.  Vancomycin 1 gm IVPB q 12 hours.    Allergies:  Allergies    iodinated radiocontrast agents (Hives)    Intolerances      ROS  [  ] UNABLE TO ELICIT    General:  [  ] None  [  ] Fever  [  ] Chills  [ x ] Malaise    Skin:  [  ] None [  ] Rash  [  ] Wound  [ x ] Foot Ulcer    HEENT:  [ x ] None  [  ] Sore Throat  [  ] Nasal congestion/ runny nose  [  ] Photophobia [  ] Neck pain      Chest:  [ x ] None   [  ] SOB  [  ] Cough  [  ] None    Cardiovascular:   [ x ] None  [  ] CP  [  ] Palpitation    Gastrointestinal:  [ x ] None  [  ] Abd pain   [  ] Nausea    [  ] Vomiting   [  ] Diarrhea	     Genitourinary:  [ x ] None [  ] Polyuria   [  ] Urgency  [  ] Frequency  [  ] Dysuria    [  ]  Hematuria       Musculoskeletal:  [  ] None [  ] Back Pain	[  ] Body aches  [ x ] Right 4th toe wound and right foot redness and swelling.    Neurological: [  ] None [  ]Dizziness  [  ]Visual Disturbance  [  ]Headaches   [ x ] Weakness          PHYSICAL EXAM:  Vital Signs Last 24 Hrs  T(C): 36.7 (19 Feb 2018 05:00), Max: 36.8 (18 Feb 2018 20:50)  T(F): 98 (19 Feb 2018 05:00), Max: 98.3 (18 Feb 2018 20:50)  HR: 64 (19 Feb 2018 05:00) (64 - 78)  BP: 124/79 (19 Feb 2018 05:00) (107/69 - 124/79)  BP(mean): --  RR: 16 (19 Feb 2018 05:00) (14 - 16)  SpO2: 98% (19 Feb 2018 05:00) (95% - 98%)    Constitutional:    HEENT: [ x ] Wnl  [  ] Pharyngeal congestion    Neck:  [ x ] Supple  [  ]Lymphadenopathy  [  ] No JVD   [  ] JVD  [  ] Masses   [  ] WNL    CHEST/Respiratory:  [ x ]Clear to auscultation  [  ] Rales   [  ] Rhonchi   [  ] Wheezing     [  ] Chest Tenderness      Cardiovascular:  [ x ] Reg S1 S2   [  ] Irreg S1 S2   [ x ]No Murmur  [  ] +ve Murmurs  [  ]Systolic [  ]Diastolic      Abdomen:  [ x ] Soft  [ x ] No tendrerness  [  ] Tenderness  [  ] Organomegaly  [  ] ABD Distention  [  ] Rigidity                       [ x ] No Regidity                       [ x ] No Rebound Tenderness  [  ] No Guarding Rigidity  [  ] Rebound Tenderness[  ] Guarding Rigidity                          [ x ]  +ve Bowel Sounds  [  ] Decreased Bowel Sounds    [  ] Absent Bowel Sounds                            Extremities: [  ] No edema [ x ] Edema right foot [  ] Clubbing   [  ] Cyanosis                         [ x ] No Tender Calf muscles  [  ] Tender Calf muscles                        [ x ] Palpable peripheral pulses  [ x ] Right 4th toe wound and right foot redness and swelling, +VE resolving tenia pedis with tissue infection. +VE dry scab Left 4th toe.    Neurological: [ x ] Awake  [ x ] Alert  [ x ] Oriented  x  3                           [  ] Confused  [  ] Drowzy  [  ] repond to painful stimuli  [  ] Unresponsive    Skin:  [  ] Intact [  ] Redness [  ] Thrombophlebitis  [  ] Rashes  [  ] Dry  [ x ] Ulcers/ wounds both 4th toes.    Ortho:  [  ] Joint Swelling  [  ] Joint erythema [  ] Joint tenderness                [  ] Increased temp. to touch  [ x ] DJD [  ] WNL            LABS/DIAGNOSTIC TESTS                        12.9   8.4   )-----------( 215      ( 16 Feb 2018 06:21 )             38.8   02-16    141  |  104  |  11  ----------------------------<  229<H>  3.9   |  30  |  0.79    Ca    8.9      16 Feb 2018 06:21  Phos  3.5     02-16  Mg     1.9     02-16    TPro  6.7  /  Alb  3.1<L>  /  TBili  1.1  /  DBili  x   /  AST  14  /  ALT  21  /  AlkPhos  88  02-16    Vancomycin Level, Trough (02.18.18 @ 19:41)    Vancomycin Level, Trough: 8.5: Vancomycin trough levels should be rapidly reached and maintained at  15-20 ug/ml for life threatening MRSA  infections such as sepsis, endocarditis, osteomyelitis and pneumonia. A  first trough level should be drawn  before the 3rd or 4th dose.  Risk of renal toxicity is increased for levels >15 ug/ml, in patients on  other nephrotoxic drugs, who are  hemodynamically unstable, have unstable renal function, or are on  Vancomycin therapy for >14 days. Renal function with  creatinine levels should be monitored for those patients. ug/mL          CULTURES:   Culture - Blood (02.14.18 @ 09:49)    Specimen Source: .Blood Blood-Peripheral    Culture Results:   No growth to date.    Culture - Blood (02.14.18 @ 09:49)    Specimen Source: .Blood Blood-Peripheral    Culture Results:   No growth to date.      EXAM:  MR FOOT RT                            PROCEDURE DATE:  02/15/2018          INTERPRETATION:  History: History of type 2 diabetes. Pain.    Multiplanar multisequence noncontrast MRI of the right foot was performed   from the level of the toes to the level of the talonavicular joint.    Correlation is made with prior radiographs from February 14, 2018.    Findings:    There is cutaneous ulceration at the tip of the fourth toe with   surrounding soft tissue edema and skin thickening consistent with   cellulitis. There is associated osseous edema and hypointense T1 marrow   signal within the distal phalanx of the fourth toe consistent with acute   osteomyelitis.    There is extensive osseous edema centered about the navicular cuneiform   and second through fifth tarsometatarsal articulations with joint space   narrowing and areas of subchondral cystic change. Osseous productive   change and osseous fragmentation is noted at the second and third   tarsometatarsal articulations. These findings are most suggestive of   neuropathic osteoarthropathy. Transversely oriented hypointense line is   noted at the base of the third metatarsal without surrounding osseous   edema likely related to the sequela of a chronic fracture at this site.    There is moderate hallux valgus with moderate first metatarsophalangeal   and hallux sesamoid joint arthrosis. Overlying soft tissue and osseous   bunion is demonstrated. Hammertoe deformities of the first through fourth   toes is demonstrated. Lisfranc ligament appears grossly intact.    There is fatty atrophy and edema within the plantar muscles suggestive of   denervation related changes.    Impression:    Acute osteomyelitis of the distal phalanx of the fourth toe with   surrounding cellulitis.    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   Evidence of prior fracture along the plantar aspect of the third   metatarsal.          Assessment and Recommendation:   52 yr old F from home, lives with son, ambulates independently with former smoker, PMH of IDDM, HTN, HLD, peripheral neuropathy, chronic back and leg pain came with complain b/l foot ulcer with erythema of foot with left ankle swelling  for one day.  Patient was admitted for cellulitis of the right foot and was started on IV Zosyn.  MRI showed Acute osteomyelitis of the distal phalanx of the fourth toe with surrounding cellulitis.      Problem/Recommendation - 1:  Problem: Cellulitis of toe of right foot.   Recommendation:   1- UA & CS.  2- Follow Blood culture to final report.  3- Diflucan 200 mg po q day x 2 weeks.  4- Continue IV Zosyn, and Vancomycin, but increase the dose to 1.25 gm IVPB q 12 hours ( as repeat vancomycin trough is less than 10 ) and continue to follow trough closely.  5- Fluid and electrolytes management.  6- CBC and BMP follow up.   7- MRI was +VE for osteomyelitis.  8- Continue IV ABX for total 6 weeks.  9- weekly ESR and CRP x 6 weeks for follow up.    Problem/Recommendation - 2:  ·  Problem: Uncontrolled diabetes mellitus.    Recommendation:   1- Blood sugar monitoring and control.  2- Accu-Cheks with coverage.  3- 1800 tana ADA diet.  4- Follow HB A1C.     Problem/Recommendation - 3:  ·  Problem: HTN (hypertension).    Recommendation:   1- Monitor Blood pressure closely.  2- Blood pressure control.  3- BP. meds as per cardiology and primary care team.       Discussed with NP covering for this patient, and with Patient.
Meds:  fluconAZOLE   Tablet 200 milliGRAM(s) Oral daily  piperacillin/tazobactam IVPB. 3.375 Gram(s) IV Intermittent every 8 hours.  Vancomycin 1 gm IVPB q 12 hours.    Allergies:  Allergies    iodinated radiocontrast agents (Hives)    Intolerances      ROS  [  ] UNABLE TO ELICIT    General:  [  ] None  [  ] Fever  [  ] Chills  [ x ] Malaise    Skin:  [  ] None [  ] Rash  [  ] Wound  [ x ] Foot Ulcer    HEENT:  [ x ] None  [  ] Sore Throat  [  ] Nasal congestion/ runny nose  [  ] Photophobia [  ] Neck pain      Chest:  [ x ] None   [  ] SOB  [  ] Cough  [  ] None    Cardiovascular:   [ x ] None  [  ] CP  [  ] Palpitation    Gastrointestinal:  [ x ] None  [  ] Abd pain   [  ] Nausea    [  ] Vomiting   [  ] Diarrhea	     Genitourinary:  [ x ] None [  ] Polyuria   [  ] Urgency  [  ] Frequency  [  ] Dysuria    [  ]  Hematuria       Musculoskeletal:  [  ] None [  ] Back Pain	[  ] Body aches  [ x ] Right 4th toe wound and right foot redness and swelling.    Neurological: [  ] None [  ]Dizziness  [  ]Visual Disturbance  [  ]Headaches   [ x ] Weakness          PHYSICAL EXAM:  Vital Signs Last 24 Hrs  T(C): 36.7 (21 Feb 2018 05:25), Max: 37.1 (20 Feb 2018 20:43)  T(F): 98 (21 Feb 2018 05:25), Max: 98.8 (20 Feb 2018 20:43)  HR: 68 (21 Feb 2018 05:25) (68 - 71)  BP: 116/69 (21 Feb 2018 05:25) (112/72 - 116/69)  BP(mean): 81 (20 Feb 2018 20:43) (81 - 81)  RR: 15 (21 Feb 2018 05:25) (15 - 16)  SpO2: 98% (21 Feb 2018 05:25) (97% - 98%)    Constitutional:    HEENT: [ x ] Wnl  [  ] Pharyngeal congestion    Neck:  [ x ] Supple  [  ]Lymphadenopathy  [  ] No JVD   [  ] JVD  [  ] Masses   [  ] WNL    CHEST/Respiratory:  [ x ]Clear to auscultation  [  ] Rales   [  ] Rhonchi   [  ] Wheezing     [  ] Chest Tenderness      Cardiovascular:  [ x ] Reg S1 S2   [  ] Irreg S1 S2   [ x ]No Murmur  [  ] +ve Murmurs  [  ]Systolic [  ]Diastolic      Abdomen:  [ x ] Soft  [ x ] No tendrerness  [  ] Tenderness  [  ] Organomegaly  [  ] ABD Distention  [  ] Rigidity                       [ x ] No Regidity                       [ x ] No Rebound Tenderness  [  ] No Guarding Rigidity  [  ] Rebound Tenderness[  ] Guarding Rigidity                          [ x ]  +ve Bowel Sounds  [  ] Decreased Bowel Sounds    [  ] Absent Bowel Sounds                            Extremities: [  ] No edema [ x ] Edema right foot [  ] Clubbing   [  ] Cyanosis                         [ x ] No Tender Calf muscles  [  ] Tender Calf muscles                        [ x ] Palpable peripheral pulses  [ x ] Right 4th toe wound and right foot redness and resolving swelling, +VE resolving tenia pedis with tissue infection. +VE dry scab Left 4th toe.    Neurological: [ x ] Awake  [ x ] Alert  [ x ] Oriented  x  3                           [  ] Confused  [  ] Drowzy  [  ] repond to painful stimuli  [  ] Unresponsive    Skin:  [  ] Intact [  ] Redness [  ] Thrombophlebitis  [  ] Rashes  [  ] Dry  [ x ] Ulcers/ wounds both 4th toes.    Ortho:  [  ] Joint Swelling  [  ] Joint erythema [  ] Joint tenderness                [  ] Increased temp. to touch  [ x ] DJD [  ] WNL            LABS/DIAGNOSTIC TESTS                        12.9   8.4   )-----------( 215      ( 16 Feb 2018 06:21 )             38.8   02-16    141  |  104  |  11  ----------------------------<  229<H>  3.9   |  30  |  0.79    Ca    8.9      16 Feb 2018 06:21  Phos  3.5     02-16  Mg     1.9     02-16    TPro  6.7  /  Alb  3.1<L>  /  TBili  1.1  /  DBili  x   /  AST  14  /  ALT  21  /  AlkPhos  88  02-16    CAPILLARY BLOOD GLUCOSE      POCT Blood Glucose.: 126 mg/dL (21 Feb 2018 11:34)  POCT Blood Glucose.: 182 mg/dL (21 Feb 2018 07:51)  POCT Blood Glucose.: 211 mg/dL (20 Feb 2018 20:55)  POCT Blood Glucose.: 137 mg/dL (20 Feb 2018 16:20)      Vancomycin Level, Trough (02.20.18 @ 18:50)    Vancomycin Level, Trough: 9.8: Vancomycin trough levels should be rapidly reached and maintained at  15-20 ug/ml for life threatening MRSA  infections such as sepsis, endocarditis, osteomyelitis and pneumonia. A  first trough level should be drawn  before the 3rd or 4th dose.  Risk of renal toxicity is increased for levels >15 ug/ml, in patients on  other nephrotoxic drugs, who are  hemodynamically unstable, have unstable renal function, or are on  Vancomycin therapy for >14 days. Renal function with  creatinine levels should be monitored for those patients. ug/mL            CULTURES:   Culture - Blood (02.14.18 @ 09:49)    Specimen Source: .Blood Blood-Peripheral    Culture Results:   No growth to date.    Culture - Blood (02.14.18 @ 09:49)    Specimen Source: .Blood Blood-Peripheral    Culture Results:   No growth to date.      EXAM:  MR FOOT RT                            PROCEDURE DATE:  02/15/2018          INTERPRETATION:  History: History of type 2 diabetes. Pain.    Multiplanar multisequence noncontrast MRI of the right foot was performed   from the level of the toes to the level of the talonavicular joint.    Correlation is made with prior radiographs from February 14, 2018.    Findings:    There is cutaneous ulceration at the tip of the fourth toe with   surrounding soft tissue edema and skin thickening consistent with   cellulitis. There is associated osseous edema and hypointense T1 marrow   signal within the distal phalanx of the fourth toe consistent with acute   osteomyelitis.    There is extensive osseous edema centered about the navicular cuneiform   and second through fifth tarsometatarsal articulations with joint space   narrowing and areas of subchondral cystic change. Osseous productive   change and osseous fragmentation is noted at the second and third   tarsometatarsal articulations. These findings are most suggestive of   neuropathic osteoarthropathy. Transversely oriented hypointense line is   noted at the base of the third metatarsal without surrounding osseous   edema likely related to the sequela of a chronic fracture at this site.    There is moderate hallux valgus with moderate first metatarsophalangeal   and hallux sesamoid joint arthrosis. Overlying soft tissue and osseous   bunion is demonstrated. Hammertoe deformities of the first through fourth   toes is demonstrated. Lisfranc ligament appears grossly intact.    There is fatty atrophy and edema within the plantar muscles suggestive of   denervation related changes.    Impression:    Acute osteomyelitis of the distal phalanx of the fourth toe with   surrounding cellulitis.    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   Evidence of prior fracture along the plantar aspect of the third   metatarsal.          Assessment and Recommendation:   52 yr old F from home, lives with son, ambulates independently with former smoker, PMH of IDDM, HTN, HLD, peripheral neuropathy, chronic back and leg pain came with complain b/l foot ulcer with erythema of foot with left ankle swelling  for one day.  Patient was admitted for cellulitis of the right foot and was started on IV Zosyn.  MRI showed Acute osteomyelitis of the distal phalanx of the fourth toe with surrounding cellulitis.  2/19/18 Vancomycin dose was increased for low trough.      Problem/Recommendation - 1:  Problem: Cellulitis of toe of right foot.   Recommendation:   1- UA & CS.  2- Follow Blood culture to final report.  3- Diflucan 200 mg po q day x total 2 weeks.  4- Continue IV Zosyn, and Continue Vancomycin, 1.25 gm IVPB q 12 hours, and repeat trough again before changing the dose.  5- Fluid and electrolytes management.  6- CBC and BMP follow up.   7- MRI was +VE for osteomyelitis.  8- Continue IV ABX for total 6 weeks.  9- weekly Vancomycin, ESR, and CRP x 6 weeks for follow up.    Problem/Recommendation - 2:  ·  Problem: Uncontrolled diabetes mellitus.    Recommendation:   1- Blood sugar monitoring and control.  2- Accu-Cheks with coverage.  3- 1800 tana ADA diet.  4- Follow HB A1C.     Problem/Recommendation - 3:  ·  Problem: HTN (hypertension).    Recommendation:   1- Monitor Blood pressure closely.  2- Blood pressure control.  3- BP. meds as per cardiology and primary care team.       Discussed with NP covering for this patient, and with Patient.
Meds:  fluconAZOLE   Tablet 200 milliGRAM(s) Oral daily  piperacillin/tazobactam IVPB. 3.375 Gram(s) IV Intermittent every 8 hours.  Vancomycin 1 gm IVPB q 12 hours.    Allergies:  Allergies    iodinated radiocontrast agents (Hives)    Intolerances      ROS  [  ] UNABLE TO ELICIT    General:  [  ] None  [  ] Fever  [  ] Chills  [ x ] Malaise    Skin:  [  ] None [  ] Rash  [  ] Wound  [ x ] Foot Ulcer    HEENT:  [ x ] None  [  ] Sore Throat  [  ] Nasal congestion/ runny nose  [  ] Photophobia [  ] Neck pain      Chest:  [ x ] None   [  ] SOB  [  ] Cough  [  ] None    Cardiovascular:   [ x ] None  [  ] CP  [  ] Palpitation    Gastrointestinal:  [ x ] None  [  ] Abd pain   [  ] Nausea    [  ] Vomiting   [  ] Diarrhea	     Genitourinary:  [ x ] None [  ] Polyuria   [  ] Urgency  [  ] Frequency  [  ] Dysuria    [  ]  Hematuria       Musculoskeletal:  [  ] None [  ] Back Pain	[  ] Body aches  [ x ] Right 4th toe wound and right foot redness and swelling.    Neurological: [  ] None [  ]Dizziness  [  ]Visual Disturbance  [  ]Headaches   [ x ] Weakness          PHYSICAL EXAM:  Vital Signs Last 24 Hrs  T(C): 36.8 (20 Feb 2018 05:43), Max: 36.9 (19 Feb 2018 13:49)  T(F): 98.3 (20 Feb 2018 05:43), Max: 98.4 (19 Feb 2018 13:49)  HR: 66 (20 Feb 2018 05:43) (66 - 71)  BP: 101/57 (20 Feb 2018 05:43) (101/57 - 116/67)  BP(mean): 78 (19 Feb 2018 20:28) (78 - 78)  RR: 16 (20 Feb 2018 05:43) (14 - 16)  SpO2: 97% (20 Feb 2018 05:43) (94% - 97%)    Constitutional:    HEENT: [ x ] Wnl  [  ] Pharyngeal congestion    Neck:  [ x ] Supple  [  ]Lymphadenopathy  [  ] No JVD   [  ] JVD  [  ] Masses   [  ] WNL    CHEST/Respiratory:  [ x ]Clear to auscultation  [  ] Rales   [  ] Rhonchi   [  ] Wheezing     [  ] Chest Tenderness      Cardiovascular:  [ x ] Reg S1 S2   [  ] Irreg S1 S2   [ x ]No Murmur  [  ] +ve Murmurs  [  ]Systolic [  ]Diastolic      Abdomen:  [ x ] Soft  [ x ] No tendrerness  [  ] Tenderness  [  ] Organomegaly  [  ] ABD Distention  [  ] Rigidity                       [ x ] No Regidity                       [ x ] No Rebound Tenderness  [  ] No Guarding Rigidity  [  ] Rebound Tenderness[  ] Guarding Rigidity                          [ x ]  +ve Bowel Sounds  [  ] Decreased Bowel Sounds    [  ] Absent Bowel Sounds                            Extremities: [  ] No edema [ x ] Edema right foot [  ] Clubbing   [  ] Cyanosis                         [ x ] No Tender Calf muscles  [  ] Tender Calf muscles                        [ x ] Palpable peripheral pulses  [ x ] Right 4th toe wound and right foot redness and resolving swelling, +VE resolving tenia pedis with tissue infection. +VE dry scab Left 4th toe.    Neurological: [ x ] Awake  [ x ] Alert  [ x ] Oriented  x  3                           [  ] Confused  [  ] Drowzy  [  ] repond to painful stimuli  [  ] Unresponsive    Skin:  [  ] Intact [  ] Redness [  ] Thrombophlebitis  [  ] Rashes  [  ] Dry  [ x ] Ulcers/ wounds both 4th toes.    Ortho:  [  ] Joint Swelling  [  ] Joint erythema [  ] Joint tenderness                [  ] Increased temp. to touch  [ x ] DJD [  ] WNL            LABS/DIAGNOSTIC TESTS                        12.9   8.4   )-----------( 215      ( 16 Feb 2018 06:21 )             38.8   02-16    141  |  104  |  11  ----------------------------<  229<H>  3.9   |  30  |  0.79    Ca    8.9      16 Feb 2018 06:21  Phos  3.5     02-16  Mg     1.9     02-16    TPro  6.7  /  Alb  3.1<L>  /  TBili  1.1  /  DBili  x   /  AST  14  /  ALT  21  /  AlkPhos  88  02-16    CAPILLARY BLOOD GLUCOSE      POCT Blood Glucose.: 125 mg/dL (20 Feb 2018 05:39)  POCT Blood Glucose.: 92 mg/dL (19 Feb 2018 21:44)  POCT Blood Glucose.: 105 mg/dL (19 Feb 2018 16:35)  POCT Blood Glucose.: 139 mg/dL (19 Feb 2018 11:31)  POCT Blood Glucose.: 135 mg/dL (19 Feb 2018 07:53)      Vancomycin Level, Trough (02.18.18 @ 19:41)    Vancomycin Level, Trough: 8.5: Vancomycin trough levels should be rapidly reached and maintained at  15-20 ug/ml for life threatening MRSA  infections such as sepsis, endocarditis, osteomyelitis and pneumonia. A  first trough level should be drawn  before the 3rd or 4th dose.  Risk of renal toxicity is increased for levels >15 ug/ml, in patients on  other nephrotoxic drugs, who are  hemodynamically unstable, have unstable renal function, or are on  Vancomycin therapy for >14 days. Renal function with  creatinine levels should be monitored for those patients. ug/mL          CULTURES:   Culture - Blood (02.14.18 @ 09:49)    Specimen Source: .Blood Blood-Peripheral    Culture Results:   No growth to date.    Culture - Blood (02.14.18 @ 09:49)    Specimen Source: .Blood Blood-Peripheral    Culture Results:   No growth to date.      EXAM:  MR FOOT RT                            PROCEDURE DATE:  02/15/2018          INTERPRETATION:  History: History of type 2 diabetes. Pain.    Multiplanar multisequence noncontrast MRI of the right foot was performed   from the level of the toes to the level of the talonavicular joint.    Correlation is made with prior radiographs from February 14, 2018.    Findings:    There is cutaneous ulceration at the tip of the fourth toe with   surrounding soft tissue edema and skin thickening consistent with   cellulitis. There is associated osseous edema and hypointense T1 marrow   signal within the distal phalanx of the fourth toe consistent with acute   osteomyelitis.    There is extensive osseous edema centered about the navicular cuneiform   and second through fifth tarsometatarsal articulations with joint space   narrowing and areas of subchondral cystic change. Osseous productive   change and osseous fragmentation is noted at the second and third   tarsometatarsal articulations. These findings are most suggestive of   neuropathic osteoarthropathy. Transversely oriented hypointense line is   noted at the base of the third metatarsal without surrounding osseous   edema likely related to the sequela of a chronic fracture at this site.    There is moderate hallux valgus with moderate first metatarsophalangeal   and hallux sesamoid joint arthrosis. Overlying soft tissue and osseous   bunion is demonstrated. Hammertoe deformities of the first through fourth   toes is demonstrated. Lisfranc ligament appears grossly intact.    There is fatty atrophy and edema within the plantar muscles suggestive of   denervation related changes.    Impression:    Acute osteomyelitis of the distal phalanx of the fourth toe with   surrounding cellulitis.    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   Evidence of prior fracture along the plantar aspect of the third   metatarsal.          Assessment and Recommendation:   52 yr old F from home, lives with son, ambulates independently with former smoker, PMH of IDDM, HTN, HLD, peripheral neuropathy, chronic back and leg pain came with complain b/l foot ulcer with erythema of foot with left ankle swelling  for one day.  Patient was admitted for cellulitis of the right foot and was started on IV Zosyn.  MRI showed Acute osteomyelitis of the distal phalanx of the fourth toe with surrounding cellulitis.  2/19/18 Vancomycin dose was increased for low trough.      Problem/Recommendation - 1:  Problem: Cellulitis of toe of right foot.   Recommendation:   1- UA & CS.  2- Follow Blood culture to final report.  3- Diflucan 200 mg po q day x total 2 weeks.  4- Continue IV Zosyn, and Vancomycin, 1.25 gm IVPB q 12 hours, and continue to follow trough closely.  5- Fluid and electrolytes management.  6- CBC and BMP follow up.   7- MRI was +VE for osteomyelitis.  8- Continue IV ABX for total 6 weeks.  9- weekly ESR and CRP x 6 weeks for follow up.    Problem/Recommendation - 2:  ·  Problem: Uncontrolled diabetes mellitus.    Recommendation:   1- Blood sugar monitoring and control.  2- Accu-Cheks with coverage.  3- 1800 tana ADA diet.  4- Follow HB A1C.     Problem/Recommendation - 3:  ·  Problem: HTN (hypertension).    Recommendation:   1- Monitor Blood pressure closely.  2- Blood pressure control.  3- BP. meds as per cardiology and primary care team.       Discussed with NP covering for this patient, and with Patient.
Meds:  fluconAZOLE   Tablet 200 milliGRAM(s) Oral daily  piperacillin/tazobactam IVPB. 3.375 Gram(s) IV Intermittent every 8 hours.  Vancomycin 1 gm IVPB q 12 hours.    Allergies:  Allergies    iodinated radiocontrast agents (Hives)    Intolerances      ROS  [  ] UNABLE TO ELICIT    General:  [  ] None  [  ] Fever  [  ] Chills  [ x ] Malaise    Skin:  [  ] None [  ] Rash  [  ] Wound  [ x ] Ulcer    HEENT:  [ x ] None  [  ] Sore Throat  [  ] Nasal congestion/ runny nose  [  ] Photophobia [  ] Neck pain      Chest:  [ x ] None   [  ] SOB  [  ] Cough  [  ] None    Cardiovascular:   [ x ] None  [  ] CP  [  ] Palpitation    Gastrointestinal:  [ x ] None  [  ] Abd pain   [  ] Nausea    [  ] Vomiting   [  ] Diarrhea	     Genitourinary:  [ x ] None [  ] Polyuria   [  ] Urgency  [  ] Frequency  [  ] Dysuria    [  ]  Hematuria       Musculoskeletal:  [  ] None [  ] Back Pain	[  ] Body aches  [ x ] Right 4th toe wound and right foot redness and swelling.    Neurological: [  ] None [  ]Dizziness  [  ]Visual Disturbance  [  ]Headaches   [ x ] Weakness          PHYSICAL EXAM:  Vital Signs Last 24 Hrs  T(C): 36.8 (18 Feb 2018 05:20), Max: 36.8 (17 Feb 2018 13:16)  T(F): 98.3 (18 Feb 2018 05:20), Max: 98.3 (17 Feb 2018 13:16)  HR: 57 (18 Feb 2018 05:20) (57 - 74)  BP: 116/73 (18 Feb 2018 05:20) (116/73 - 124/71)  BP(mean): --  RR: 16 (18 Feb 2018 05:20) (16 - 16)  SpO2: 97% (18 Feb 2018 05:20) (97% - 98%)    Constitutional:    HEENT: [ x ] Wnl  [  ] Pharyngeal congestion    Neck:  [ x ] Supple  [  ]Lymphadenopathy  [  ] No JVD   [  ] JVD  [  ] Masses   [  ] WNL    CHEST/Respiratory:  [ x ]Clear to auscultation  [  ] Rales   [  ] Rhonchi   [  ] Wheezing     [  ] Chest Tenderness      Cardiovascular:  [ x ] Reg S1 S2   [  ] Irreg S1 S2   [ x ]No Murmur  [  ] +ve Murmurs  [  ]Systolic [  ]Diastolic      Abdomen:  [ x ] Soft  [ x ] No tendrerness  [  ] Tenderness  [  ] Organomegaly  [  ] ABD Distention  [  ] Rigidity                       [ x ] No Regidity                       [ x ] No Rebound Tenderness  [  ] No Guarding Rigidity  [  ] Rebound Tenderness[  ] Guarding Rigidity                          [ x ]  +ve Bowel Sounds  [  ] Decreased Bowel Sounds    [  ] Absent Bowel Sounds                            Extremities: [  ] No edema [ x ] Edema right foot [  ] Clubbing   [  ] Cyanosis                         [ x ] No Tender Calf muscles  [  ] Tender Calf muscles                        [ x ] Palpable peripheral pulses  [ x ] Right 4th toe wound and right foot redness and swelling, +VE tenia pedis with tissue infection. +VE dry scab Left 4th toe.    Neurological: [ x ] Awake  [ x ] Alert  [ x ] Oriented  x  3                           [  ] Confused  [  ] Drowzy  [  ] repond to painful stimuli  [  ] Unresponsive    Skin:  [  ] Intact [  ] Redness [  ] Thrombophlebitis  [  ] Rashes  [  ] Dry  [ x ] Ulcers/ wounds both 4th toes.    Ortho:  [  ] Joint Swelling  [  ] Joint erythema [  ] Joint tenderness                [  ] Increased temp. to touch  [ x ] DJD [  ] WNL            LABS/DIAGNOSTIC TESTS                        12.9   8.4   )-----------( 215      ( 16 Feb 2018 06:21 )             38.8   02-16    141  |  104  |  11  ----------------------------<  229<H>  3.9   |  30  |  0.79    Ca    8.9      16 Feb 2018 06:21  Phos  3.5     02-16  Mg     1.9     02-16    TPro  6.7  /  Alb  3.1<L>  /  TBili  1.1  /  DBili  x   /  AST  14  /  ALT  21  /  AlkPhos  88  02-16    Vancomycin Level, Trough (02.17.18 @ 04:28)    Vancomycin Level, Trough: 6.6: Vancomycin trough levels should be rapidly reached and maintained at  15-20 ug/ml for life threatening MRSA  infections such as sepsis, endocarditis, osteomyelitis and pneumonia. A  first trough level should be drawn  before the 3rd or 4th dose.  Risk of renal toxicity is increased for levels >15 ug/ml, in patients on  other nephrotoxic drugs, who are  hemodynamically unstable, have unstable renal function, or are on  Vancomycin therapy for >14 days. Renal function with  creatinine levels should be monitored for those patients. ug/mL        CULTURES:   Culture - Blood (02.14.18 @ 09:49)    Specimen Source: .Blood Blood-Peripheral    Culture Results:   No growth to date.    Culture - Blood (02.14.18 @ 09:49)    Specimen Source: .Blood Blood-Peripheral    Culture Results:   No growth to date.      EXAM:  MR FOOT RT                            PROCEDURE DATE:  02/15/2018          INTERPRETATION:  History: History of type 2 diabetes. Pain.    Multiplanar multisequence noncontrast MRI of the right foot was performed   from the level of the toes to the level of the talonavicular joint.    Correlation is made with prior radiographs from February 14, 2018.    Findings:    There is cutaneous ulceration at the tip of the fourth toe with   surrounding soft tissue edema and skin thickening consistent with   cellulitis. There is associated osseous edema and hypointense T1 marrow   signal within the distal phalanx of the fourth toe consistent with acute   osteomyelitis.    There is extensive osseous edema centered about the navicular cuneiform   and second through fifth tarsometatarsal articulations with joint space   narrowing and areas of subchondral cystic change. Osseous productive   change and osseous fragmentation is noted at the second and third   tarsometatarsal articulations. These findings are most suggestive of   neuropathic osteoarthropathy. Transversely oriented hypointense line is   noted at the base of the third metatarsal without surrounding osseous   edema likely related to the sequela of a chronic fracture at this site.    There is moderate hallux valgus with moderate first metatarsophalangeal   and hallux sesamoid joint arthrosis. Overlying soft tissue and osseous   bunion is demonstrated. Hammertoe deformities of the first through fourth   toes is demonstrated. Lisfranc ligament appears grossly intact.    There is fatty atrophy and edema within the plantar muscles suggestive of   denervation related changes.    Impression:    Acute osteomyelitis of the distal phalanx of the fourth toe with   surrounding cellulitis.    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   Evidence of prior fracture along the plantar aspect of the third   metatarsal.          Assessment and Recommendation:   52 yr old F from home, lives with son, ambulates independently with former smoker, PMH of IDDM, HTN, HLD, peripheral neuropathy, chronic back and leg pain came with complain b/l foot ulcer with erythema of foot with left ankle swelling  for one day.  Patient was admitted for cellulitis of the right foot and was started on IV Zosyn.  MRI showed Acute osteomyelitis of the distal phalanx of the fourth toe with surrounding cellulitis.      Problem/Recommendation - 1:  Problem: Cellulitis of toe of right foot.   Recommendation:   1- UA & CS.  2- Follow Blood culture to final report.  3- Diflucan 200 mg po q day x 2 weeks.  4- Continue IV Zosyn, and Vancomycin 1 gm IVPB q 12 hours ( If repeat vancomycin trough is less than 10 ) and continue to follow trough closely.  5- Fluid and electrolytes management.  6- CBC and BMP follow up.   7- MRI was +VE for osteomyelitis.  8- Continue IV ABX for total 6 weeks.  9- weekly ESR and CRP x 6 weeks for follow up.    Problem/Recommendation - 2:  ·  Problem: Uncontrolled diabetes mellitus.    Recommendation:   1- Blood sugar monitoring and control.  2- Accu-Cheks with coverage.  3- 1800 tana ADA diet.  4- Follow HB A1C.     Problem/Recommendation - 3:  ·  Problem: HTN (hypertension).    Recommendation:   1- Monitor Blood pressure closely.  2- Blood pressure control.  3- BP. meds as per cardiology and primary care team.       Discussed with NP covering for this patient, and with Patient.
Meds:  fluconAZOLE   Tablet 200 milliGRAM(s) Oral daily  piperacillin/tazobactam IVPB. 3.375 Gram(s) IV Intermittent every 8 hours.  Vancomycin 1 gm IVPB q 12 hours.    Allergies:  Allergies    iodinated radiocontrast agents (Hives)    Intolerances    ROS  [  ] UNABLE TO ELICIT    General:  [  ] None  [  ] Fever  [  ] Chills  [ x ] Malaise    Skin:  [  ] None [  ] Rash  [  ] Wound  [ x ] Ulcer    HEENT:  [ x ] None  [  ] Sore Throat  [  ] Nasal congestion/ runny nose  [  ] Photophobia [  ] Neck pain      Chest:  [ x ] None   [  ] SOB  [  ] Cough  [  ] None    Cardiovascular:   [ x ] None  [  ] CP  [  ] Palpitation    Gastrointestinal:  [ x ] None  [  ] Abd pain   [  ] Nausea    [  ] Vomiting   [  ] Diarrhea	     Genitourinary:  [ x ] None [  ] Polyuria   [  ] Urgency  [  ] Frequency  [  ] Dysuria    [  ]  Hematuria       Musculoskeletal:  [  ] None [  ] Back Pain	[  ] Body aches  [ x ] Right 4th toe wound and right foot redness and swelling.    Neurological: [  ] None [  ]Dizziness  [  ]Visual Disturbance  [  ]Headaches   [ x ] Weakness              PHYSICAL EXAM:  Vital Signs Last 24 Hrs  T(C): 36.5 (16 Feb 2018 05:13), Max: 36.9 (15 Feb 2018 20:31)  T(F): 97.7 (16 Feb 2018 05:13), Max: 98.5 (15 Feb 2018 20:31)  HR: 56 (16 Feb 2018 05:13) (56 - 77)  BP: 139/70 (16 Feb 2018 05:13) (128/75 - 139/70)  BP(mean): 89 (15 Feb 2018 20:31) (89 - 89)  RR: 16 (16 Feb 2018 05:13) (16 - 18)  SpO2: 95% (16 Feb 2018 05:13) (95% - 97%)    Constitutional:    HEENT: [ x ] Wnl  [  ] Pharyngeal congestion    Neck:  [ x ] Supple  [  ]Lymphadenopathy  [  ] No JVD   [  ] JVD  [  ] Masses   [  ] WNL    CHEST/Respiratory:  [ x ]Clear to auscultation  [  ] Rales   [  ] Rhonchi   [  ] Wheezing     [  ] Chest Tenderness      Cardiovascular:  [ x ] Reg S1 S2   [  ] Irreg S1 S2   [ x ]No Murmur  [  ] +ve Murmurs  [  ]Systolic [  ]Diastolic      Abdomen:  [ x ] Soft  [ x ] No tendrerness  [  ] Tenderness  [  ] Organomegaly  [  ] ABD Distention  [  ] Rigidity                       [ x ] No Regidity                       [ x ] No Rebound Tenderness  [  ] No Guarding Rigidity  [  ] Rebound Tenderness[  ] Guarding Rigidity                          [ x ]  +ve Bowel Sounds  [  ] Decreased Bowel Sounds    [  ] Absent Bowel Sounds                            Extremities: [  ] No edema [ x ] Edema right foot [  ] Clubbing   [  ] Cyanosis                         [ x ] No Tender Calf muscles  [  ] Tender Calf muscles                        [ x ] Palpable peripheral pulses  [ x ] Right 4th toe wound and right foot redness and swelling, +VE tenia pedis with tissue infection. +VE dry scab Left 4th toe.    Neurological: [ x ] Awake  [ x ] Alert  [ x ] Oriented  x  3                           [  ] Confused  [  ] Drowzy  [  ] repond to painful stimuli  [  ] Unresponsive    Skin:  [  ] Intact [  ] Redness [  ] Thrombophlebitis  [  ] Rashes  [  ] Dry  [ x ] Ulcers/ wounds both 4th toes.    Ortho:  [  ] Joint Swelling  [  ] Joint erythema [  ] Joint tenderness                [  ] Increased temp. to touch  [ x ] DJD [  ] WNL            LABS/DIAGNOSTIC TESTS                           12.9   8.4   )-----------( 215      ( 16 Feb 2018 06:21 )             38.8     02-15    139  |  104  |  12  ----------------------------<  238<H>  4.3   |  30  |  0.76    Ca    9.0      15 Feb 2018 07:27  Phos  3.3     02-15  Mg     1.8     02-15    TPro  7.9  /  Alb  3.5  /  TBili  1.0  /  DBili  x   /  AST  17  /  ALT  24  /  AlkPhos  130<H>  02-14      LIVER FUNCTIONS - ( 14 Feb 2018 01:52 )  Alb: 3.5 g/dL / Pro: 7.9 g/dL / ALK PHOS: 130 U/L / ALT: 24 U/L DA / AST: 17 U/L / GGT: x           CAPILLARY BLOOD GLUCOSE      POCT Blood Glucose.: 191 mg/dL (15 Feb 2018 21:41)  POCT Blood Glucose.: 162 mg/dL (15 Feb 2018 16:31)  POCT Blood Glucose.: 305 mg/dL (15 Feb 2018 11:32)  POCT Blood Glucose.: 239 mg/dL (15 Feb 2018 07:43)      CULTURES:   Culture - Blood (02.14.18 @ 09:49)    Specimen Source: .Blood Blood-Peripheral    Culture Results:   No growth to date.    Culture - Blood (02.14.18 @ 09:49)    Specimen Source: .Blood Blood-Peripheral    Culture Results:   No growth to date.      EXAM:  MR FOOT RT                            PROCEDURE DATE:  02/15/2018          INTERPRETATION:  History: History of type 2 diabetes. Pain.    Multiplanar multisequence noncontrast MRI of the right foot was performed   from the level of the toes to the level of the talonavicular joint.    Correlation is made with prior radiographs from February 14, 2018.    Findings:    There is cutaneous ulceration at the tip of the fourth toe with   surrounding soft tissue edema and skin thickening consistent with   cellulitis. There is associated osseous edema and hypointense T1 marrow   signal within the distal phalanx of the fourth toe consistent with acute   osteomyelitis.    There is extensive osseous edema centered about the navicular cuneiform   and second through fifth tarsometatarsal articulations with joint space   narrowing and areas of subchondral cystic change. Osseous productive   change and osseous fragmentation is noted at the second and third   tarsometatarsal articulations. These findings are most suggestive of   neuropathic osteoarthropathy. Transversely oriented hypointense line is   noted at the base of the third metatarsal without surrounding osseous   edema likely related to the sequela of a chronic fracture at this site.    There is moderate hallux valgus with moderate first metatarsophalangeal   and hallux sesamoid joint arthrosis. Overlying soft tissue and osseous   bunion is demonstrated. Hammertoe deformities of the first through fourth   toes is demonstrated. Lisfranc ligament appears grossly intact.    There is fatty atrophy and edema within the plantar muscles suggestive of   denervation related changes.    Impression:    Acute osteomyelitis of the distal phalanx of the fourth toe with   surrounding cellulitis.    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   Evidence of prior fracture along the plantar aspect of the third   metatarsal.          Assessment and Recommendation:   52 yr old F from home, lives with son, ambulates independently with former smoker, PMH of IDDM, HTN, HLD, peripheral neuropathy, chronic back and leg pain came with complain b/l foot ulcer with erythema of foot with left ankle swelling  for one day.  Patient was admitted for cellulitis of the right foot and was started on IV Zosyn.  MRI showed Acute osteomyelitis of the distal phalanx of the fourth toe with surrounding cellulitis.      Problem/Recommendation - 1:  Problem: Cellulitis of toe of right foot.   Recommendation:   1- UA & CS.  2- Follow Blood culture to final report.  3- Diflucan 200 mg po q day x 2 weeks.  4- Continue IV Zosyn, and start Vancomycin 1 gm IVPB q 12 hours and follow trough closely.  5- Fluid and electrolytes management.  6- CBC and BMP follow up.   7- MRI was +VE for osteomyelitis.  8- Continue IV ABX for total 6 weeks.  9- No contraindication for PICC line insertion.  10- weekly ESR and CRP x 6 weeks for follow up.    Problem/Recommendation - 2:  ·  Problem: Uncontrolled diabetes mellitus.    Recommendation:   1- Blood sugar monitoring and control.  2- Accu-Cheks with coverage.  3- 1800 tana ADA diet.  4- Follow HB A1C.     Problem/Recommendation - 3:  ·  Problem: HTN (hypertension).    Recommendation:   1- Monitor Blood pressure closely.  2- Blood pressure control.  3- BP. meds as per cardiology and primary care team.       Discussed with NP covering for this patient, and with Patient.
Patient is a 52y old  Female who presents with a chief complaint of B/l foot ulcer/ Left ankle swelling (2018 04:36)    iodinated radiocontrast agents (Hives)      INTERVAL HPI /OVERNIGHT EVENTS: no acute overnight events. On encounter patient offers no new complaints. Remains afebrile.     T(C): 36.8 (02-15-18 @ 14:03), Max: 36.8 (18 @ 21:03)  HR: 77 (02-15-18 @ 14:03) (59 - 77)  BP: 128/75 (02-15-18 @ 14:03) (120/68 - 148/80)  RR: 18 (02-15-18 @ 14:03) (16 - 18)  SpO2: 96% (02-15-18 @ 14:03) (96% - 98%)  I&O's Summary    Vital Signs Last 24 Hrs  T(C): 36.8 (15 Feb 2018 14:03), Max: 36.8 (2018 21:03)  T(F): 98.3 (15 Feb 2018 14:03), Max: 98.3 (2018 21:03)  HR: 77 (15 Feb 2018 14:03) (59 - 77)  BP: 128/75 (15 Feb 2018 14:03) (120/68 - 148/80)  BP(mean): 98 (2018 21:46) (98 - 98)  RR: 18 (15 Feb 2018 14:03) (16 - 18)  SpO2: 96% (15 Feb 2018 14:03) (96% - 98%)  PAST MEDICAL & SURGICAL HISTORY:  HTN (hypertension)  Diabetes  S/P carpal tunnel release  S/P anal fissurectomy  S/P           LABS:                        13.2   8.6   )-----------( 218      ( 15 Feb 2018 07:27 )             40.6     02-15    139  |  104  |  12  ----------------------------<  238<H>  4.3   |  30  |  0.76    Ca    9.0      15 Feb 2018 07:27  Phos  3.3     02-15  Mg     1.8     -15    TPro  7.9  /  Alb  3.5  /  TBili  1.0  /  DBili  x   /  AST  17  /  ALT  24  /  AlkPhos  130<H>  02-14      CAPILLARY BLOOD GLUCOSE      POCT Blood Glucose.: 162 mg/dL (15 Feb 2018 16:31)  POCT Blood Glucose.: 305 mg/dL (15 Feb 2018 11:32)  POCT Blood Glucose.: 239 mg/dL (15 Feb 2018 07:43)  POCT Blood Glucose.: 223 mg/dL (2018 21:08)        Urinalysis Basic - ( 15 Feb 2018 14:14 )    Color: Yellow / Appearance: Clear / S.015 / pH: x  Gluc: x / Ketone: Negative  / Bili: Negative / Urobili: Negative   Blood: x / Protein: 15 / Nitrite: Negative   Leuk Esterase: Small / RBC: 0-2 /HPF / WBC 6-10 /HPF   Sq Epi: x / Non Sq Epi: Moderate /HPF / Bacteria: Few /HPF        MEDICATIONS  (STANDING):  aspirin  chewable 81 milliGRAM(s) Oral daily  enoxaparin Injectable 40 milliGRAM(s) SubCutaneous daily  fluconAZOLE   Tablet 200 milliGRAM(s) Oral daily  gemfibrozil 600 milliGRAM(s) Oral two times a day before meals  insulin glargine Injectable (LANTUS) 30 Unit(s) SubCutaneous at bedtime  insulin lispro (HumaLOG) corrective regimen sliding scale   SubCutaneous Before meals and at bedtime  insulin lispro Injectable (HumaLOG) 15 Unit(s) SubCutaneous three times a day before meals  lisinopril 5 milliGRAM(s) Oral daily  piperacillin/tazobactam IVPB. 3.375 Gram(s) IV Intermittent every 8 hours  sertraline 100 milliGRAM(s) Oral daily  simvastatin 10 milliGRAM(s) Oral at bedtime    MEDICATIONS  (PRN):      REVIEW OF SYSTEMS:  CONSTITUTIONAL: No fever, or weight loss.  EYES: No eye pain, or discharge  ENMT:  No difficulty hearing. No sinus or throat pain  NECK: No pain or stiffness  RESPIRATORY: No cough, wheezing, chills or hemoptysis; No shortness of breath  CARDIOVASCULAR: No chest pain.  GASTROINTESTINAL: No abdominal or epigastric pain. No vomiting, or hematemesis. No melena or hematochezia.  GENITOURINARY: No dysuria, frequency, hematuria, or incontinence  NEUROLOGICAL: No headaches, or memory loss.  SKIN: +bilateral LE ulcers.  LYMPH NODES: No enlarged glands  MUSCULOSKELETAL: +history of chronic back and leg pain.    HEME/LYMPH: No easy bruising, or bleeding gums  ALLERGY AND IMMUNOLOGIC: No hives or eczema    RADIOLOGY & ADDITIONAL TESTS:  < from: MR Foot No Cont, Right (02.15.18 @ 10:48) >  Impression:  Acute osteomyelitis of the distal phalanx of the fourth toe with   surrounding cellulitis.  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   Evidence of prior fracture along the plantar aspect of the third   metatarsal.    < from: MR Foot No Cont, Left (02.15.18 @ 10:48) >  Impression:  Acute osteomyelitis at the distal phalanx of the fourth toe with   surrounding cellulitis.  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  Flexor hallucis longus tenosynovitis.    < from: US Duplex Arterial Lower Ext Compl, Bilateral (18 @ 16:08) >  IMPRESSION: Slightly elevated ABIs which may be seen with calcified   vessels.    < from: Xray Chest 1 View AP/PA (18 @ 01:00) >  Impression: No evidence for pulmonary consolidation, pleural effusion or   pneumothorax.  The trachea is midline.  The cardiac silhouette is within normal limits and unchanged.    < from: Xray Foot AP + Lateral + Oblique, Bilat (18 @ 01:00) >  IMPRESSION:  No discrete soft tissue ulcer or radiographic evidence of osteomyelitis.   Diffuse fourth toe soft tissue swelling on the right.    < from: 12 Lead ECG (17 @ 14:12) >  Diagnosis Line Normal sinus rhythm  Cannot rule out Anterior infarct , age undetermined  Abnormal ECG  Confirmed by ELIZABETH MILLIGAN MD ([id number]) on 2017 11:00:24            Consultant(s) Notes Reviewed:  [x] YES  [ ] NO    PHYSICAL EXAM:  GENERAL: NAD, well-groomed, well-developed female  HEAD:  Atraumatic, Normocephalic  EYES: EOMI, PERRLA, conjunctiva and sclera clear  ENMT: No tonsillar erythema, exudates, or enlargement; Moist mucous membranes, Good dentition, No lesions  NECK: Supple, No JVD, Normal thyroid  NERVOUS SYSTEM:  Alert & Oriented X3, Good concentration; Motor Strength 5/5 B/L upper and lower extremities.  CHEST/LUNG: Good air movement bilaterally  HEART: S1, S2.  ABDOMEN: Soft, Nontender, Nondistended; Bowel sounds present  EXTREMITIES:  2+ Peripheral Pulses  LYMPH: No lymphadenopathy noted  SKIN: +BL LE ulcers    Care Collaborated Discussed with Consultants/Other Providers [x] YES  [ ] NO
Patient is a 52y old  Female who presents with a chief complaint of B/l foot ulcer/ Left ankle swelling (2018 11:21)      INTERVAL HPI/OVERNIGHT EVENTS:  no complaints , Pt will need six weeks of iv antibiotics for osteomylitis;   PICC line placement today and blood cxs still negative to date await final results s/p stress test for ischemic work up, stress test normal with EF of 65%.   T(C): 36.5 (18 @ 05:13), Max: 36.9 (02-15-18 @ 20:31)  HR: 56 (18 @ 05:13) (56 - 77)  BP: 139/70 (18 @ 05:13) (128/75 - 139/70)  RR: 16 (18 @ 05:13) (16 - 18)  SpO2: 95% (18 @ 05:13) (95% - 97%)  Wt(kg): --  I&O's Summary    15 Feb 2018 07:01  -  2018 07:00  --------------------------------------------------------  IN: 450 mL / OUT: 0 mL / NET: 450 mL        PAST MEDICAL & SURGICAL HISTORY:  HTN (hypertension)  Diabetes  S/P carpal tunnel release  S/P anal fissurectomy  S/P       SOCIAL HISTORY  Alcohol:  Tobacco:  Illicit substance use:      FAMILY HISTORY:  Family history of lung cancer (Father)  Family history of diabetes mellitus        LABS:                        12.9   8.4   )-----------( 215      ( 2018 06:21 )             38.8     02-16    141  |  104  |  11  ----------------------------<  229<H>  3.9   |  30  |  0.79    Ca    8.9      2018 06:21  Phos  3.5     02-16  Mg     1.9     02-16    TPro  6.7  /  Alb  3.1<L>  /  TBili  1.1  /  DBili  x   /  AST  14  /  ALT  21  /  AlkPhos  88  02-16    PT/INR - ( 2018 06:21 )   PT: 11.2 sec;   INR: 1.03 ratio         PTT - ( 2018 06:21 )  PTT:30.4 sec  Urinalysis Basic - ( 15 Feb 2018 14:14 )    Color: Yellow / Appearance: Clear / S.015 / pH: x  Gluc: x / Ketone: Negative  / Bili: Negative / Urobili: Negative   Blood: x / Protein: 15 / Nitrite: Negative   Leuk Esterase: Small / RBC: 0-2 /HPF / WBC 6-10 /HPF   Sq Epi: x / Non Sq Epi: Moderate /HPF / Bacteria: Few /HPF      CAPILLARY BLOOD GLUCOSE      POCT Blood Glucose.: 244 mg/dL (2018 11:31)  POCT Blood Glucose.: 241 mg/dL (2018 07:29)  POCT Blood Glucose.: 191 mg/dL (15 Feb 2018 21:41)  POCT Blood Glucose.: 162 mg/dL (15 Feb 2018 16:31)        Urinalysis Basic - ( 15 Feb 2018 14:14 )    Color: Yellow / Appearance: Clear / S.015 / pH: x  Gluc: x / Ketone: Negative  / Bili: Negative / Urobili: Negative   Blood: x / Protein: 15 / Nitrite: Negative   Leuk Esterase: Small / RBC: 0-2 /HPF / WBC 6-10 /HPF   Sq Epi: x / Non Sq Epi: Moderate /HPF / Bacteria: Few /HPF  < from: MR Foot No Cont, Right (02.15.18 @ 10:48) >      < end of copied text >  < from: MR Foot No Cont, Right (02.15.18 @ 10:48) >  Acute osteomyelitis of the distal phalanx of the fourth toe with   surrounding cellulitis.    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   Evidence of prior fracture along the plantar aspect of the third   metatarsal.        < end of copied text >  < from: MR Foot No Cont, Left (02.15.18 @ 10:48) >  Acute osteomyelitis at the distal phalanx of the fourth toe with   surrounding cellulitis.    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    Flexor hallucis longus tenosynovitis.        < end of copied text >            MEDICATIONS  (STANDING):  aspirin  chewable 81 milliGRAM(s) Oral daily  collagenase Ointment 1 Application(s) Topical once  enoxaparin Injectable 40 milliGRAM(s) SubCutaneous daily  fluconAZOLE   Tablet 200 milliGRAM(s) Oral daily  gemfibrozil 600 milliGRAM(s) Oral two times a day before meals  insulin glargine Injectable (LANTUS) 46 Unit(s) SubCutaneous at bedtime  insulin lispro (HumaLOG) corrective regimen sliding scale   SubCutaneous Before meals and at bedtime  insulin lispro Injectable (HumaLOG) 15 Unit(s) SubCutaneous three times a day before meals  lisinopril 5 milliGRAM(s) Oral daily  piperacillin/tazobactam IVPB. 3.375 Gram(s) IV Intermittent every 8 hours  sertraline 100 milliGRAM(s) Oral daily  simvastatin 10 milliGRAM(s) Oral at bedtime  vancomycin  IVPB 1000 milliGRAM(s) IV Intermittent every 12 hours  vancomycin  IVPB        MEDICATIONS  (PRN):      REVIEW OF SYSTEMS:  CONSTITUTIONAL: periods of hot flashes.  EYES: No eye pain, visual disturbances, or discharge  ENMT:  No difficulty hearing, tinnitus, vertigo; No sinus or throat pain  NECK: No pain or stiffness  RESPIRATORY: No cough, wheezing, chills or hemoptysis; No shortness of breath  CARDIOVASCULAR: No chest pain, palpitations, dizziness, or leg swelling  GASTROINTESTINAL: No abdominal or epigastric pain. No nausea, vomiting, or hematemesis; No diarrhea or constipation. No melena or hematochezia.  GENITOURINARY: No dysuria, frequency, hematuria, or incontinence  NEUROLOGICAL: No headaches, memory loss, loss of strength, numbness, or tremors  SKIN: No itching, burning, rashes, or lesions   LYMPH NODES: No enlarged glands  ENDOCRINE: No heat or cold intolerance; No hair loss  MUSCULOSKELETAL: No joint pain or swelling; No muscle, back, or extremity pain  PSYCHIATRIC: depressed , " I am under a lot of stress. "   HEME/LYMPH: No easy bruising, or bleeding gums  ALLERY AND IMMUNOLOGIC: No hives or eczema    RADIOLOGY & ADDITIONAL TESTS:    Imaging Personally Reviewed:  [ ] YES  [ ] NO    Consultant(s) Notes Reviewed:  [ ] YES  [ ] NO    PHYSICAL EXAM:  GENERAL: NAD, non toxic looking. morbidity obesity  HEAD:  Atraumatic, Normocephalic  EYES: EOMI, PERRLA, conjunctiva and sclera clear  ENMT: No tonsillar erythema, exudates, or enlargement; Moist mucous membranes, Good dentition, No lesions  NECK: Supple, No JVD, Normal thyroid  NERVOUS SYSTEM:  Alert & Oriented X3, Good concentration; Motor Strength 5/5 B/L upper and lower extremities; DTRs 2+ intact and symmetric  CHEST/LUNG: Clear to percussion bilaterally; No rales, rhonchi, wheezing, or rubs  HEART: Regular rate and rhythm; No murmurs, rubs, or gallops  ABDOMEN: Soft, Nontender, Nondistended; Bowel sounds present  EXTREMITIES:  2+ Peripheral Pulses, No clubbing, cyanosis, or edema ... left and right metatarsal with ulceration formation , hammer toe and disfigured  LYMPH: No lymphadenopathy noted  SKIN: No rashes or lesions    Care Discussed with Consultants/Other Providers [ ] YES  [ ] NO
Patient was seen and examined  Patient is a 52y old  Female who presents with a chief complaint of B/l foot ulcer/ Left ankle swelling (16 Feb 2018 11:21)      INTERVAL HPI/OVERNIGHT EVENTS:  T(C): 36.8 (02-18-18 @ 05:20), Max: 36.8 (02-17-18 @ 13:16)  HR: 57 (02-18-18 @ 05:20) (57 - 74)  BP: 116/73 (02-18-18 @ 05:20) (116/73 - 124/71)  RR: 16 (02-18-18 @ 05:20) (16 - 16)  SpO2: 97% (02-18-18 @ 05:20) (97% - 98%)  Wt(kg): --  I&O's Summary      LABS:              CAPILLARY BLOOD GLUCOSE      POCT Blood Glucose.: 215 mg/dL (18 Feb 2018 11:09)  POCT Blood Glucose.: 174 mg/dL (18 Feb 2018 07:23)  POCT Blood Glucose.: 131 mg/dL (17 Feb 2018 21:54)  POCT Blood Glucose.: 120 mg/dL (17 Feb 2018 16:21)              MEDICATIONS  (STANDING):  aspirin  chewable 81 milliGRAM(s) Oral daily  collagenase Ointment 1 Application(s) Topical once  enoxaparin Injectable 40 milliGRAM(s) SubCutaneous daily  fluconAZOLE   Tablet 200 milliGRAM(s) Oral daily  gemfibrozil 600 milliGRAM(s) Oral two times a day before meals  insulin glargine Injectable (LANTUS) 46 Unit(s) SubCutaneous at bedtime  insulin lispro (HumaLOG) corrective regimen sliding scale   SubCutaneous Before meals and at bedtime  insulin lispro Injectable (HumaLOG) 15 Unit(s) SubCutaneous three times a day before meals  lisinopril 5 milliGRAM(s) Oral daily  piperacillin/tazobactam IVPB. 3.375 Gram(s) IV Intermittent every 8 hours  sertraline 100 milliGRAM(s) Oral daily  simvastatin 10 milliGRAM(s) Oral at bedtime  sodium chloride 0.9% lock flush 20 milliLiter(s) IV Push once  vancomycin  IVPB 1000 milliGRAM(s) IV Intermittent every 12 hours  vancomycin  IVPB        MEDICATIONS  (PRN):  acetaminophen   Tablet 650 milliGRAM(s) Oral every 6 hours PRN For Temp greater than 38 C (100.4 F)  sodium chloride 0.9% lock flush 10 milliLiter(s) IV Push every 1 hour PRN After each medication administration  sodium chloride 0.9% lock flush 10 milliLiter(s) IV Push every 12 hours PRN Lumen of catheter NOT used      RADIOLOGY & ADDITIONAL TESTS:    Imaging Personally Reviewed:  [ ] YES  [ ] NO    REVIEW OF SYSTEMS:  CONSTITUTIONAL: No fever, weight loss, or fatigue  EYES: No eye pain, visual disturbances, or discharge  ENMT:  No difficulty hearing, tinnitus, vertigo; No sinus or throat pain  NECK: No pain or stiffness  BREASTS: No pain, masses, or nipple discharge  RESPIRATORY: No cough, wheezing, chills or hemoptysis; No shortness of breath  CARDIOVASCULAR: No chest pain, palpitations, dizziness, or leg swelling  GASTROINTESTINAL: No abdominal or epigastric pain. No nausea, vomiting, or hematemesis; No diarrhea or constipation. No melena or hematochezia.  GENITOURINARY: No dysuria, frequency, hematuria, or incontinence  NEUROLOGICAL: No headaches, memory loss, loss of strength, numbness, or tremors  SKIN: No itching, burning, rashes, or lesions   LYMPH NODES: No enlarged glands  ENDOCRINE: No heat or cold intolerance; No hair loss  MUSCULOSKELETAL: No joint pain or swelling; No muscle, back, or extremity pain  PSYCHIATRIC: No depression, anxiety, mood swings, or difficulty sleeping  HEME/LYMPH: No easy bruising, or bleeding gums  ALLERY AND IMMUNOLOGIC: No hives or eczema      Consultant(s) Notes Reviewed:  [ ] YES  [ ] NO    PHYSICAL EXAM:  GENERAL: NAD, well-groomed, well-developed  HEAD:  Atraumatic, Normocephalic  EYES: EOMI, PERRLA, conjunctiva and sclera clear  ENMT: No tonsillar erythema, exudates, or enlargement; Moist mucous membranes, Good dentition, No lesions  NECK: Supple, No JVD, Normal thyroid  NERVOUS SYSTEM:  Alert & Oriented X3, Good concentration; Motor Strength 5/5 B/L upper and lower extremities; DTRs 2+ intact and symmetric  CHEST/LUNG: Clear to percussion bilaterally; No rales, rhonchi, wheezing, or rubs  HEART: Regular rate and rhythm; No murmurs, rubs, or gallops  ABDOMEN: Soft, Nontender, Nondistended; Bowel sounds present  EXTREMITIES:  2+ Peripheral Pulses, No clubbing, cyanosis, or edema  LYMPH: No lymphadenopathy noted  SKIN: No rashes or lesions    Care Discussed with Consultants/Other Providers [ x] YES  [ ] NO
Patient was seen and examined  Patient is a 52y old  Female who presents with a chief complaint of B/l foot ulcer/ Left ankle swelling (16 Feb 2018 11:21)      INTERVAL HPI/OVERNIGHT EVENTS:  T(C): 36.8 (02-20-18 @ 05:43), Max: 36.9 (02-19-18 @ 13:49)  HR: 66 (02-20-18 @ 05:43) (66 - 71)  BP: 101/57 (02-20-18 @ 05:43) (101/57 - 116/67)  RR: 16 (02-20-18 @ 05:43) (14 - 16)  SpO2: 97% (02-20-18 @ 05:43) (94% - 97%)  Wt(kg): --  I&O's Summary      LABS:                        14.4   9.6   )-----------( 239      ( 20 Feb 2018 07:18 )             43.7     02-20    139  |  103  |  18  ----------------------------<  173<H>  4.6   |  29  |  1.00    Ca    9.5      20 Feb 2018 07:18          CAPILLARY BLOOD GLUCOSE      POCT Blood Glucose.: 139 mg/dL (20 Feb 2018 08:20)  POCT Blood Glucose.: 125 mg/dL (20 Feb 2018 05:39)  POCT Blood Glucose.: 92 mg/dL (19 Feb 2018 21:44)  POCT Blood Glucose.: 105 mg/dL (19 Feb 2018 16:35)  POCT Blood Glucose.: 139 mg/dL (19 Feb 2018 11:31)              MEDICATIONS  (STANDING):  aspirin  chewable 81 milliGRAM(s) Oral daily  collagenase Ointment 1 Application(s) Topical once  enoxaparin Injectable 40 milliGRAM(s) SubCutaneous daily  fluconAZOLE   Tablet 200 milliGRAM(s) Oral daily  gemfibrozil 600 milliGRAM(s) Oral two times a day before meals  insulin glargine Injectable (LANTUS) 46 Unit(s) SubCutaneous at bedtime  insulin lispro (HumaLOG) corrective regimen sliding scale   SubCutaneous Before meals and at bedtime  insulin lispro Injectable (HumaLOG) 15 Unit(s) SubCutaneous three times a day before meals  lisinopril 5 milliGRAM(s) Oral daily  piperacillin/tazobactam IVPB. 3.375 Gram(s) IV Intermittent every 8 hours  sertraline 100 milliGRAM(s) Oral daily  simvastatin 10 milliGRAM(s) Oral at bedtime  sodium chloride 0.9% lock flush 20 milliLiter(s) IV Push once  vancomycin  IVPB 1250 milliGRAM(s) IV Intermittent every 12 hours    MEDICATIONS  (PRN):  acetaminophen   Tablet 650 milliGRAM(s) Oral every 6 hours PRN For Temp greater than 38 C (100.4 F)  sodium chloride 0.9% lock flush 10 milliLiter(s) IV Push every 1 hour PRN After each medication administration  sodium chloride 0.9% lock flush 10 milliLiter(s) IV Push every 12 hours PRN Lumen of catheter NOT used      RADIOLOGY & ADDITIONAL TESTS:    Imaging Personally Reviewed:  [ ] YES  [ ] NO    REVIEW OF SYSTEMS:  CONSTITUTIONAL: No fever, weight loss, or fatigue  EYES: No eye pain, visual disturbances, or discharge  ENMT:  No difficulty hearing, tinnitus, vertigo; No sinus or throat pain  NECK: No pain or stiffness  BREASTS: No pain, masses, or nipple discharge  RESPIRATORY: No cough, wheezing, chills or hemoptysis; No shortness of breath  CARDIOVASCULAR: No chest pain, palpitations, dizziness, or leg swelling  GASTROINTESTINAL: No abdominal or epigastric pain. No nausea, vomiting, or hematemesis; No diarrhea or constipation. No melena or hematochezia.  GENITOURINARY: No dysuria, frequency, hematuria, or incontinence  NEUROLOGICAL: No headaches, memory loss, loss of strength, numbness, or tremors  SKIN: No itching, burning, rashes, or lesions   LYMPH NODES: No enlarged glands  ENDOCRINE: No heat or cold intolerance; No hair loss  MUSCULOSKELETAL: No joint pain or swelling; No muscle, back, or extremity pain  PSYCHIATRIC: No depression, anxiety, mood swings, or difficulty sleeping  HEME/LYMPH: No easy bruising, or bleeding gums  ALLERY AND IMMUNOLOGIC: No hives or eczema      Consultant(s) Notes Reviewed:  [ x ] YES  [ ] NO    PHYSICAL EXAM:  GENERAL: NAD, well-groomed, well-developed  HEAD:  Atraumatic, Normocephalic  EYES: EOMI, PERRLA, conjunctiva and sclera clear  ENMT: No tonsillar erythema, exudates, or enlargement; Moist mucous membranes, Good dentition, No lesions  NECK: Supple, No JVD, Normal thyroid  NERVOUS SYSTEM:  Alert & Oriented X3, Good concentration; Motor Strength 5/5 B/L upper and lower extremities; DTRs 2+ intact and symmetric  CHEST/LUNG: Clear to percussion bilaterally; No rales, rhonchi, wheezing, or rubs  HEART: Regular rate and rhythm; No murmurs, rubs, or gallops  ABDOMEN: Soft, Nontender, Nondistended; Bowel sounds present  EXTREMITIES:  le elcer   LYMPH: No lymphadenopathy noted  SKIN: No rashes or lesions    Care Discussed with Consultants/Other Providers [ x] YES  [ ] NO
Patient was seen and examined  Patient is a 52y old  Female who presents with a chief complaint of B/l foot ulcer/ Left ankle swelling (16 Feb 2018 11:21)  sp picc line left ue doing well    INTERVAL HPI/OVERNIGHT EVENTS:  T(C): 36.7 (02-19-18 @ 05:00), Max: 36.8 (02-18-18 @ 20:50)  HR: 64 (02-19-18 @ 05:00) (64 - 78)  BP: 124/79 (02-19-18 @ 05:00) (107/69 - 124/79)  RR: 16 (02-19-18 @ 05:00) (14 - 16)  SpO2: 98% (02-19-18 @ 05:00) (95% - 98%)  Wt(kg): --  I&O's Summary      CAPILLARY BLOOD GLUCOSE      POCT Blood Glucose.: 135 mg/dL (19 Feb 2018 07:53)  POCT Blood Glucose.: 168 mg/dL (18 Feb 2018 21:15)  POCT Blood Glucose.: 95 mg/dL (18 Feb 2018 16:16)  POCT Blood Glucose.: 215 mg/dL (18 Feb 2018 11:09)              MEDICATIONS  (STANDING):  aspirin  chewable 81 milliGRAM(s) Oral daily  collagenase Ointment 1 Application(s) Topical once  enoxaparin Injectable 40 milliGRAM(s) SubCutaneous daily  fluconAZOLE   Tablet 200 milliGRAM(s) Oral daily  gemfibrozil 600 milliGRAM(s) Oral two times a day before meals  insulin glargine Injectable (LANTUS) 46 Unit(s) SubCutaneous at bedtime  insulin lispro (HumaLOG) corrective regimen sliding scale   SubCutaneous Before meals and at bedtime  insulin lispro Injectable (HumaLOG) 15 Unit(s) SubCutaneous three times a day before meals  lisinopril 5 milliGRAM(s) Oral daily  piperacillin/tazobactam IVPB. 3.375 Gram(s) IV Intermittent every 8 hours  sertraline 100 milliGRAM(s) Oral daily  simvastatin 10 milliGRAM(s) Oral at bedtime  sodium chloride 0.9% lock flush 20 milliLiter(s) IV Push once  vancomycin  IVPB 1000 milliGRAM(s) IV Intermittent every 12 hours  vancomycin  IVPB        MEDICATIONS  (PRN):  acetaminophen   Tablet 650 milliGRAM(s) Oral every 6 hours PRN For Temp greater than 38 C (100.4 F)  sodium chloride 0.9% lock flush 10 milliLiter(s) IV Push every 1 hour PRN After each medication administration  sodium chloride 0.9% lock flush 10 milliLiter(s) IV Push every 12 hours PRN Lumen of catheter NOT used      RADIOLOGY & ADDITIONAL TESTS:    Imaging Personally Reviewed:  [ ] YES  [ ] NO    REVIEW OF SYSTEMS:  CONSTITUTIONAL: No fever, weight loss, or fatigue  EYES: No eye pain, visual disturbances, or discharge  ENMT:  No difficulty hearing, tinnitus, vertigo; No sinus or throat pain  NECK: No pain or stiffness  BREASTS: No pain, masses, or nipple discharge  RESPIRATORY: No cough, wheezing, chills or hemoptysis; No shortness of breath  CARDIOVASCULAR: No chest pain, palpitations, dizziness, or leg swelling  GASTROINTESTINAL: No abdominal or epigastric pain. No nausea, vomiting, or hematemesis; No diarrhea or constipation. No melena or hematochezia.  GENITOURINARY: No dysuria, frequency, hematuria, or incontinence  NEUROLOGICAL: No headaches, memory loss, loss of strength, numbness, or tremors  SKIN: No itching, burning, rashes, or lesions   LYMPH NODES: No enlarged glands  ENDOCRINE: No heat or cold intolerance; No hair loss  MUSCULOSKELETAL: No joint pain or swelling; No muscle, back, or extremity pain  PSYCHIATRIC: No depression, anxiety, mood swings, or difficulty sleeping  HEME/LYMPH: No easy bruising, or bleeding gums  ALLERY AND IMMUNOLOGIC: No hives or eczema      Consultant(s) Notes Reviewed:  [ x ] YES  [ ] NO    PHYSICAL EXAM:  GENERAL: NAD, well-groomed, well-developed  HEAD:  Atraumatic, Normocephalic  EYES: EOMI, PERRLA, conjunctiva and sclera clear  ENMT: No tonsillar erythema, exudates, or enlargement; Moist mucous membranes, Good dentition, No lesions  NECK: Supple, No JVD, Normal thyroid  NERVOUS SYSTEM:  Alert & Oriented X3, Good concentration; Motor Strength 5/5 B/L upper and lower extremities; DTRs 2+ intact and symmetric  CHEST/LUNG: Clear to percussion bilaterally; No rales, rhonchi, wheezing, or rubs  HEART: Regular rate and rhythm; No murmurs, rubs, or gallops  ABDOMEN: Soft, Nontender, Nondistended; Bowel sounds present  EXTREMITIES:  2+ Peripheral Pulses, No clubbing, cyanosis, or edema  LYMPH: No lymphadenopathy noted  SKIN: No rashes or lesions    Care Discussed with Consultants/Other Providers [ x] YES  [ ] NO
S : 52y year old Female seen bedside for Right 4th digit ulceration.  Patient is NAD and AAO x3.     Patient admits to  (-) Fevers, (-) Chills, (-) Nausea, (-) Vomiting, (-) Shortness of Breath      PMH: HTN (hypertension)  Diabetes    PSH:S/P carpal tunnel release  S/P anal fissurectomy  S/P       Allergies:iodinated radiocontrast agents (Hives)      Labs:                        12.9   8.4   )-----------( 215      ( 2018 06:21 )             38.8   02-16    141  |  104  |  11  ----------------------------<  229<H>  3.9   |  30  |  0.79    Ca    8.9      2018 06:21  Phos  3.5     02-16  Mg     1.9     02-16    TPro  6.7  /  Alb  3.1<L>  /  TBili  1.1  /  DBili  x   /  AST  14  /  ALT  21  /  AlkPhos  88  02-16      O:   General: Pleasant  female NAD & AOX3.    Integument:  Skin warm, dry and supple bilateral.    Ulceration Right distal digit fibrogranular in nature, - hyperkeratotic border, wound base Fibrogranular,  resolving edema, resolving joe-wound erythema, - purulence, - fluctuance, - tracking/tunneling, - probe to bone.   Vascular: Dorsalis Pedis and Posterior Tibial pulses 2/4.  Capillary re-fill time less then 3 seconds digits 1-5 bilateral.    Neuro: Protective sensation diminished to the level of the digits bilateral.  MSK: Muscle strength 5/5 all major muscle groups bilateral.    Deformity:  A: Right distal digit ulceration and cellulitis       P:   Chart reviewed and Patient evaluated  Discussed diagnosis and treatment with patient  X-rays reviewed  MRI reviewed, patient will be receiving PICC for OM   SANDHYA reviewed, patient has biphasic waveforms   Continue with IV antibiotics As Per ID  WBAT right foot, no weight bearing restrictions on the left foot   Examined patient bedside with Dr. Senior, spoke to patient about flexor tenotomies in the office. 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 Patient is to follow up with Dr. Senior at [phone] [place] [address] on Tuesday/Wednesday of upcoming week for office procedure.   Offloading to bilateral Heels when in bed   Discussed importance of daily foot examinations and proper shoe gear and to importance of lower Fasting Blood Glucose levels.   Podiatry stable for discharge  Discussed with Parrish
S : 52y year old Female seen bedside for Right 4th digit ulceration.  Patient is NAD and AAO x3.     Patient admits to  (-) Fevers, (-) Chills, (-) Nausea, (-) Vomiting, (-) Shortness of Breath      PMH: HTN (hypertension)  Diabetes    PSH:S/P carpal tunnel release  S/P anal fissurectomy  S/P       Allergies:iodinated radiocontrast agents (Hives)      Labs:                   O:   General: Pleasant  female NAD & AOX3.    Integument:  Skin warm, dry and supple bilateral.    Ulceration Right distal digit fibrogranular in nature, - hyperkeratotic border, wound base Fibrogranular,  resolving edema, resolving joe-wound erythema, - purulence, - fluctuance, - tracking/tunneling, - probe to bone.   Vascular: Dorsalis Pedis and Posterior Tibial pulses 2/4.  Capillary re-fill time less then 3 seconds digits 1-5 bilateral.    Neuro: Protective sensation diminished to the level of the digits bilateral.  MSK: Muscle strength 5/5 all major muscle groups bilateral.    Deformity:  A: Right distal digit ulceration and cellulitis       P:   Chart reviewed and Patient evaluated  Discussed diagnosis and treatment with patient  X-rays reviewed  MRI reviewed, patient will be receiving PICC for OM   SANDHYA reviewed, patient has biphasic waveforms   Continue with IV antibiotics As Per ID  WBAT right foot, no weight bearing restrictions on the left foot   Examined patient bedside with Dr. Senior, spoke to patient about flexor tenotomies in the office. 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 Patient is to follow up with Dr. Senior at [phone] [place] [address] on Tuesday/Wednesday of upcoming week for office procedure.   Offloading to bilateral Heels when in bed   Discussed importance of daily foot examinations and proper shoe gear and to importance of lower Fasting Blood Glucose levels.   Podiatry stable for discharge, reconsult as needed  Discussed with Parrish
CHIEF COMPLAINT:Patient is a 52y old  Female who presents with a chief complaint of B/l foot ulcer/ Left ankle swelling .Pt appears comfortable.    	  REVIEW OF SYSTEMS:  CONSTITUTIONAL: No fever, weight loss, or fatigue  EYES: No eye pain, visual disturbances, or discharge  ENT:  No difficulty hearing, tinnitus, vertigo; No sinus or throat pain  NECK: No pain or stiffness  RESPIRATORY: No cough, wheezing, chills or hemoptysis; No Shortness of Breath  CARDIOVASCULAR: No chest pain, palpitations, passing out, dizziness, or leg swelling  GASTROINTESTINAL: No abdominal or epigastric pain. No nausea, vomiting, or hematemesis; No diarrhea or constipation. No melena or hematochezia.  GENITOURINARY: No dysuria, frequency, hematuria, or incontinence  NEUROLOGICAL: No headaches, memory loss, loss of strength, numbness, or tremors  SKIN: No itching, burning, rashes, or lesions   LYMPH Nodes: No enlarged glands  ENDOCRINE: No heat or cold intolerance; No hair loss  MUSCULOSKELETAL: No joint pain or swelling; No muscle, back, or extremity pain  PSYCHIATRIC: No depression, anxiety, mood swings, or difficulty sleeping  HEME/LYMPH: No easy bruising, or bleeding gums  ALLERGY AND IMMUNOLOGIC: No hives or eczema	      PHYSICAL EXAM:  T(C): 36.4 (02-18-18 @ 12:58), Max: 36.8 (02-17-18 @ 13:16)  HR: 71 (02-18-18 @ 12:58) (57 - 74)  BP: 118/74 (02-18-18 @ 12:58) (116/73 - 124/71)  RR: 14 (02-18-18 @ 12:58) (14 - 16)  SpO2: 97% (02-18-18 @ 12:58) (97% - 98%)      Appearance: Normal	  HEENT:   Normal oral mucosa, PERRL, EOMI	  Lymphatic: No lymphadenopathy  Cardiovascular: Normal S1 S2, No JVD, No murmurs, No edema  Respiratory: Lungs clear to auscultation	  Psychiatry: A & O x 3, Mood & affect appropriate  Gastrointestinal:  Soft, Non-tender, + BS	  Skin: No rashes, No ecchymoses, No cyanosis	  Neurologic: Non-focal  Extremities: Normal range of motion, No clubbing, cyanosis or edema  Vascular: Peripheral pulses palpable 2+ bilaterally    MEDICATIONS  (STANDING):  aspirin  chewable 81 milliGRAM(s) Oral daily  collagenase Ointment 1 Application(s) Topical once  enoxaparin Injectable 40 milliGRAM(s) SubCutaneous daily  fluconAZOLE   Tablet 200 milliGRAM(s) Oral daily  gemfibrozil 600 milliGRAM(s) Oral two times a day before meals  insulin glargine Injectable (LANTUS) 46 Unit(s) SubCutaneous at bedtime  insulin lispro (HumaLOG) corrective regimen sliding scale   SubCutaneous Before meals and at bedtime  insulin lispro Injectable (HumaLOG) 15 Unit(s) SubCutaneous three times a day before meals  lisinopril 5 milliGRAM(s) Oral daily  piperacillin/tazobactam IVPB. 3.375 Gram(s) IV Intermittent every 8 hours  sertraline 100 milliGRAM(s) Oral daily  simvastatin 10 milliGRAM(s) Oral at bedtime  sodium chloride 0.9% lock flush 20 milliLiter(s) IV Push once  vancomycin  IVPB 1000 milliGRAM(s) IV Intermittent every 12 hours  vancomycin  IVPB          	  LABS:	 	      Lipid Profile: Cholesterol 134  LDL 60  HDL 35      HgA1c: Hemoglobin A1C, Whole Blood: 11.3 % (02-15 @ 09:54)    TSH: Thyroid Stimulating Hormone, Serum: 1.42 uU/mL (02-14 @ 10:48)
Patient was seen and examined  Patient is a 52y old  Female who presents with a chief complaint of B/l foot ulcer/ Left ankle swelling (2018 11:21)      INTERVAL HPI/OVERNIGHT EVENTS:  T(C): 36.4 (18 @ 05:18), Max: 36.6 (18 @ 14:57)  HR: 55 (18 @ 05:18) (55 - 72)  BP: 118/60 (18 @ 05:18) (118/60 - 168/79)  RR: 16 (18 @ 05:18) (14 - 16)  SpO2: 96% (18 @ 05:18) (96% - 97%)  Wt(kg): --  I&O's Summary    2018 07:01  -  2018 07:00  --------------------------------------------------------  IN: 450 mL / OUT: 0 mL / NET: 450 mL        LABS:                        12.9   8.4   )-----------( 215      ( 2018 06:21 )             38.8     02-16    141  |  104  |  11  ----------------------------<  229<H>  3.9   |  30  |  0.79    Ca    8.9      2018 06:21  Phos  3.5     02-16  Mg     1.9     02-16    TPro  6.7  /  Alb  3.1<L>  /  TBili  1.1  /  DBili  x   /  AST  14  /  ALT  21  /  AlkPhos  88  02-16    PT/INR - ( 2018 06:21 )   PT: 11.2 sec;   INR: 1.03 ratio         PTT - ( 2018 06:21 )  PTT:30.4 sec  Urinalysis Basic - ( 15 Feb 2018 14:14 )    Color: Yellow / Appearance: Clear / S.015 / pH: x  Gluc: x / Ketone: Negative  / Bili: Negative / Urobili: Negative   Blood: x / Protein: 15 / Nitrite: Negative   Leuk Esterase: Small / RBC: 0-2 /HPF / WBC 6-10 /HPF   Sq Epi: x / Non Sq Epi: Moderate /HPF / Bacteria: Few /HPF      CAPILLARY BLOOD GLUCOSE      POCT Blood Glucose.: 210 mg/dL (2018 07:38)  POCT Blood Glucose.: 150 mg/dL (2018 21:05)  POCT Blood Glucose.: 177 mg/dL (2018 16:33)  POCT Blood Glucose.: 244 mg/dL (2018 11:31)          Urinalysis Basic - ( 15 Feb 2018 14:14 )    Color: Yellow / Appearance: Clear / S.015 / pH: x  Gluc: x / Ketone: Negative  / Bili: Negative / Urobili: Negative   Blood: x / Protein: 15 / Nitrite: Negative   Leuk Esterase: Small / RBC: 0-2 /HPF / WBC 6-10 /HPF   Sq Epi: x / Non Sq Epi: Moderate /HPF / Bacteria: Few /HPF        MEDICATIONS  (STANDING):  aspirin  chewable 81 milliGRAM(s) Oral daily  collagenase Ointment 1 Application(s) Topical once  enoxaparin Injectable 40 milliGRAM(s) SubCutaneous daily  fluconAZOLE   Tablet 200 milliGRAM(s) Oral daily  gemfibrozil 600 milliGRAM(s) Oral two times a day before meals  insulin glargine Injectable (LANTUS) 46 Unit(s) SubCutaneous at bedtime  insulin lispro (HumaLOG) corrective regimen sliding scale   SubCutaneous Before meals and at bedtime  insulin lispro Injectable (HumaLOG) 15 Unit(s) SubCutaneous three times a day before meals  lisinopril 5 milliGRAM(s) Oral daily  piperacillin/tazobactam IVPB. 3.375 Gram(s) IV Intermittent every 8 hours  sertraline 100 milliGRAM(s) Oral daily  simvastatin 10 milliGRAM(s) Oral at bedtime  sodium chloride 0.9% lock flush 20 milliLiter(s) IV Push once  vancomycin  IVPB 1000 milliGRAM(s) IV Intermittent every 12 hours  vancomycin  IVPB        MEDICATIONS  (PRN):  acetaminophen   Tablet 650 milliGRAM(s) Oral every 6 hours PRN For Temp greater than 38 C (100.4 F)  sodium chloride 0.9% lock flush 10 milliLiter(s) IV Push every 1 hour PRN After each medication administration  sodium chloride 0.9% lock flush 10 milliLiter(s) IV Push every 12 hours PRN Lumen of catheter NOT used      RADIOLOGY & ADDITIONAL TESTS:    Imaging Personally Reviewed:  [ ] YES  [ ] NO    REVIEW OF SYSTEMS:  CONSTITUTIONAL: No fever, weight loss, or fatigue  EYES: No eye pain, visual disturbances, or discharge  ENMT:  No difficulty hearing, tinnitus, vertigo; No sinus or throat pain  NECK: No pain or stiffness  BREASTS: No pain, masses, or nipple discharge  RESPIRATORY: No cough, wheezing, chills or hemoptysis; No shortness of breath  CARDIOVASCULAR: No chest pain, palpitations, dizziness, or leg swelling  GASTROINTESTINAL: No abdominal or epigastric pain. No nausea, vomiting, or hematemesis; No diarrhea or constipation. No melena or hematochezia.  GENITOURINARY: No dysuria, frequency, hematuria, or incontinence  NEUROLOGICAL: No headaches, memory loss, loss of strength, numbness, or tremors  SKIN: No itching, burning, rashes, or lesions   LYMPH NODES: No enlarged glands  ENDOCRINE: No heat or cold intolerance; No hair loss  MUSCULOSKELETAL: No joint pain or swelling; No muscle, back, or extremity pain  PSYCHIATRIC: No depression, anxiety, mood swings, or difficulty sleeping  HEME/LYMPH: No easy bruising, or bleeding gums  ALLERY AND IMMUNOLOGIC: No hives or eczema      Consultant(s) Notes Reviewed:  [ x ] YES  [ ] NO    PHYSICAL EXAM:  GENERAL: NAD, well-groomed, well-developed  HEAD:  Atraumatic, Normocephalic  EYES: EOMI, PERRLA, conjunctiva and sclera clear  ENMT: No tonsillar erythema, exudates, or enlargement; Moist mucous membranes, Good dentition, No lesions  NECK: Supple, No JVD, Normal thyroid  NERVOUS SYSTEM:  Alert & Oriented X3, Good concentration; Motor Strength 5/5 B/L upper and lower extremities; DTRs 2+ intact and symmetric  CHEST/LUNG: Clear to percussion bilaterally; No rales, rhonchi, wheezing, or rubs  HEART: Regular rate and rhythm; No murmurs, rubs, or gallops  ABDOMEN: Soft, Nontender, Nondistended; Bowel sounds present  EXTREMITIES:  2+ Peripheral Pulses, No clubbing, cyanosis, or edema  LYMPH: No lymphadenopathy noted  SKIN: No rashes or lesions    Care Discussed with Consultants/Other Providers [ x] YES  [ ] NO

## 2018-02-21 NOTE — PROGRESS NOTE ADULT - PROVIDER SPECIALTY LIST ADULT
Cardiology
Endocrinology
Infectious Disease
Internal Medicine
Podiatry
Podiatry
Cardiology
Infectious Disease
Infectious Disease
Cardiology
Cardiology
Internal Medicine
Internal Medicine

## 2018-05-16 ENCOUNTER — INPATIENT (INPATIENT)
Facility: HOSPITAL | Age: 53
LOS: 4 days | Discharge: HOME | DRG: 638 | End: 2018-05-21
Attending: INTERNAL MEDICINE | Admitting: INTERNAL MEDICINE
Payer: MEDICARE

## 2018-05-16 VITALS
DIASTOLIC BLOOD PRESSURE: 78 MMHG | WEIGHT: 288.81 LBS | RESPIRATION RATE: 18 BRPM | OXYGEN SATURATION: 97 % | SYSTOLIC BLOOD PRESSURE: 126 MMHG | HEART RATE: 86 BPM

## 2018-05-16 DIAGNOSIS — I10 ESSENTIAL (PRIMARY) HYPERTENSION: ICD-10-CM

## 2018-05-16 DIAGNOSIS — F32.9 MAJOR DEPRESSIVE DISORDER, SINGLE EPISODE, UNSPECIFIED: ICD-10-CM

## 2018-05-16 DIAGNOSIS — E11.621 TYPE 2 DIABETES MELLITUS WITH FOOT ULCER: ICD-10-CM

## 2018-05-16 DIAGNOSIS — Z29.9 ENCOUNTER FOR PROPHYLACTIC MEASURES, UNSPECIFIED: ICD-10-CM

## 2018-05-16 DIAGNOSIS — L03.039 CELLULITIS OF UNSPECIFIED TOE: ICD-10-CM

## 2018-05-16 DIAGNOSIS — E11.9 TYPE 2 DIABETES MELLITUS WITHOUT COMPLICATIONS: ICD-10-CM

## 2018-05-16 LAB
ACETONE SERPL-MCNC: NEGATIVE — SIGNIFICANT CHANGE UP
ALBUMIN SERPL ELPH-MCNC: 3.4 G/DL — LOW (ref 3.5–5)
ALP SERPL-CCNC: 101 U/L — SIGNIFICANT CHANGE UP (ref 40–120)
ALT FLD-CCNC: 25 U/L DA — SIGNIFICANT CHANGE UP (ref 10–60)
ANION GAP SERPL CALC-SCNC: 9 MMOL/L — SIGNIFICANT CHANGE UP (ref 5–17)
APPEARANCE UR: CLEAR — SIGNIFICANT CHANGE UP
APTT BLD: 28.9 SEC — SIGNIFICANT CHANGE UP (ref 27.5–37.4)
AST SERPL-CCNC: 20 U/L — SIGNIFICANT CHANGE UP (ref 10–40)
BASOPHILS # BLD AUTO: 0.1 K/UL — SIGNIFICANT CHANGE UP (ref 0–0.2)
BASOPHILS NFR BLD AUTO: 1.3 % — SIGNIFICANT CHANGE UP (ref 0–2)
BILIRUB SERPL-MCNC: 0.8 MG/DL — SIGNIFICANT CHANGE UP (ref 0.2–1.2)
BILIRUB UR-MCNC: NEGATIVE — SIGNIFICANT CHANGE UP
BUN SERPL-MCNC: 14 MG/DL — SIGNIFICANT CHANGE UP (ref 7–18)
CALCIUM SERPL-MCNC: 9.4 MG/DL — SIGNIFICANT CHANGE UP (ref 8.4–10.5)
CHLORIDE SERPL-SCNC: 104 MMOL/L — SIGNIFICANT CHANGE UP (ref 96–108)
CO2 SERPL-SCNC: 26 MMOL/L — SIGNIFICANT CHANGE UP (ref 22–31)
COLOR SPEC: YELLOW — SIGNIFICANT CHANGE UP
CREAT SERPL-MCNC: 0.91 MG/DL — SIGNIFICANT CHANGE UP (ref 0.5–1.3)
DIFF PNL FLD: NEGATIVE — SIGNIFICANT CHANGE UP
EOSINOPHIL # BLD AUTO: 0.6 K/UL — HIGH (ref 0–0.5)
EOSINOPHIL NFR BLD AUTO: 5.5 % — SIGNIFICANT CHANGE UP (ref 0–6)
GLUCOSE BLDC GLUCOMTR-MCNC: 222 MG/DL — HIGH (ref 70–99)
GLUCOSE BLDC GLUCOMTR-MCNC: 263 MG/DL — HIGH (ref 70–99)
GLUCOSE SERPL-MCNC: 167 MG/DL — HIGH (ref 70–99)
GLUCOSE UR QL: 100 MG/DL
HCT VFR BLD CALC: 44.6 % — SIGNIFICANT CHANGE UP (ref 34.5–45)
HGB BLD-MCNC: 14 G/DL — SIGNIFICANT CHANGE UP (ref 11.5–15.5)
INR BLD: 1 RATIO — SIGNIFICANT CHANGE UP (ref 0.88–1.16)
KETONES UR-MCNC: NEGATIVE — SIGNIFICANT CHANGE UP
LACTATE SERPL-SCNC: 2.4 MMOL/L — HIGH (ref 0.7–2)
LACTATE SERPL-SCNC: 2.4 MMOL/L — HIGH (ref 0.7–2)
LEUKOCYTE ESTERASE UR-ACNC: ABNORMAL
LYMPHOCYTES # BLD AUTO: 2.8 K/UL — SIGNIFICANT CHANGE UP (ref 1–3.3)
LYMPHOCYTES # BLD AUTO: 26.3 % — SIGNIFICANT CHANGE UP (ref 13–44)
MCHC RBC-ENTMCNC: 27.3 PG — SIGNIFICANT CHANGE UP (ref 27–34)
MCHC RBC-ENTMCNC: 31.3 GM/DL — LOW (ref 32–36)
MCV RBC AUTO: 87.1 FL — SIGNIFICANT CHANGE UP (ref 80–100)
MONOCYTES # BLD AUTO: 0.7 K/UL — SIGNIFICANT CHANGE UP (ref 0–0.9)
MONOCYTES NFR BLD AUTO: 7 % — SIGNIFICANT CHANGE UP (ref 2–14)
NEUTROPHILS # BLD AUTO: 6.4 K/UL — SIGNIFICANT CHANGE UP (ref 1.8–7.4)
NEUTROPHILS NFR BLD AUTO: 59.8 % — SIGNIFICANT CHANGE UP (ref 43–77)
NITRITE UR-MCNC: NEGATIVE — SIGNIFICANT CHANGE UP
PH UR: 5 — SIGNIFICANT CHANGE UP (ref 5–8)
PLATELET # BLD AUTO: 278 K/UL — SIGNIFICANT CHANGE UP (ref 150–400)
POTASSIUM SERPL-MCNC: 3.7 MMOL/L — SIGNIFICANT CHANGE UP (ref 3.5–5.3)
POTASSIUM SERPL-SCNC: 3.7 MMOL/L — SIGNIFICANT CHANGE UP (ref 3.5–5.3)
PROT SERPL-MCNC: 7.8 G/DL — SIGNIFICANT CHANGE UP (ref 6–8.3)
PROT UR-MCNC: NEGATIVE — SIGNIFICANT CHANGE UP
PROTHROM AB SERPL-ACNC: 10.9 SEC — SIGNIFICANT CHANGE UP (ref 9.8–12.7)
RBC # BLD: 5.12 M/UL — SIGNIFICANT CHANGE UP (ref 3.8–5.2)
RBC # FLD: 12.7 % — SIGNIFICANT CHANGE UP (ref 10.3–14.5)
SODIUM SERPL-SCNC: 139 MMOL/L — SIGNIFICANT CHANGE UP (ref 135–145)
SP GR SPEC: 1.02 — SIGNIFICANT CHANGE UP (ref 1.01–1.02)
UROBILINOGEN FLD QL: NEGATIVE — SIGNIFICANT CHANGE UP
WBC # BLD: 10.6 K/UL — HIGH (ref 3.8–10.5)
WBC # FLD AUTO: 10.6 K/UL — HIGH (ref 3.8–10.5)

## 2018-05-16 PROCEDURE — 99285 EMERGENCY DEPT VISIT HI MDM: CPT

## 2018-05-16 PROCEDURE — 73630 X-RAY EXAM OF FOOT: CPT | Mod: 26,50

## 2018-05-16 RX ORDER — HYDROCHLOROTHIAZIDE 25 MG
12.5 TABLET ORAL DAILY
Qty: 0 | Refills: 0 | Status: DISCONTINUED | OUTPATIENT
Start: 2018-05-16 | End: 2018-05-21

## 2018-05-16 RX ORDER — LISINOPRIL 2.5 MG/1
0.5 TABLET ORAL
Qty: 0 | Refills: 0 | COMMUNITY

## 2018-05-16 RX ORDER — GLUCAGON INJECTION, SOLUTION 0.5 MG/.1ML
1 INJECTION, SOLUTION SUBCUTANEOUS ONCE
Qty: 0 | Refills: 0 | Status: DISCONTINUED | OUTPATIENT
Start: 2018-05-16 | End: 2018-05-17

## 2018-05-16 RX ORDER — LISINOPRIL 2.5 MG/1
10 TABLET ORAL DAILY
Qty: 0 | Refills: 0 | Status: DISCONTINUED | OUTPATIENT
Start: 2018-05-16 | End: 2018-05-16

## 2018-05-16 RX ORDER — SODIUM CHLORIDE 9 MG/ML
1000 INJECTION INTRAMUSCULAR; INTRAVENOUS; SUBCUTANEOUS ONCE
Qty: 0 | Refills: 0 | Status: COMPLETED | OUTPATIENT
Start: 2018-05-16 | End: 2018-05-16

## 2018-05-16 RX ORDER — SODIUM CHLORIDE 9 MG/ML
1000 INJECTION, SOLUTION INTRAVENOUS
Qty: 0 | Refills: 0 | Status: DISCONTINUED | OUTPATIENT
Start: 2018-05-16 | End: 2018-05-17

## 2018-05-16 RX ORDER — SODIUM CHLORIDE 9 MG/ML
1000 INJECTION INTRAMUSCULAR; INTRAVENOUS; SUBCUTANEOUS ONCE
Qty: 0 | Refills: 0 | Status: DISCONTINUED | OUTPATIENT
Start: 2018-05-16 | End: 2018-05-16

## 2018-05-16 RX ORDER — INSULIN GLARGINE 100 [IU]/ML
46 INJECTION, SOLUTION SUBCUTANEOUS AT BEDTIME
Qty: 0 | Refills: 0 | Status: DISCONTINUED | OUTPATIENT
Start: 2018-05-16 | End: 2018-05-16

## 2018-05-16 RX ORDER — SODIUM CHLORIDE 9 MG/ML
2000 INJECTION INTRAMUSCULAR; INTRAVENOUS; SUBCUTANEOUS ONCE
Qty: 0 | Refills: 0 | Status: COMPLETED | OUTPATIENT
Start: 2018-05-16 | End: 2018-05-16

## 2018-05-16 RX ORDER — INSULIN GLARGINE 100 [IU]/ML
30 INJECTION, SOLUTION SUBCUTANEOUS AT BEDTIME
Qty: 0 | Refills: 0 | Status: DISCONTINUED | OUTPATIENT
Start: 2018-05-16 | End: 2018-05-21

## 2018-05-16 RX ORDER — ASPIRIN/CALCIUM CARB/MAGNESIUM 324 MG
81 TABLET ORAL DAILY
Qty: 0 | Refills: 0 | Status: DISCONTINUED | OUTPATIENT
Start: 2018-05-16 | End: 2018-05-21

## 2018-05-16 RX ORDER — DEXTROSE 50 % IN WATER 50 %
25 SYRINGE (ML) INTRAVENOUS ONCE
Qty: 0 | Refills: 0 | Status: DISCONTINUED | OUTPATIENT
Start: 2018-05-16 | End: 2018-05-17

## 2018-05-16 RX ORDER — METOPROLOL TARTRATE 50 MG
1 TABLET ORAL
Qty: 0 | Refills: 0 | COMMUNITY

## 2018-05-16 RX ORDER — INSULIN LISPRO 100/ML
15 VIAL (ML) SUBCUTANEOUS
Qty: 0 | Refills: 0 | Status: DISCONTINUED | OUTPATIENT
Start: 2018-05-16 | End: 2018-05-19

## 2018-05-16 RX ORDER — PANTOPRAZOLE SODIUM 20 MG/1
40 TABLET, DELAYED RELEASE ORAL
Qty: 0 | Refills: 0 | Status: DISCONTINUED | OUTPATIENT
Start: 2018-05-16 | End: 2018-05-21

## 2018-05-16 RX ORDER — AMPICILLIN SODIUM AND SULBACTAM SODIUM 250; 125 MG/ML; MG/ML
3 INJECTION, POWDER, FOR SUSPENSION INTRAMUSCULAR; INTRAVENOUS EVERY 6 HOURS
Qty: 0 | Refills: 0 | Status: DISCONTINUED | OUTPATIENT
Start: 2018-05-16 | End: 2018-05-21

## 2018-05-16 RX ORDER — ENOXAPARIN SODIUM 100 MG/ML
40 INJECTION SUBCUTANEOUS DAILY
Qty: 0 | Refills: 0 | Status: DISCONTINUED | OUTPATIENT
Start: 2018-05-16 | End: 2018-05-21

## 2018-05-16 RX ORDER — LISINOPRIL 2.5 MG/1
10 TABLET ORAL DAILY
Qty: 0 | Refills: 0 | Status: DISCONTINUED | OUTPATIENT
Start: 2018-05-16 | End: 2018-05-21

## 2018-05-16 RX ORDER — SERTRALINE 25 MG/1
100 TABLET, FILM COATED ORAL DAILY
Qty: 0 | Refills: 0 | Status: DISCONTINUED | OUTPATIENT
Start: 2018-05-16 | End: 2018-05-21

## 2018-05-16 RX ORDER — PIPERACILLIN AND TAZOBACTAM 4; .5 G/20ML; G/20ML
3.38 INJECTION, POWDER, LYOPHILIZED, FOR SOLUTION INTRAVENOUS ONCE
Qty: 0 | Refills: 0 | Status: COMPLETED | OUTPATIENT
Start: 2018-05-16 | End: 2018-05-16

## 2018-05-16 RX ORDER — DEXTROSE 50 % IN WATER 50 %
15 SYRINGE (ML) INTRAVENOUS ONCE
Qty: 0 | Refills: 0 | Status: DISCONTINUED | OUTPATIENT
Start: 2018-05-16 | End: 2018-05-17

## 2018-05-16 RX ORDER — METOPROLOL TARTRATE 50 MG
25 TABLET ORAL DAILY
Qty: 0 | Refills: 0 | Status: DISCONTINUED | OUTPATIENT
Start: 2018-05-16 | End: 2018-05-21

## 2018-05-16 RX ORDER — SODIUM CHLORIDE 9 MG/ML
1000 INJECTION INTRAMUSCULAR; INTRAVENOUS; SUBCUTANEOUS
Qty: 0 | Refills: 0 | Status: DISCONTINUED | OUTPATIENT
Start: 2018-05-16 | End: 2018-05-21

## 2018-05-16 RX ORDER — BENAZEPRIL HYDROCHLORIDE AND HYDROCHLOROTHIAZIDE 10; 12.5 MG/1; MG/1
0 TABLET, FILM COATED ORAL
Qty: 0 | Refills: 0 | COMMUNITY

## 2018-05-16 RX ORDER — DEXTROSE 50 % IN WATER 50 %
12.5 SYRINGE (ML) INTRAVENOUS ONCE
Qty: 0 | Refills: 0 | Status: DISCONTINUED | OUTPATIENT
Start: 2018-05-16 | End: 2018-05-17

## 2018-05-16 RX ORDER — INSULIN LISPRO 100/ML
VIAL (ML) SUBCUTANEOUS
Qty: 0 | Refills: 0 | Status: DISCONTINUED | OUTPATIENT
Start: 2018-05-16 | End: 2018-05-21

## 2018-05-16 RX ORDER — SIMVASTATIN 20 MG/1
10 TABLET, FILM COATED ORAL AT BEDTIME
Qty: 0 | Refills: 0 | Status: DISCONTINUED | OUTPATIENT
Start: 2018-05-16 | End: 2018-05-21

## 2018-05-16 RX ADMIN — Medication 2: at 22:09

## 2018-05-16 RX ADMIN — INSULIN GLARGINE 30 UNIT(S): 100 INJECTION, SOLUTION SUBCUTANEOUS at 22:10

## 2018-05-16 RX ADMIN — SODIUM CHLORIDE 4000 MILLILITER(S): 9 INJECTION INTRAMUSCULAR; INTRAVENOUS; SUBCUTANEOUS at 21:51

## 2018-05-16 RX ADMIN — SODIUM CHLORIDE 2000 MILLILITER(S): 9 INJECTION INTRAMUSCULAR; INTRAVENOUS; SUBCUTANEOUS at 15:30

## 2018-05-16 RX ADMIN — SODIUM CHLORIDE 100 MILLILITER(S): 9 INJECTION INTRAMUSCULAR; INTRAVENOUS; SUBCUTANEOUS at 18:01

## 2018-05-16 RX ADMIN — SIMVASTATIN 10 MILLIGRAM(S): 20 TABLET, FILM COATED ORAL at 22:10

## 2018-05-16 RX ADMIN — PIPERACILLIN AND TAZOBACTAM 200 GRAM(S): 4; .5 INJECTION, POWDER, LYOPHILIZED, FOR SOLUTION INTRAVENOUS at 15:30

## 2018-05-16 NOTE — H&P ADULT - PROBLEM SELECTOR PLAN 1
Chronic foot ulcer  s/p 6 weeks IV abx in March   She was on augmentin since 1 week and cipro since yesterday prior to coming in  XR- negative  - Podiatry consulted  - f/u ESR, CRP  - Tight blood sugar control  - Will start Unasyn  - f/u lactate  - ID- Dr Boo

## 2018-05-16 NOTE — H&P ADULT - HISTORY OF PRESENT ILLNESS
54 y/o F pt with PMHx of DM, Diabetic Foot Ulcer (bilateral; for several years) and HTN and PSHx of Anal Fissurectomy, Carpal Tunnel Release, and  presents to ED c/o b/l diabetic foot ulcers 52 y/o F pt with PMHx of DM, Diabetic Foot Ulcer (bilateral; for several years), HTN and PSHx of Anal Fissurectomy, Carpal Tunnel Release, and  presents to ED c/o b/l diabetic foot ulcers . Patient states having chronic b/l foot ulcer, on L big toe and R fourth toe. She was admitted here in March and was treated for osteomyelitis. She was discharged with PICC line to continue 6 weeks of abx and had outpatient followup with her podiatrist. She currently denies fever, pain or worsening discharge. She did notice increase swelling and erythema around the wound. Her podiatry started her on oral augmentin 1 week ago and added cipro yesterday for suspicion of infection. Patient states that she went to see her PCP for vitamin B12 shot when he did UA and saw ketones in urine along with blood glucose of 337 so advised her to come to the ED. Patient denies polyuria, and polydipsia. She said she recently had surgery on her right hand for displaced bones? that were fixed with pins. Due to the surgery on her dominant hand she is having trouble injecting insulin.     SH: Denies smoking, alcohol or illicit drug use. Ambulates independently.   Allergies: Iodine- hives

## 2018-05-16 NOTE — CONSULT NOTE ADULT - ASSESSMENT
A 53y year old Female with poorly  controlled Diabetes, right DFU with OM s/p 6 weeks IV ABx in Feb,2018, has been sent in to the ER from PMD's office for evaluation of Left great toe cellulitis and ketones in her urine. She has no fever or chills. The Xray of B/L foot shows no bony destruction. The ID consult requested to assist with further evaluation and antibiotic management.      # Left Great toe DFU with cellulitis  # Poorly controlled DM2    Would recommend:  1. Follow up Blood cultures  2. Continue Unasyn and ADD Clindamycin  3. Management of DFU as per Podiatry  4. Management of Diabetes as per Primary team    d/w Patient, Dr. Christine and Admitting resident    will follow the patient with you and make further recommendation based on the clinical course and Lab results  Thank you for the opportunity to participate in Ms. CRAIG's care

## 2018-05-16 NOTE — ED PROVIDER NOTE - OBJECTIVE STATEMENT
54 y/o F pt with PMHx of DM, Diabetic Foot Ulcer (bilateral; for several years) and HTN and PSHx of Anal Fissurectomy, Carpal Tunnel Release, and  presents to ED c/o b/l diabetic foot ulcers for several years that recently worsened. Pt reports she has currently been taking Augmentin and Cipro for ulcers on b/l feet. Pt states her blood sugar has been elevated (hyperglycemia); pt f/u with her PMD today, and was ultimately sent to the ED because there were ketones in pt's urine. Pt denies fever, chills, or any other complaints. Allergies: Iodinated Radiocontrast Agents (hives).

## 2018-05-16 NOTE — H&P ADULT - PROBLEM SELECTOR PLAN 5
[] Previous VTE                                                3  [] Thrombophilia                                             2  [] Lower limb paralysis                                   2    [] Current Cancer                                             2   [X] Immobilization > 24 hrs                              1  [] ICU/CCU stay > 24 hours                             1  [] Age > 60                                                         1    IMPROVE VTE Score: 1  Lovenox

## 2018-05-16 NOTE — H&P ADULT - NSHPLABSRESULTS_GEN_ALL_CORE
14.0   10.6  )-----------( 278      ( 16 May 2018 14:11 )             44.6       05-16    139  |  104  |  14  ----------------------------<  167<H>  3.7   |  26  |  0.91    Ca    9.4      16 May 2018 14:11    TPro  7.8  /  Alb  3.4<L>  /  TBili  0.8  /  DBili  x   /  AST  20  /  ALT  25  /  AlkPhos  101  05-16              Urinalysis Basic - ( 16 May 2018 14:29 )    Color: Yellow / Appearance: Clear / S.025 / pH: x  Gluc: x / Ketone: Negative  / Bili: Negative / Urobili: Negative   Blood: x / Protein: Negative / Nitrite: Negative   Leuk Esterase: Trace / RBC: 0-2 /HPF / WBC 0-2 /HPF   Sq Epi: x / Non Sq Epi: Moderate /HPF / Bacteria: Few /HPF        PT/INR - ( 16 May 2018 14:11 )   PT: 10.9 sec;   INR: 1.00 ratio         PTT - ( 16 May 2018 14:11 )  PTT:28.9 sec    Lactate Trend   @ 14:11 Lactate:2.4             CAPILLARY BLOOD GLUCOSE      POCT Blood Glucose.: 204 mg/dL (16 May 2018 12:34)

## 2018-05-16 NOTE — CONSULT NOTE ADULT - SUBJECTIVE AND OBJECTIVE BOX
S : 53y year old Female seen at bedside for bilateral digital ulcerations. Patient was previously evaluated in the hospital in March, discharged on PICC line for OM and followed up with private Podiatrist. Patient states she has been applying santyl and DSD to ulceration sites. Recently Podiatrist placed her on cipro/augmentin due to non healing ulcer and increased erythema and edema in the left hallux. Patient states she was at PMD today who states she has ketones in her urine and told her to come to ED. Patient has been struggling with insulin ever since right hand surgery as that is her domianant hand to give insulin and family members are not around at times.     Chief Complaint : Patient is a 53y old  Female who presents with a chief complaint of bilateral digital ulcerations     Patient admits to  (-) Fevers, (-) Chills, (-) Nausea, (-) Vomiting, (-) Shortness of Breath      PMH: Diabetic foot ulcer  HTN (hypertension)  Diabetes    PSH:S/P carpal tunnel release  S/P anal fissurectomy  S/P       Allergies:iodinated radiocontrast agents (Hives)      Labs:                          14.0   10.6  )-----------( 278      ( 16 May 2018 14:11 )             44.6     WBC Trend  10.6<H> Date ( @ 14:11)      Chem      139  |  104  |  14  ----------------------------<  167<H>  3.7   |  26  |  0.91    Ca    9.4      16 May 2018 14:11    TPro  7.8  /  Alb  3.4<L>  /  TBili  0.8  /  DBili  x   /  AST  20  /  ALT  25  /  AlkPhos  101            T(F): 98.8 (18 @ 12:35), Max: 98.8 (18 @ 12:35)  HR: 82 (18 @ 12:35) (82 - 86)  BP: 116/82 (18 @ 12:35) (116/82 - 126/78)  RR: 18 (18 @ 12:35) (18 - 18)  SpO2: 98% (05-16-18 @ 12:35) (97% - 98%)  Wt(kg): --    O:   General: Pleasant  female NAD & AOX3.    Integument:  Skin warm, dry and supple bilateral.    Ulceration Left Hallux distal plantar: granular in nature, - purulence - malodor +edema +erythema around hallux  Ulceration Right distal digital 3rd toe: small lesion, fibrotic in nature, negative signs of infection  Ulceration Right distal digital 4th toe: granular in nature, no signs of infection, presence of edema   Vascular: Dorsalis Pedis and Posterior Tibial pulses 2/4.  Capillary re-fill time less then 3 seconds digits 1-5 bilateral.    Neuro: Protective sensation diminished to the level of the digits bilateral.  MSK: Muscle strength 5/5 all major muscle groups bilateral.    Deformity:  A: Bilateral distal digital ulcerations     P:   Chart reviewed and Patient evaluated  Discussed diagnosis and treatment with patient  Wound dressed with DSD  X-rays reviewed  SANDHYA reviewed   Ordered ESR, CRP, depending on results will consider MRI   Order santyl once on the floors  Continue IV antibiotics as per ID, will trend WBC   No weight bearing restrictions BL   Discussed with Dr. Suggs

## 2018-05-16 NOTE — H&P ADULT - NSHPREVIEWOFSYSTEMS_GEN_ALL_CORE
REVIEW OF SYSTEMS:  CONSTITUTIONAL: No fever, weight loss, or fatigue  EYES: No eye pain, visual disturbances, or discharge  ENMT:  No difficulty hearing, tinnitus, vertigo; No sinus or throat pain  RESPIRATORY: No cough, wheezing, chills or hemoptysis; No shortness of breath  CARDIOVASCULAR: No chest pain, palpitations, dizziness, or leg swelling  GASTROINTESTINAL: No abdominal or epigastric pain. No nausea, vomiting, or hematemesis; No diarrhea or constipation. No melena or hematochezia.  GENITOURINARY: No dysuria, frequency, hematuria, or incontinence  NEUROLOGICAL: No headaches, memory loss, loss of strength, numbness, or tremors  ENDOCRINE: No heat or cold intolerance; No hair loss  MUSCULOSKELETAL: L great toe ulcer and R 4th toe ulcer  HEME/LYMPH: No easy bruising, or bleeding gums  ALLERY AND IMMUNOLOGIC: No hives or eczema

## 2018-05-16 NOTE — H&P ADULT - ASSESSMENT
52 y/o F pt with PMHx of DM, Diabetic Foot Ulcer (bilateral; for several years), HTN and PSHx of Anal Fissurectomy, Carpal Tunnel Release, and  presents to ED c/o nonhealing b/l diabetic foot ulcers and hyperglycemia.

## 2018-05-16 NOTE — ED PROVIDER NOTE - CONDUCTED A DETAILED DISCUSSION WITH PATIENT AND/OR GUARDIAN REGARDING, MDM
lab results/need for outpatient follow-up/radiology results need to admit/lab results/radiology results/need for outpatient follow-up

## 2018-05-16 NOTE — CONSULT NOTE ADULT - SUBJECTIVE AND OBJECTIVE BOX
3y year old Female seen at bedside for bilateral digital ulcerations. Patient was previously evaluated in the hospital in March, discharged on PICC line for OM and followed up with private Podiatrist. Patient states she has been applying santyl and DSD to ulceration sites. Recently Podiatrist placed her on cipro/augmentin due to non healing ulcer and increased erythema and edema in the left hallux. Patient states she was at PMD today who states she has ketones in her urine and told her to come to ED. Patient has been struggling with insulin ever since right hand surgery as that is her domianant hand to give insulin and family members are not around at times.       Patient is a 53y old  Female who presents with a chief complaint of     INTERVAL HPI/OVERNIGHT EVENTS:  T(C): 37.1 (18 @ 12:35), Max: 37.1 (18 @ 12:35)  HR: 82 (18 @ 12:35) (82 - 86)  BP: 116/82 (18 @ 12:35) (116/82 - 126/78)  RR: 18 (18 @ 12:35) (18 - 18)  SpO2: 98% (18 @ 12:35) (97% - 98%)  Wt(kg): --  I&O's Summary      PAST MEDICAL & SURGICAL HISTORY:  Diabetic foot ulcer  HTN (hypertension)  Diabetes  S/P carpal tunnel release  S/P anal fissurectomy  S/P       SOCIAL HISTORY  Alcohol:  Tobacco:  Illicit substance use:      FAMILY HISTORY:      LABS:                        14.0   10.6  )-----------( 278      ( 16 May 2018 14:11 )             44.6     05-16    139  |  104  |  14  ----------------------------<  167<H>  3.7   |  26  |  0.91    Ca    9.4      16 May 2018 14:11    TPro  7.8  /  Alb  3.4<L>  /  TBili  0.8  /  DBili  x   /  AST  20  /  ALT  25  /  AlkPhos  101  05-    PT/INR - ( 16 May 2018 14:11 )   PT: 10.9 sec;   INR: 1.00 ratio         PTT - ( 16 May 2018 14:11 )  PTT:28.9 sec  Urinalysis Basic - ( 16 May 2018 14:29 )    Color: Yellow / Appearance: Clear / S.025 / pH: x  Gluc: x / Ketone: Negative  / Bili: Negative / Urobili: Negative   Blood: x / Protein: Negative / Nitrite: Negative   Leuk Esterase: Trace / RBC: 0-2 /HPF / WBC 0-2 /HPF   Sq Epi: x / Non Sq Epi: Moderate /HPF / Bacteria: Few /HPF      CAPILLARY BLOOD GLUCOSE      POCT Blood Glucose.: 204 mg/dL (16 May 2018 12:34)  POCT Blood Glucose.: 224 mg/dL (16 May 2018 12:15)        Urinalysis Basic - ( 16 May 2018 14:29 )    Color: Yellow / Appearance: Clear / S.025 / pH: x  Gluc: x / Ketone: Negative  / Bili: Negative / Urobili: Negative   Blood: x / Protein: Negative / Nitrite: Negative   Leuk Esterase: Trace / RBC: 0-2 /HPF / WBC 0-2 /HPF   Sq Epi: x / Non Sq Epi: Moderate /HPF / Bacteria: Few /HPF        MEDICATIONS  (STANDING):  sodium chloride 0.9%. 1000 milliLiter(s) (100 mL/Hr) IV Continuous <Continuous>    MEDICATIONS  (PRN):      REVIEW OF SYSTEMS:  CONSTITUTIONAL: No fever, weight loss, or fatigue  EYES: No eye pain, visual disturbances, or discharge  ENMT:  No difficulty hearing, tinnitus, vertigo; No sinus or throat pain  NECK: No pain or stiffness  RESPIRATORY: No cough, wheezing, chills or hemoptysis; No shortness of breath  CARDIOVASCULAR: No chest pain, palpitations, dizziness, or leg swelling  GASTROINTESTINAL: No abdominal or epigastric pain. No nausea, vomiting, or hematemesis; No diarrhea or constipation. No melena or hematochezia.  GENITOURINARY: No dysuria, frequency, hematuria, or incontinence  NEUROLOGICAL: No headaches, memory loss, loss of strength, numbness, or tremors  SKIN: No itching, burning, rashes, or lesions   LYMPH NODES: No enlarged glands  ENDOCRINE: No heat or cold intolerance; No hair loss  MUSCULOSKELETAL: No joint pain or swelling; No muscle, back, or extremity pain  PSYCHIATRIC: No depression, anxiety, mood swings, or difficulty sleeping  HEME/LYMPH: No easy bruising, or bleeding gums  ALLERY AND IMMUNOLOGIC: No hives or eczema    RADIOLOGY & ADDITIONAL TESTS:    Imaging Personally Reviewed:  [ ] YES  [ ] NO    Consultant(s) Notes Reviewed:  [ ] YES  [ ] NO    PHYSICAL EXAM:  GENERAL: NAD, well-groomed, well-developed  HEAD:  Atraumatic, Normocephalic  EYES: EOMI, PERRLA, conjunctiva and sclera clear  ENMT: No tonsillar erythema, exudates, or enlargement; Moist mucous membranes, Good dentition, No lesions  NECK: Supple, No JVD, Normal thyroid  NERVOUS SYSTEM:  Alert & Oriented X3, Good concentration; Motor Strength 5/5 B/L upper and lower extremities; DTRs 2+ intact and symmetric  CHEST/LUNG: Clear to percussion bilaterally; No rales, rhonchi, wheezing, or rubs  HEART: Regular rate and rhythm; No murmurs, rubs, or gallops  ABDOMEN: Soft, Nontender, Nondistended; Bowel sounds present  EXTREMITIES:  2+ Peripheral Pulses, No clubbing, cyanosis, or edema  LYMPH: No lymphadenopathy noted  SKIN: No rashes or lesions    Care Discussed with Consultants/Other Providers [ ] YES  [ ] NO A 53y year old Female with poorly  controlled Diabetes, right DFU with OM s/p 6 weeks IV ABx in , has been sent in to the ER from PMD's office for evaluation of Left great toe cellulitis and ketones in her urine. She has no fever or chills. The Xray of B/L foot shows no bony destruction. The ID consult requested to assist with further evaluation and antibiotic management.            REVIEW OF SYSTEMS: Total of twelve systems have been reviewed with patient and found to be negative unless mentioned in HPI        PAST MEDICAL & SURGICAL HISTORY:  Diabetic foot ulcer  HTN (hypertension)  Diabetes  S/P carpal tunnel release  S/P anal fissurectomy  S/P         SOCIAL HISTORY  Alcohol: Does not drink  Tobacco: Does not smoke  Illicit substance use: None        FAMILY HISTORY: Non contributory to the present illness        ALLERGIES: Iodine/contrast        T(C): 37.1 (18 @ 12:35), Max: 37.1 (18 @ 12:35)  HR: 82 (18 @ 12:35) (82 - 86)  BP: 116/82 (18 @ 12:35) (116/82 - 126/78)  RR: 18 (18 @ 12:35) (18 - 18)  SpO2: 98% (18 @ 12:35) (97% - 98%)  Wt(kg): --  I&O's Summary        PHYSICAL EXAM:  GENERAL: Not in distress  CVS: s1 and s2 present  RESP: Air entry B/L  GI: abdomen soft and nontender  EXT: Left great toe swollen, erythematous with clean ulcer  CNS: AAOX3          LABS:                        14.0   10.6  )-----------( 278      ( 16 May 2018 14:11 )             44.6     -    139  |  104  |  14  ----------------------------<  167<H>  3.7   |  26  |  0.91    Ca    9.4      16 May 2018 14:11    TPro  7.8  /  Alb  3.4<L>  /  TBili  0.8  /  DBili  x   /  AST  20  /  ALT  25  /  AlkPhos  101  -    PT/INR - ( 16 May 2018 14:11 )   PT: 10.9 sec;   INR: 1.00 ratio         PTT - ( 16 May 2018 14:11 )  PTT:28.9 sec  Urinalysis Basic - ( 16 May 2018 14:29 )    Color: Yellow / Appearance: Clear / S.025 / pH: x  Gluc: x / Ketone: Negative  / Bili: Negative / Urobili: Negative   Blood: x / Protein: Negative / Nitrite: Negative   Leuk Esterase: Trace / RBC: 0-2 /HPF / WBC 0-2 /HPF   Sq Epi: x / Non Sq Epi: Moderate /HPF / Bacteria: Few /HPF      CAPILLARY BLOOD GLUCOSE      POCT Blood Glucose.: 204 mg/dL (16 May 2018 12:34)  POCT Blood Glucose.: 224 mg/dL (16 May 2018 12:15)              MEDICATIONS  (STANDING):  sodium chloride 0.9%. 1000 milliLiter(s) (100 mL/Hr) IV Continuous <Continuous>    MEDICATIONS  (PRN):          RADIOLOGY & ADDITIONAL TESTS:    18: Xray Foot AP + Lateral + Oblique, Bilat (18 @ 14:42)  No evidence of bony destruction.

## 2018-05-16 NOTE — CONSULT NOTE ADULT - SUBJECTIVE AND OBJECTIVE BOX
HPI:  52 y/o F pt with PMHx of DM, Diabetic Foot Ulcer (bilateral; for several years), HTN and PSHx of Anal Fissurectomy, Carpal Tunnel Release, and  presents to ED c/o b/l diabetic foot ulcers . Patient states having chronic b/l foot ulcer, on L big toe and R fourth toe. She was admitted here in March and was treated for osteomyelitis. She was discharged with PICC line to continue 6 weeks of abx and had outpatient followup with her podiatrist. She currently denies fever, pain or worsening discharge. She did notice increase swelling and erythema around the wound. Her podiatry started her on oral augmentin 1 week ago and added cipro yesterday for suspicion of infection. Patient states that she went to see her PCP for vitamin B12 shot when he did UA and saw ketones in urine along with blood glucose of 337 so advised her to come to the ED. Patient denies polyuria, and polydipsia. She said she recently had surgery on her right hand for displaced bones? that were fixed with pins. Due to the surgery on her dominant hand she is having trouble injecting insulin.     SH: Denies smoking, alcohol or illicit drug use. Ambulates independently.   Allergies: Iodine- hives (16 May 2018 17:41)      PAST MEDICAL & SURGICAL HISTORY:  Diabetic foot ulcer  HTN (hypertension)  Diabetes  S/P carpal tunnel release  S/P anal fissurectomy  S/P       iodinated radiocontrast agents (Hives)      ampicillin/sulbactam  IVPB 3 Gram(s) IV Intermittent every 6 hours  aspirin enteric coated 81 milliGRAM(s) Oral daily  dextrose 40% Gel 15 Gram(s) Oral once PRN  dextrose 5%. 1000 milliLiter(s) IV Continuous <Continuous>  dextrose 50% Injectable 12.5 Gram(s) IV Push once  dextrose 50% Injectable 25 Gram(s) IV Push once  dextrose 50% Injectable 25 Gram(s) IV Push once  enoxaparin Injectable 40 milliGRAM(s) SubCutaneous daily  glucagon  Injectable 1 milliGRAM(s) IntraMuscular once PRN  hydrochlorothiazide 12.5 milliGRAM(s) Oral daily  insulin glargine Injectable (LANTUS) 30 Unit(s) SubCutaneous at bedtime  insulin lispro (HumaLOG) corrective regimen sliding scale   SubCutaneous three times a day before meals  insulin lispro Injectable (HumaLOG) 15 Unit(s) SubCutaneous three times a day before meals  lisinopril 10 milliGRAM(s) Oral daily  metoprolol succinate ER 25 milliGRAM(s) Oral daily  pantoprazole    Tablet 40 milliGRAM(s) Oral before breakfast  sertraline 100 milliGRAM(s) Oral daily  simvastatin 10 milliGRAM(s) Oral at bedtime  sodium chloride 0.9%. 1000 milliLiter(s) IV Continuous <Continuous>      Social Hx:    FAMILY HISTORY:  Family history of lung cancer  Family history of diabetes mellitus      REVIEW OF SYSTEMS:    CONSTITUTIONAL: No weakness, fevers or chills  EYES/ENT: No visual changes;  No vertigo or throat pain   NECK: No pain or stiffness  RESPIRATORY: No cough, wheezing, hemoptysis; No shortness of breath  CARDIOVASCULAR: No chest pain or palpitations  GASTROINTESTINAL: No abdominal or epigastric pain. No nausea, vomiting, or hematemesis; No diarrhea or constipation. No melena or hematochezia.  GENITOURINARY: No dysuria, frequency or hematuria  NEUROLOGICAL: No numbness or weakness  SKIN: No itching, burning, rashes, or lesions   All other review of systems is negative unless indicated above.  PHYSICAL EXAM:    Vital Signs Last 24 Hrs  T(C): 37.1 (16 May 2018 12:35), Max: 37.1 (16 May 2018 12:35)  T(F): 98.8 (16 May 2018 12:35), Max: 98.8 (16 May 2018 12:35)  HR: 82 (16 May 2018 12:35) (82 - 86)  BP: 116/82 (16 May 2018 12:35) (116/82 - 126/78)  BP(mean): --  RR: 18 (16 May 2018 12:35) (18 - 18)  SpO2: 98% (16 May 2018 12:35) (97% - 98%)    Constitutional:    HEENT:    Neck:  [  ] Supple  [  ]Lymphadenopathy  [  ] JVD   [  ]No JVD  [  ] Masses   [  ] WNL    CHEST/Respiratory:  [  ] Rales      [  ] Rhonchi      [  ] Wheezing       [  ] Chest Tenderness  [  ]Clear to auscultation    Cardiovascular:  [  ]S1 S2  [  ] Reg  [  ] Irreg   [  ] Murmurs  [  ]Systolic [  ]Diastolic  [  ]No Murmur    Abdomen:  [  ]  Bowel Sounds   [  ] Soft           [  ] ABD Distention  [  ] Tenderness  [  ] Organomegaly                        [  ] Guarding Rigidity  [  ] No Guarding Rigidity  [  ] Rebound Tenderness [  ] No Rebound Tenderness    Extremities: [  ] Edema  [  ] No edema  [  ] Clubbing   [  ] Cyanosis  [  ] Palpable peripheral pulses                        [  ] No Tender Calf muscles  [  ] Tender Calf muscles    Neurological:  [  ] Alert  [  ] Awake  [  ] Oriented  x                              [  ] Confused    Skin:  [  ] Thrombophlebitis  [  ] Rashes  [  ] Dry  [  ] Ulcers    Ortho:  [  ] Joint Swelling  [  ] Joint erythema [  ] DJD [  ] Increased temp. to touch      LABS/DIAGNOSTIC TESTS                          14.0   10.6  )-----------( 278      ( 16 May 2018 14:11 )             44.6     WBC Count: 10.6 K/uL ( @ 14:11)          139  |  104  |  14  ----------------------------<  167<H>  3.7   |  26  |  0.91    Ca    9.4      16 May 2018 14:11    TPro  7.8  /  Alb  3.4<L>  /  TBili  0.8  /  DBili  x   /  AST  20  /  ALT  25  /  AlkPhos  101        Urinalysis Basic - ( 16 May 2018 14:29 )    Color: Yellow / Appearance: Clear / S.025 / pH: x  Gluc: x / Ketone: Negative  / Bili: Negative / Urobili: Negative   Blood: x / Protein: Negative / Nitrite: Negative   Leuk Esterase: Trace / RBC: 0-2 /HPF / WBC 0-2 /HPF   Sq Epi: x / Non Sq Epi: Moderate /HPF / Bacteria: Few /HPF        LIVER FUNCTIONS - ( 16 May 2018 14:11 )  Alb: 3.4 g/dL / Pro: 7.8 g/dL / ALK PHOS: 101 U/L / ALT: 25 U/L DA / AST: 20 U/L / GGT: x             PT/INR - ( 16 May 2018 14:11 )   PT: 10.9 sec;   INR: 1.00 ratio         PTT - ( 16 May 2018 14:11 )  PTT:28.9 sec    LACTATE:Lactate, Blood: 2.4 mmol/L ( @ 18:36)  Lactate, Blood: 2.4 mmol/L ( @ 14:11)        CULTURES:   ampicillin/sulbactam  IVPB 3 Gram(s) IV Intermittent every 6 hours  aspirin enteric coated 81 milliGRAM(s) Oral daily  dextrose 40% Gel 15 Gram(s) Oral once PRN  dextrose 5%. 1000 milliLiter(s) IV Continuous <Continuous>  dextrose 50% Injectable 12.5 Gram(s) IV Push once  dextrose 50% Injectable 25 Gram(s) IV Push once  dextrose 50% Injectable 25 Gram(s) IV Push once  enoxaparin Injectable 40 milliGRAM(s) SubCutaneous daily  glucagon  Injectable 1 milliGRAM(s) IntraMuscular once PRN  hydrochlorothiazide 12.5 milliGRAM(s) Oral daily  insulin glargine Injectable (LANTUS) 30 Unit(s) SubCutaneous at bedtime  insulin lispro (HumaLOG) corrective regimen sliding scale   SubCutaneous three times a day before meals  insulin lispro Injectable (HumaLOG) 15 Unit(s) SubCutaneous three times a day before meals  lisinopril 10 milliGRAM(s) Oral daily  metoprolol succinate ER 25 milliGRAM(s) Oral daily  pantoprazole    Tablet 40 milliGRAM(s) Oral before breakfast  sertraline 100 milliGRAM(s) Oral daily  simvastatin 10 milliGRAM(s) Oral at bedtime  sodium chloride 0.9%. 1000 milliLiter(s) IV Continuous <Continuous>      RADIOLOGY    CXR:

## 2018-05-16 NOTE — CONSULT NOTE ADULT - ASSESSMENT
1.uncontrolled diabetes  with foot ulcer/infection  restart lantus/humalog  aic  will follow and adjust 1.uncontrolled diabetes  with foot ulcer/infection  restart lantus/humalog  pt also on metformin/trulicity held  jardiance tried in recent past  aic  will follow and adjust

## 2018-05-16 NOTE — ED PROVIDER NOTE - MEDICAL DECISION MAKING DETAILS
54 y/o F pt presents with worsening b/l diabetic foot ulcers. Will send labs, consult podiatry, and will reassess. Pt failed PO treatment for b/l foot ulcers; will likely admit.

## 2018-05-16 NOTE — H&P ADULT - NSHPPHYSICALEXAM_GEN_ALL_CORE
Vital Signs Last 24 Hrs  T(C): 37.1 (16 May 2018 12:35), Max: 37.1 (16 May 2018 12:35)  T(F): 98.8 (16 May 2018 12:35), Max: 98.8 (16 May 2018 12:35)  HR: 82 (16 May 2018 12:35) (82 - 86)  BP: 116/82 (16 May 2018 12:35) (116/82 - 126/78)  BP(mean): --  RR: 18 (16 May 2018 12:35) (18 - 18)  SpO2: 98% (16 May 2018 12:35) (97% - 98%)    GENERAL: NAD  HEAD:  Atraumatic, Normocephalic  EYES: EOMI, PERRLA, conjunctiva and sclera clear  ENMT: Moist mucous membranes  NECK: Supple  NERVOUS SYSTEM:  Alert & Oriented X3  CHEST/LUNG: Clear to auscultation bilaterally; No rales, rhonchi, wheezing, or rubs  HEART: Regular rate and rhythm; No murmurs, rubs, or gallops  ABDOMEN: Soft, Nontender, Nondistended; Bowel sounds present  EXTREMITIES:  2+ Peripheral Pulses, left halux ulcer with hyperkeratotic borders and sorrounding edema and erythema, no purulence. R 4th toe hyperkeratotic ucler, no signs of infection

## 2018-05-16 NOTE — ED ADULT NURSE NOTE - OBJECTIVE STATEMENT
Pt AOx3, ambulatory, c/o BL feet diabetic ulcers x several years, worsening, sent from PCP for ketones in urine. Pt denies n/v/fevers/chills. No dizziness, no other . Pt p/w right arm sling from recent fall, pt denies changes in LOC. Pt ambulating independent no difficulty walking noted

## 2018-05-17 LAB
ANION GAP SERPL CALC-SCNC: 11 MMOL/L — SIGNIFICANT CHANGE UP (ref 5–17)
BASOPHILS # BLD AUTO: 0.1 K/UL — SIGNIFICANT CHANGE UP (ref 0–0.2)
BASOPHILS NFR BLD AUTO: 1.1 % — SIGNIFICANT CHANGE UP (ref 0–2)
BUN SERPL-MCNC: 12 MG/DL — SIGNIFICANT CHANGE UP (ref 7–18)
CALCIUM SERPL-MCNC: 8.6 MG/DL — SIGNIFICANT CHANGE UP (ref 8.4–10.5)
CHLORIDE SERPL-SCNC: 108 MMOL/L — SIGNIFICANT CHANGE UP (ref 96–108)
CO2 SERPL-SCNC: 23 MMOL/L — SIGNIFICANT CHANGE UP (ref 22–31)
CREAT SERPL-MCNC: 0.79 MG/DL — SIGNIFICANT CHANGE UP (ref 0.5–1.3)
CRP SERPL-MCNC: 0.7 MG/DL — HIGH (ref 0–0.4)
EOSINOPHIL # BLD AUTO: 0.8 K/UL — HIGH (ref 0–0.5)
EOSINOPHIL NFR BLD AUTO: 9.5 % — HIGH (ref 0–6)
ERYTHROCYTE [SEDIMENTATION RATE] IN BLOOD: 28 MM/HR — HIGH (ref 0–20)
GLUCOSE BLDC GLUCOMTR-MCNC: 108 MG/DL — HIGH (ref 70–99)
GLUCOSE BLDC GLUCOMTR-MCNC: 123 MG/DL — HIGH (ref 70–99)
GLUCOSE BLDC GLUCOMTR-MCNC: 127 MG/DL — HIGH (ref 70–99)
GLUCOSE BLDC GLUCOMTR-MCNC: 152 MG/DL — HIGH (ref 70–99)
GLUCOSE SERPL-MCNC: 149 MG/DL — HIGH (ref 70–99)
HBA1C BLD-MCNC: 12 % — HIGH (ref 4–5.6)
HCT VFR BLD CALC: 40.7 % — SIGNIFICANT CHANGE UP (ref 34.5–45)
HGB BLD-MCNC: 12.9 G/DL — SIGNIFICANT CHANGE UP (ref 11.5–15.5)
LACTATE SERPL-SCNC: 1.8 MMOL/L — SIGNIFICANT CHANGE UP (ref 0.7–2)
LYMPHOCYTES # BLD AUTO: 2.2 K/UL — SIGNIFICANT CHANGE UP (ref 1–3.3)
LYMPHOCYTES # BLD AUTO: 25.7 % — SIGNIFICANT CHANGE UP (ref 13–44)
MCHC RBC-ENTMCNC: 27.8 PG — SIGNIFICANT CHANGE UP (ref 27–34)
MCHC RBC-ENTMCNC: 31.7 GM/DL — LOW (ref 32–36)
MCV RBC AUTO: 87.6 FL — SIGNIFICANT CHANGE UP (ref 80–100)
MONOCYTES # BLD AUTO: 0.6 K/UL — SIGNIFICANT CHANGE UP (ref 0–0.9)
MONOCYTES NFR BLD AUTO: 7.5 % — SIGNIFICANT CHANGE UP (ref 2–14)
NEUTROPHILS # BLD AUTO: 4.7 K/UL — SIGNIFICANT CHANGE UP (ref 1.8–7.4)
NEUTROPHILS NFR BLD AUTO: 56.2 % — SIGNIFICANT CHANGE UP (ref 43–77)
PLATELET # BLD AUTO: 241 K/UL — SIGNIFICANT CHANGE UP (ref 150–400)
POTASSIUM SERPL-MCNC: 3.6 MMOL/L — SIGNIFICANT CHANGE UP (ref 3.5–5.3)
POTASSIUM SERPL-SCNC: 3.6 MMOL/L — SIGNIFICANT CHANGE UP (ref 3.5–5.3)
RBC # BLD: 4.65 M/UL — SIGNIFICANT CHANGE UP (ref 3.8–5.2)
RBC # FLD: 12.9 % — SIGNIFICANT CHANGE UP (ref 10.3–14.5)
SODIUM SERPL-SCNC: 142 MMOL/L — SIGNIFICANT CHANGE UP (ref 135–145)
WBC # BLD: 8.4 K/UL — SIGNIFICANT CHANGE UP (ref 3.8–10.5)
WBC # FLD AUTO: 8.4 K/UL — SIGNIFICANT CHANGE UP (ref 3.8–10.5)

## 2018-05-17 RX ORDER — TRAMADOL HYDROCHLORIDE 50 MG/1
25 TABLET ORAL EVERY 6 HOURS
Qty: 0 | Refills: 0 | Status: DISCONTINUED | OUTPATIENT
Start: 2018-05-17 | End: 2018-05-21

## 2018-05-17 RX ORDER — ACETAMINOPHEN 500 MG
650 TABLET ORAL EVERY 6 HOURS
Qty: 0 | Refills: 0 | Status: DISCONTINUED | OUTPATIENT
Start: 2018-05-17 | End: 2018-05-21

## 2018-05-17 RX ORDER — COLLAGENASE CLOSTRIDIUM HIST. 250 UNIT/G
1 OINTMENT (GRAM) TOPICAL ONCE
Qty: 0 | Refills: 0 | Status: DISCONTINUED | OUTPATIENT
Start: 2018-05-17 | End: 2018-05-21

## 2018-05-17 RX ADMIN — Medication 100 MILLIGRAM(S): at 21:33

## 2018-05-17 RX ADMIN — SERTRALINE 100 MILLIGRAM(S): 25 TABLET, FILM COATED ORAL at 12:17

## 2018-05-17 RX ADMIN — LISINOPRIL 10 MILLIGRAM(S): 2.5 TABLET ORAL at 05:44

## 2018-05-17 RX ADMIN — SIMVASTATIN 10 MILLIGRAM(S): 20 TABLET, FILM COATED ORAL at 21:33

## 2018-05-17 RX ADMIN — Medication 100 MILLIGRAM(S): at 13:56

## 2018-05-17 RX ADMIN — ENOXAPARIN SODIUM 40 MILLIGRAM(S): 100 INJECTION SUBCUTANEOUS at 12:16

## 2018-05-17 RX ADMIN — AMPICILLIN SODIUM AND SULBACTAM SODIUM 500 GRAM(S): 250; 125 INJECTION, POWDER, FOR SUSPENSION INTRAMUSCULAR; INTRAVENOUS at 12:17

## 2018-05-17 RX ADMIN — TRAMADOL HYDROCHLORIDE 25 MILLIGRAM(S): 50 TABLET ORAL at 22:00

## 2018-05-17 RX ADMIN — INSULIN GLARGINE 30 UNIT(S): 100 INJECTION, SOLUTION SUBCUTANEOUS at 21:33

## 2018-05-17 RX ADMIN — Medication 100 MILLIGRAM(S): at 05:43

## 2018-05-17 RX ADMIN — Medication 81 MILLIGRAM(S): at 12:17

## 2018-05-17 RX ADMIN — AMPICILLIN SODIUM AND SULBACTAM SODIUM 200 GRAM(S): 250; 125 INJECTION, POWDER, FOR SUSPENSION INTRAMUSCULAR; INTRAVENOUS at 05:44

## 2018-05-17 RX ADMIN — Medication 15 UNIT(S): at 12:18

## 2018-05-17 RX ADMIN — PANTOPRAZOLE SODIUM 40 MILLIGRAM(S): 20 TABLET, DELAYED RELEASE ORAL at 05:44

## 2018-05-17 RX ADMIN — Medication 12.5 MILLIGRAM(S): at 05:44

## 2018-05-17 RX ADMIN — AMPICILLIN SODIUM AND SULBACTAM SODIUM 500 GRAM(S): 250; 125 INJECTION, POWDER, FOR SUSPENSION INTRAMUSCULAR; INTRAVENOUS at 17:12

## 2018-05-17 RX ADMIN — TRAMADOL HYDROCHLORIDE 25 MILLIGRAM(S): 50 TABLET ORAL at 14:43

## 2018-05-17 RX ADMIN — Medication 15 UNIT(S): at 08:48

## 2018-05-17 RX ADMIN — AMPICILLIN SODIUM AND SULBACTAM SODIUM 200 GRAM(S): 250; 125 INJECTION, POWDER, FOR SUSPENSION INTRAMUSCULAR; INTRAVENOUS at 00:02

## 2018-05-17 RX ADMIN — Medication 15 UNIT(S): at 17:12

## 2018-05-17 RX ADMIN — Medication 650 MILLIGRAM(S): at 12:17

## 2018-05-17 RX ADMIN — Medication 1: at 08:47

## 2018-05-17 RX ADMIN — Medication 25 MILLIGRAM(S): at 05:44

## 2018-05-17 RX ADMIN — TRAMADOL HYDROCHLORIDE 25 MILLIGRAM(S): 50 TABLET ORAL at 21:33

## 2018-05-17 RX ADMIN — Medication 650 MILLIGRAM(S): at 13:48

## 2018-05-17 RX ADMIN — TRAMADOL HYDROCHLORIDE 25 MILLIGRAM(S): 50 TABLET ORAL at 15:40

## 2018-05-17 NOTE — PROGRESS NOTE ADULT - SUBJECTIVE AND OBJECTIVE BOX
S : 53y year old Female seen at bedside for bilateral digital ulcerations. Patient is NAD and AAO x 3.     Chief Complaint : Patient is a 53y old  Female who presents with a chief complaint of bilateral digital ulcerations     Patient admits to  (-) Fevers, (-) Chills, (-) Nausea, (-) Vomiting, (-) Shortness of Breath      PMH: Diabetic foot ulcer  HTN (hypertension)  Diabetes    PSH:S/P carpal tunnel release  S/P anal fissurectomy  S/P       Allergies:iodinated radiocontrast agents (Hives)      O:   General: Pleasant  female NAD & AOX3.    Integument:  Skin warm, dry and supple bilateral.    Ulceration Left Hallux distal plantar: granular in nature, - purulence - malodor +edema +erythema around hallux  Ulceration Right distal digital 3rd toe: small lesion, fibrotic in nature, negative signs of infection  Ulceration Right distal digital 4th toe: granular in nature, no signs of infection, presence of edema   Vascular: Dorsalis Pedis and Posterior Tibial pulses 2/4.  Capillary re-fill time less then 3 seconds digits 1-5 bilateral.    Neuro: Protective sensation diminished to the level of the digits bilateral.  MSK: Muscle strength 5/5 all major muscle groups bilateral.    Deformity:  A: Bilateral distal digital ulcerations     P:   Chart reviewed and Patient evaluated  Discussed diagnosis and treatment with patient  Patients HbA1c is elevated at 11.9, no surgical intervention at this time for ulcerations  Recommend MRI bilaterally as ESR and CRP are elevated   Wound dressed with DSD  Wound Care Instructions for Left Hallux: 1. Cleanse wound with sterile saline 2. Apply santyl to wound site 3. Apply DSD 4. Apply van   X-rays reviewed  SANDHYA reviewed   Continue IV antibiotics as per ID, patient had recent orthopedic surgery on right shoulder with screw fixation, continue IV antibiotics for further infection   No weight bearing restrictions BL   Discussed with Dr. Suggs

## 2018-05-17 NOTE — PROGRESS NOTE ADULT - SUBJECTIVE AND OBJECTIVE BOX
Patient is seen and examined at the bed side, is afebrile.        REVIEW OF SYSTEMS: All other review systems are negative          ALLERGIES: Iodine/contrast        Vital Signs Last 24 Hrs  T(C): 36.9 (17 May 2018 15:40), Max: 36.9 (17 May 2018 15:40)  T(F): 98.4 (17 May 2018 15:40), Max: 98.4 (17 May 2018 15:40)  HR: 78 (17 May 2018 15:40) (70 - 88)  BP: 138/67 (17 May 2018 15:40) (100/60 - 146/80)  BP(mean): --  RR: 16 (17 May 2018 15:40) (16 - 18)  SpO2: 95% (17 May 2018 15:40) (95% - 98%)        PHYSICAL EXAM:  GENERAL: Not in distress  CVS: s1 and s2 present  RESP: Air entry B/L  GI: abdomen soft and nontender  EXT: Left great toe swollen, erythematous with clean ulcer  CNS: AAOX3          LABS:                        12.9   8.4   )-----------( 241      ( 17 May 2018 08:25 )             40.7                           14.0   10.6  )-----------( 278      ( 16 May 2018 14:11 )             44.6         05-17    142  |  108  |  12  ----------------------------<  149<H>  3.6   |  23  |  0.79    Ca    8.6      17 May 2018 08:25    TPro  7.8  /  Alb  3.4<L>  /  TBili  0.8  /  DBili  x   /  AST  20  /  ALT  25  /  AlkPhos  101  05-16    05-16    139  |  104  |  14  ----------------------------<  167<H>  3.7   |  26  |  0.91    Ca    9.4      16 May 2018 14:11    TPro  7.8  /  Alb  3.4<L>  /  TBili  0.8  /  DBili  x   /  AST  20  /  ALT  25  /  AlkPhos  101  -16    PT/INR - ( 16 May 2018 14:11 )   PT: 10.9 sec;   INR: 1.00 ratio         PTT - ( 16 May 2018 14:11 )  PTT:28.9 sec  Urinalysis Basic - ( 16 May 2018 14:29 )    Color: Yellow / Appearance: Clear / S.025 / pH: x  Gluc: x / Ketone: Negative  / Bili: Negative / Urobili: Negative   Blood: x / Protein: Negative / Nitrite: Negative   Leuk Esterase: Trace / RBC: 0-2 /HPF / WBC 0-2 /HPF   Sq Epi: x / Non Sq Epi: Moderate /HPF / Bacteria: Few /HPF      CAPILLARY BLOOD GLUCOSE      POCT Blood Glucose.: 204 mg/dL (16 May 2018 12:34)  POCT Blood Glucose.: 224 mg/dL (16 May 2018 12:15)        MEDICATIONS  (STANDING):  ampicillin/sulbactam  IVPB 3 Gram(s) IV Intermittent every 6 hours  aspirin enteric coated 81 milliGRAM(s) Oral daily  clindamycin IVPB 900 milliGRAM(s) IV Intermittent every 8 hours  clindamycin IVPB      collagenase Ointment 1 Application(s) Topical once  enoxaparin Injectable 40 milliGRAM(s) SubCutaneous daily  hydrochlorothiazide 12.5 milliGRAM(s) Oral daily  insulin glargine Injectable (LANTUS) 30 Unit(s) SubCutaneous at bedtime  insulin lispro (HumaLOG) corrective regimen sliding scale   SubCutaneous three times a day before meals  insulin lispro Injectable (HumaLOG) 15 Unit(s) SubCutaneous three times a day before meals  lisinopril 10 milliGRAM(s) Oral daily  metoprolol succinate ER 25 milliGRAM(s) Oral daily  pantoprazole    Tablet 40 milliGRAM(s) Oral before breakfast  sertraline 100 milliGRAM(s) Oral daily  simvastatin 10 milliGRAM(s) Oral at bedtime  sodium chloride 0.9%. 1000 milliLiter(s) (100 mL/Hr) IV Continuous <Continuous>    MEDICATIONS  (PRN):  acetaminophen   Tablet. 650 milliGRAM(s) Oral every 6 hours PRN Mild Pain (1 - 3)  traMADol 25 milliGRAM(s) Oral every 6 hours PRN Moderate Pain (4 - 6)        RADIOLOGY & ADDITIONAL TESTS:    18: Xray Foot AP + Lateral + Oblique, Bilat (18 @ 14:42)  No evidence of bony destruction. Patient is seen and examined at the bed side, is afebrile. She is doing better. The blood cultures are negative to date.        REVIEW OF SYSTEMS: All other review systems are negative          ALLERGIES: Iodine/contrast        Vital Signs Last 24 Hrs  T(C): 36.9 (17 May 2018 15:40), Max: 36.9 (17 May 2018 15:40)  T(F): 98.4 (17 May 2018 15:40), Max: 98.4 (17 May 2018 15:40)  HR: 78 (17 May 2018 15:40) (70 - 88)  BP: 138/67 (17 May 2018 15:40) (100/60 - 146/80)  BP(mean): --  RR: 16 (17 May 2018 15:40) (16 - 18)  SpO2: 95% (17 May 2018 15:40) (95% - 98%)          PHYSICAL EXAM:  GENERAL: Not in distress  CVS: s1 and s2 present  RESP: Air entry B/L  GI: abdomen soft and nontender  EXT: Left great toe swollen, erythematous with clean ulcer  CNS: AAOX3          LABS:                        12.9   8.4   )-----------( 241      ( 17 May 2018 08:25 )             40.7                           14.0   10.6  )-----------( 278      ( 16 May 2018 14:11 )             44.6         05-17    142  |  108  |  12  ----------------------------<  149<H>  3.6   |  23  |  0.79    Ca    8.6      17 May 2018 08:25    TPro  7.8  /  Alb  3.4<L>  /  TBili  0.8  /  DBili  x   /  AST  20  /  ALT  25  /  AlkPhos  101  05-16    05-16    139  |  104  |  14  ----------------------------<  167<H>  3.7   |  26  |  0.91    Ca    9.4      16 May 2018 14:11    TPro  7.8  /  Alb  3.4<L>  /  TBili  0.8  /  DBili  x   /  AST  20  /  ALT  25  /  AlkPhos  101  05-16    PT/INR - ( 16 May 2018 14:11 )   PT: 10.9 sec;   INR: 1.00 ratio         PTT - ( 16 May 2018 14:11 )  PTT:28.9 sec  Urinalysis Basic - ( 16 May 2018 14:29 )    Color: Yellow / Appearance: Clear / S.025 / pH: x  Gluc: x / Ketone: Negative  / Bili: Negative / Urobili: Negative   Blood: x / Protein: Negative / Nitrite: Negative   Leuk Esterase: Trace / RBC: 0-2 /HPF / WBC 0-2 /HPF   Sq Epi: x / Non Sq Epi: Moderate /HPF / Bacteria: Few /HPF      CAPILLARY BLOOD GLUCOSE      POCT Blood Glucose.: 204 mg/dL (16 May 2018 12:34)  POCT Blood Glucose.: 224 mg/dL (16 May 2018 12:15)        MEDICATIONS  (STANDING):  ampicillin/sulbactam  IVPB 3 Gram(s) IV Intermittent every 6 hours  aspirin enteric coated 81 milliGRAM(s) Oral daily  clindamycin IVPB 900 milliGRAM(s) IV Intermittent every 8 hours  clindamycin IVPB      collagenase Ointment 1 Application(s) Topical once  enoxaparin Injectable 40 milliGRAM(s) SubCutaneous daily  hydrochlorothiazide 12.5 milliGRAM(s) Oral daily  insulin glargine Injectable (LANTUS) 30 Unit(s) SubCutaneous at bedtime  insulin lispro (HumaLOG) corrective regimen sliding scale   SubCutaneous three times a day before meals  insulin lispro Injectable (HumaLOG) 15 Unit(s) SubCutaneous three times a day before meals  lisinopril 10 milliGRAM(s) Oral daily  metoprolol succinate ER 25 milliGRAM(s) Oral daily  pantoprazole    Tablet 40 milliGRAM(s) Oral before breakfast  sertraline 100 milliGRAM(s) Oral daily  simvastatin 10 milliGRAM(s) Oral at bedtime  sodium chloride 0.9%. 1000 milliLiter(s) (100 mL/Hr) IV Continuous <Continuous>    MEDICATIONS  (PRN):  acetaminophen   Tablet. 650 milliGRAM(s) Oral every 6 hours PRN Mild Pain (1 - 3)  traMADol 25 milliGRAM(s) Oral every 6 hours PRN Moderate Pain (4 - 6)        RADIOLOGY & ADDITIONAL TESTS:    18: Xray Foot AP + Lateral + Oblique, Bilat (18 @ 14:42)  No evidence of bony destruction.            MICROBIOLOGY DATA:      Culture - Blood (18 @ 23:49)    Specimen Source: .Blood Blood    Culture Results:   No growth to date.      Culture - Blood (18 @ 23:45)    Specimen Source: .Blood Blood    Culture Results:   No growth to date.

## 2018-05-17 NOTE — PROGRESS NOTE ADULT - ASSESSMENT
54 y/o F pt with PMHx of DM, Diabetic Foot Ulcer (bilateral; for several years), HTN and PSHx of Anal Fissurectomy, Carpal Tunnel Release, and  presents to ED c/o nonhealing b/l diabetic foot ulcers and hyperglycemia.

## 2018-05-17 NOTE — PROGRESS NOTE ADULT - ASSESSMENT
A 53y year old Female with poorly  controlled Diabetes, right DFU with OM s/p 6 weeks IV ABx in Feb,2018, has been sent in to the ER from PMD's office for evaluation of Left great toe cellulitis and ketones in her urine. She has no fever or chills. The Xray of B/L foot shows no bony destruction. The ID consult requested to assist with further evaluation and antibiotic management.      # Left Great toe DFU with cellulitis  # Poorly controlled DM2    Would recommend:  1. Follow up Blood cultures  2. Continue Unasyn and ADD Clindamycin  3. Management of DFU as per Podiatry  4. Management of Diabetes as per Primary team    d/w Patient, Dr. Christine and Admitting resident    will follow the patient with you A 53y year old Female with poorly  controlled Diabetes, right DFU with OM s/p 6 weeks IV ABx in Feb,2018, has been sent in to the ER from PMD's office for evaluation of Left great toe cellulitis and ketones in her urine. She has no fever or chills. The Xray of B/L foot shows no bony destruction. The ID consult requested to assist with further evaluation and antibiotic management.      # Left Great toe DFU with cellulitis  # Poorly controlled DM2    Would recommend:  1. Continue Unasyn and Clindamycin  2. Management of DFU as per Podiatry  3. Management of Diabetes as per Primary team    d/w Patient    will follow the patient with you

## 2018-05-17 NOTE — PROGRESS NOTE ADULT - SUBJECTIVE AND OBJECTIVE BOX
PGY 1 Note discussed with supervising resident and primary attending    Patient is a 53y old  Female who presents with a chief complaint of foot ulcer and hyperglycemia (16 May 2018 17:41)      INTERVAL HPI/OVERNIGHT EVENTS: offers no new complaints; current symptoms resolving    MEDICATIONS  (STANDING):  ampicillin/sulbactam  IVPB 3 Gram(s) IV Intermittent every 6 hours  aspirin enteric coated 81 milliGRAM(s) Oral daily  clindamycin IVPB 900 milliGRAM(s) IV Intermittent every 8 hours  clindamycin IVPB      collagenase Ointment 1 Application(s) Topical once  dextrose 5%. 1000 milliLiter(s) (50 mL/Hr) IV Continuous <Continuous>  dextrose 50% Injectable 12.5 Gram(s) IV Push once  dextrose 50% Injectable 25 Gram(s) IV Push once  dextrose 50% Injectable 25 Gram(s) IV Push once  enoxaparin Injectable 40 milliGRAM(s) SubCutaneous daily  hydrochlorothiazide 12.5 milliGRAM(s) Oral daily  insulin glargine Injectable (LANTUS) 30 Unit(s) SubCutaneous at bedtime  insulin lispro (HumaLOG) corrective regimen sliding scale   SubCutaneous three times a day before meals  insulin lispro Injectable (HumaLOG) 15 Unit(s) SubCutaneous three times a day before meals  lisinopril 10 milliGRAM(s) Oral daily  metoprolol succinate ER 25 milliGRAM(s) Oral daily  pantoprazole    Tablet 40 milliGRAM(s) Oral before breakfast  sertraline 100 milliGRAM(s) Oral daily  simvastatin 10 milliGRAM(s) Oral at bedtime  sodium chloride 0.9%. 1000 milliLiter(s) (100 mL/Hr) IV Continuous <Continuous>    MEDICATIONS  (PRN):  dextrose 40% Gel 15 Gram(s) Oral once PRN Blood Glucose LESS THAN 70 milliGRAM(s)/deciliter  glucagon  Injectable 1 milliGRAM(s) IntraMuscular once PRN Glucose LESS THAN 70 milligrams/deciliter      __________________________________________________  REVIEW OF SYSTEMS:    CONSTITUTIONAL: No fever,   EYES: no acute visual disturbances  NECK: No pain or stiffness  RESPIRATORY: No cough; No shortness of breath  CARDIOVASCULAR: No chest pain, no palpitations  GASTROINTESTINAL: No pain. No nausea or vomiting; No diarrhea   NEUROLOGICAL: No headache or numbness, no tremors  MUSCULOSKELETAL: No joint pain, no muscle pain  GENITOURINARY: no dysuria, no frequency, no hesitancy  PSYCHIATRY: no depression , no anxiety  ALL OTHER  ROS negative        Vital Signs Last 24 Hrs  T(C): 36.8 (17 May 2018 08:23), Max: 37.1 (16 May 2018 12:35)  T(F): 98.2 (17 May 2018 08:23), Max: 98.8 (16 May 2018 12:35)  HR: 72 (17 May 2018 08:23) (70 - 88)  BP: 135/82 (17 May 2018 08:23) (100/60 - 146/80)  BP(mean): --  RR: 16 (17 May 2018 08:23) (16 - 18)  SpO2: 96% (17 May 2018 08:23) (95% - 98%)    ________________________________________________  PHYSICAL EXAM:  GENERAL: NAD  HEENT: Normocephalic;  conjunctivae and sclerae clear; moist mucous membranes;   NECK : supple  CHEST/LUNG: Clear to auscultation bilaterally with good air entry   HEART: S1 S2  regular; no murmurs, gallops or rubs  ABDOMEN: Soft, Nontender, Nondistended; Bowel sounds present  EXTREMITIES: Ulceration Left Hallux distal plantar: granular in nature, - purulence - malodor +edema +erythema around hallux  Ulceration Right distal digital 3rd toe: small lesion, fibrotic in nature, negative signs of infection  Ulceration Right distal digital 4th toe: granular in nature, no signs of infection, presence of edema   Vascular: Dorsalis Pedis and Posterior Tibial pulses 2/4.  Capillary re-fill time less then 3 seconds digits 1-5 bilateral.   Right arm bone fusion surgery recently , has sling and plaster   SKIN: warm and dry; no rash  NERVOUS SYSTEM:  Awake and alert; Oriented  to place, person and time ; no new deficits    _________________________________________________  LABS:                        12.9   8.4   )-----------( 241      ( 17 May 2018 08:25 )             40.7         142  |  108  |  12  ----------------------------<  149<H>  3.6   |  23  |  x     Ca    8.6      17 May 2018 08:25    TPro  7.8  /  Alb  3.4<L>  /  TBili  0.8  /  DBili  x   /  AST  20  /  ALT  25  /  AlkPhos  101  05-16    PT/INR - ( 16 May 2018 14:11 )   PT: 10.9 sec;   INR: 1.00 ratio         PTT - ( 16 May 2018 14:11 )  PTT:28.9 sec  Urinalysis Basic - ( 16 May 2018 14:29 )    Color: Yellow / Appearance: Clear / S.025 / pH: x  Gluc: x / Ketone: Negative  / Bili: Negative / Urobili: Negative   Blood: x / Protein: Negative / Nitrite: Negative   Leuk Esterase: Trace / RBC: 0-2 /HPF / WBC 0-2 /HPF   Sq Epi: x / Non Sq Epi: Moderate /HPF / Bacteria: Few /HPF      CAPILLARY BLOOD GLUCOSE      POCT Blood Glucose.: 152 mg/dL (17 May 2018 08:43)  POCT Blood Glucose.: 222 mg/dL (16 May 2018 22:05)  POCT Blood Glucose.: 263 mg/dL (16 May 2018 20:26)  POCT Blood Glucose.: 204 mg/dL (16 May 2018 12:34)  POCT Blood Glucose.: 224 mg/dL (16 May 2018 12:15)          Consultant(s) Notes Reviewed:   YES    Care Discussed with Consultants : YES    Plan of care was discussed with patient and /or primary care giver; all questions and concerns were addressed and care was aligned with patient's wishes.

## 2018-05-17 NOTE — PROGRESS NOTE ADULT - PROBLEM SELECTOR PLAN 2
- f/u A1c : 11.2   - c/w Lantus 30U + 15U humalog + sliding scale - f/u A1c : 11.9  - She takes metformin, trulicity, toujeo (36 units) Humalog (15 units) Jardiance  - Continue with Lantus 30 and humalog 15 tid , hss   - Patient follows Dr. Stewart outpatient

## 2018-05-18 ENCOUNTER — TRANSCRIPTION ENCOUNTER (OUTPATIENT)
Age: 53
End: 2018-05-18

## 2018-05-18 LAB
ANION GAP SERPL CALC-SCNC: 5 MMOL/L — SIGNIFICANT CHANGE UP (ref 5–17)
BASOPHILS # BLD AUTO: 0.1 K/UL — SIGNIFICANT CHANGE UP (ref 0–0.2)
BASOPHILS NFR BLD AUTO: 1.1 % — SIGNIFICANT CHANGE UP (ref 0–2)
BUN SERPL-MCNC: 10 MG/DL — SIGNIFICANT CHANGE UP (ref 7–18)
CALCIUM SERPL-MCNC: 8.6 MG/DL — SIGNIFICANT CHANGE UP (ref 8.4–10.5)
CHLORIDE SERPL-SCNC: 105 MMOL/L — SIGNIFICANT CHANGE UP (ref 96–108)
CO2 SERPL-SCNC: 30 MMOL/L — SIGNIFICANT CHANGE UP (ref 22–31)
CREAT SERPL-MCNC: 0.81 MG/DL — SIGNIFICANT CHANGE UP (ref 0.5–1.3)
EOSINOPHIL # BLD AUTO: 0.7 K/UL — HIGH (ref 0–0.5)
EOSINOPHIL NFR BLD AUTO: 8.8 % — HIGH (ref 0–6)
GLUCOSE BLDC GLUCOMTR-MCNC: 116 MG/DL — HIGH (ref 70–99)
GLUCOSE BLDC GLUCOMTR-MCNC: 117 MG/DL — HIGH (ref 70–99)
GLUCOSE BLDC GLUCOMTR-MCNC: 162 MG/DL — HIGH (ref 70–99)
GLUCOSE BLDC GLUCOMTR-MCNC: 167 MG/DL — HIGH (ref 70–99)
GLUCOSE SERPL-MCNC: 152 MG/DL — HIGH (ref 70–99)
HCT VFR BLD CALC: 41.8 % — SIGNIFICANT CHANGE UP (ref 34.5–45)
HGB BLD-MCNC: 13.2 G/DL — SIGNIFICANT CHANGE UP (ref 11.5–15.5)
LYMPHOCYTES # BLD AUTO: 2.5 K/UL — SIGNIFICANT CHANGE UP (ref 1–3.3)
LYMPHOCYTES # BLD AUTO: 29.7 % — SIGNIFICANT CHANGE UP (ref 13–44)
MAGNESIUM SERPL-MCNC: 1.6 MG/DL — SIGNIFICANT CHANGE UP (ref 1.6–2.6)
MCHC RBC-ENTMCNC: 27.6 PG — SIGNIFICANT CHANGE UP (ref 27–34)
MCHC RBC-ENTMCNC: 31.6 GM/DL — LOW (ref 32–36)
MCV RBC AUTO: 87.5 FL — SIGNIFICANT CHANGE UP (ref 80–100)
MONOCYTES # BLD AUTO: 0.8 K/UL — SIGNIFICANT CHANGE UP (ref 0–0.9)
MONOCYTES NFR BLD AUTO: 9.7 % — SIGNIFICANT CHANGE UP (ref 2–14)
NEUTROPHILS # BLD AUTO: 4.3 K/UL — SIGNIFICANT CHANGE UP (ref 1.8–7.4)
NEUTROPHILS NFR BLD AUTO: 50.8 % — SIGNIFICANT CHANGE UP (ref 43–77)
PHOSPHATE SERPL-MCNC: 3.5 MG/DL — SIGNIFICANT CHANGE UP (ref 2.5–4.5)
PLATELET # BLD AUTO: 250 K/UL — SIGNIFICANT CHANGE UP (ref 150–400)
POTASSIUM SERPL-MCNC: 3.5 MMOL/L — SIGNIFICANT CHANGE UP (ref 3.5–5.3)
POTASSIUM SERPL-SCNC: 3.5 MMOL/L — SIGNIFICANT CHANGE UP (ref 3.5–5.3)
RBC # BLD: 4.78 M/UL — SIGNIFICANT CHANGE UP (ref 3.8–5.2)
RBC # FLD: 12.7 % — SIGNIFICANT CHANGE UP (ref 10.3–14.5)
SODIUM SERPL-SCNC: 140 MMOL/L — SIGNIFICANT CHANGE UP (ref 135–145)
WBC # BLD: 8.4 K/UL — SIGNIFICANT CHANGE UP (ref 3.8–10.5)
WBC # FLD AUTO: 8.4 K/UL — SIGNIFICANT CHANGE UP (ref 3.8–10.5)

## 2018-05-18 PROCEDURE — 73718 MRI LOWER EXTREMITY W/O DYE: CPT | Mod: 26,50

## 2018-05-18 RX ADMIN — ENOXAPARIN SODIUM 40 MILLIGRAM(S): 100 INJECTION SUBCUTANEOUS at 12:06

## 2018-05-18 RX ADMIN — INSULIN GLARGINE 30 UNIT(S): 100 INJECTION, SOLUTION SUBCUTANEOUS at 21:48

## 2018-05-18 RX ADMIN — AMPICILLIN SODIUM AND SULBACTAM SODIUM 500 GRAM(S): 250; 125 INJECTION, POWDER, FOR SUSPENSION INTRAMUSCULAR; INTRAVENOUS at 06:07

## 2018-05-18 RX ADMIN — Medication 15 UNIT(S): at 12:06

## 2018-05-18 RX ADMIN — Medication 100 MILLIGRAM(S): at 14:27

## 2018-05-18 RX ADMIN — Medication 1: at 08:44

## 2018-05-18 RX ADMIN — Medication 15 UNIT(S): at 16:54

## 2018-05-18 RX ADMIN — PANTOPRAZOLE SODIUM 40 MILLIGRAM(S): 20 TABLET, DELAYED RELEASE ORAL at 06:07

## 2018-05-18 RX ADMIN — Medication 100 MILLIGRAM(S): at 21:48

## 2018-05-18 RX ADMIN — Medication 12.5 MILLIGRAM(S): at 06:07

## 2018-05-18 RX ADMIN — Medication 81 MILLIGRAM(S): at 12:06

## 2018-05-18 RX ADMIN — Medication 1: at 12:06

## 2018-05-18 RX ADMIN — TRAMADOL HYDROCHLORIDE 25 MILLIGRAM(S): 50 TABLET ORAL at 21:51

## 2018-05-18 RX ADMIN — AMPICILLIN SODIUM AND SULBACTAM SODIUM 500 GRAM(S): 250; 125 INJECTION, POWDER, FOR SUSPENSION INTRAMUSCULAR; INTRAVENOUS at 18:33

## 2018-05-18 RX ADMIN — AMPICILLIN SODIUM AND SULBACTAM SODIUM 500 GRAM(S): 250; 125 INJECTION, POWDER, FOR SUSPENSION INTRAMUSCULAR; INTRAVENOUS at 12:06

## 2018-05-18 RX ADMIN — Medication 15 UNIT(S): at 08:44

## 2018-05-18 RX ADMIN — LISINOPRIL 10 MILLIGRAM(S): 2.5 TABLET ORAL at 06:07

## 2018-05-18 RX ADMIN — SERTRALINE 100 MILLIGRAM(S): 25 TABLET, FILM COATED ORAL at 12:06

## 2018-05-18 RX ADMIN — SIMVASTATIN 10 MILLIGRAM(S): 20 TABLET, FILM COATED ORAL at 21:48

## 2018-05-18 RX ADMIN — TRAMADOL HYDROCHLORIDE 25 MILLIGRAM(S): 50 TABLET ORAL at 22:30

## 2018-05-18 RX ADMIN — Medication 25 MILLIGRAM(S): at 06:07

## 2018-05-18 RX ADMIN — AMPICILLIN SODIUM AND SULBACTAM SODIUM 500 GRAM(S): 250; 125 INJECTION, POWDER, FOR SUSPENSION INTRAMUSCULAR; INTRAVENOUS at 00:11

## 2018-05-18 RX ADMIN — Medication 100 MILLIGRAM(S): at 06:07

## 2018-05-18 NOTE — PROGRESS NOTE ADULT - SUBJECTIVE AND OBJECTIVE BOX
PGY 1 Note discussed with supervising resident and primary attending    Patient is a 53y old  Female who presents with a chief complaint of foot ulcer and hyperglycemia (16 May 2018 17:41)      INTERVAL HPI/OVERNIGHT EVENTS: offers no new complaints; current symptoms resolving    MEDICATIONS  (STANDING):  ampicillin/sulbactam  IVPB 3 Gram(s) IV Intermittent every 6 hours  aspirin enteric coated 81 milliGRAM(s) Oral daily  clindamycin IVPB 900 milliGRAM(s) IV Intermittent every 8 hours  clindamycin IVPB      collagenase Ointment 1 Application(s) Topical once  enoxaparin Injectable 40 milliGRAM(s) SubCutaneous daily  hydrochlorothiazide 12.5 milliGRAM(s) Oral daily  insulin glargine Injectable (LANTUS) 30 Unit(s) SubCutaneous at bedtime  insulin lispro (HumaLOG) corrective regimen sliding scale   SubCutaneous three times a day before meals  insulin lispro Injectable (HumaLOG) 15 Unit(s) SubCutaneous three times a day before meals  lisinopril 10 milliGRAM(s) Oral daily  metoprolol succinate ER 25 milliGRAM(s) Oral daily  pantoprazole    Tablet 40 milliGRAM(s) Oral before breakfast  sertraline 100 milliGRAM(s) Oral daily  simvastatin 10 milliGRAM(s) Oral at bedtime  sodium chloride 0.9%. 1000 milliLiter(s) (100 mL/Hr) IV Continuous <Continuous>    MEDICATIONS  (PRN):  acetaminophen   Tablet. 650 milliGRAM(s) Oral every 6 hours PRN Mild Pain (1 - 3)  traMADol 25 milliGRAM(s) Oral every 6 hours PRN Moderate Pain (4 - 6)      __________________________________________________  REVIEW OF SYSTEMS:    CONSTITUTIONAL: No fever,   EYES: no acute visual disturbances  NECK: No pain or stiffness  RESPIRATORY: No cough; No shortness of breath  CARDIOVASCULAR: No chest pain, no palpitations  GASTROINTESTINAL: No pain. No nausea or vomiting; No diarrhea   NEUROLOGICAL: No headache or numbness, no tremors  MUSCULOSKELETAL: No joint pain, no muscle pain  GENITOURINARY: no dysuria, no frequency, no hesitancy  PSYCHIATRY: no depression , no anxiety  ALL OTHER  ROS negative        Vital Signs Last 24 Hrs  T(C): 36.7 (17 May 2018 23:40), Max: 36.9 (17 May 2018 15:40)  T(F): 98 (17 May 2018 23:40), Max: 98.4 (17 May 2018 15:40)  HR: 63 (18 May 2018 05:44) (62 - 78)  BP: 136/73 (18 May 2018 05:44) (122/74 - 138/67)  BP(mean): --  RR: 16 (17 May 2018 23:40) (16 - 16)  SpO2: 95% (17 May 2018 23:40) (95% - 96%)    ________________________________________________  PHYSICAL EXAM:  GENERAL: NAD  HEENT: Normocephalic;  conjunctivae and sclerae clear; moist mucous membranes;   NECK : supple  CHEST/LUNG: Clear to auscultation bilaterally with good air entry   HEART: S1 S2  regular; no murmurs, gallops or rubs  ABDOMEN: Soft, Nontender, Nondistended; Bowel sounds present  EXTREMITIES: Ulceration Left Hallux distal plantar: granular in nature, - purulence - malodor +edema +erythema around hallux  Ulceration Right distal digital 3rd toe: small lesion, fibrotic in nature, negative signs of infection  Ulceration Right distal digital 4th toe: granular in nature, no signs of infection, presence of edema   Vascular: Dorsalis Pedis and Posterior Tibial pulses 2/4.  Capillary re-fill time less then 3 seconds digits 1-5 bilateral.   Right arm bone fusion surgery recently , has sling and plaster   SKIN: warm and dry; no rash  NERVOUS SYSTEM:  Awake and alert; Oriented  to place, person and time ; no new deficits    _________________________________________________  LABS:                        13.2   8.4   )-----------( 250      ( 18 May 2018 06:30 )             41.8     05-18    140  |  105  |  10  ----------------------------<  152<H>  3.5   |  30  |  0.81    Ca    8.6      18 May 2018 06:30  Phos  3.5     05-18  Mg     1.6     05-18    TPro  7.8  /  Alb  3.4<L>  /  TBili  0.8  /  DBili  x   /  AST  20  /  ALT  25  /  AlkPhos  101  05-16    PT/INR - ( 16 May 2018 14:11 )   PT: 10.9 sec;   INR: 1.00 ratio         PTT - ( 16 May 2018 14:11 )  PTT:28.9 sec  Urinalysis Basic - ( 16 May 2018 14:29 )    Color: Yellow / Appearance: Clear / S.025 / pH: x  Gluc: x / Ketone: Negative  / Bili: Negative / Urobili: Negative   Blood: x / Protein: Negative / Nitrite: Negative   Leuk Esterase: Trace / RBC: 0-2 /HPF / WBC 0-2 /HPF   Sq Epi: x / Non Sq Epi: Moderate /HPF / Bacteria: Few /HPF      CAPILLARY BLOOD GLUCOSE      POCT Blood Glucose.: 127 mg/dL (17 May 2018 21:16)  POCT Blood Glucose.: 108 mg/dL (17 May 2018 16:59)  POCT Blood Glucose.: 123 mg/dL (17 May 2018 12:00)  POCT Blood Glucose.: 152 mg/dL (17 May 2018 08:43)        Consultant(s) Notes Reviewed:   YES    Care Discussed with Consultants : YES    Plan of care was discussed with patient and /or primary care giver; all questions and concerns were addressed and care was aligned with patient's wishes.

## 2018-05-18 NOTE — DISCHARGE NOTE ADULT - PLAN OF CARE
Prevent recurrence You came in with a diabetic foot ulcer on Left foot. You were started on Unasyn and Clindamycin. You were monitored for white count , fever which were all within normal limits. MRI shows :    Please continue with prescribed antibiotics and follow up with PMD in 1 week. Your Hemoglobin A1c was found to be 11.9 during your admission. This means that [you are diabetic] [your diabetes is not well controlled . Continue with your current medications as described in your discharge note and try to adhere to a diet without excessive intake of carbohydrates and sugar and perform exercise appropriate to your physical status. Follow up with your primary care provider within 1 month of discharge for further recommendation and monitoring. Consider repeating your Hemoglobin A1c within 3 months after discharge to monitor your average blood glucose control. Your blood pressure was well-controlled during your admission. Continue with your current regimen of anti-hypertensive medications as mentioned in your discharge summary and ensure that you follow up with your primary care provider within 1 month of discharge for further recommendations and monitoring. Continue with current medications. You came in with a diabetic foot ulcer on Left foot. You were started on Unasyn and Clindamycin. You were monitored for white count , fever which were all within normal limits. 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  Please continue with prescribed antibiotics till 5/30  and follow up with PMD in 1 week.  Follow up with Podiatry outpatient.  Wound Care Instructions for Left Hallux and right 4th digit: 1. Cleanse wound with sterile saline 2. Apply santyl to wound site 3. Apply DSD 4. Apply van a1c < 7 Your Hemoglobin A1c was found to be 11.9 during your admission. This means that [you are diabetic] [your diabetes is not well controlled . Continue with your current medications as described in your discharge note and try to adhere to a diet without excessive intake of carbohydrates and sugar and perform exercise appropriate to your physical status. Follow up with Dr Galloway within 2 days of discharge for further recommendation and monitoring. Consider repeating your Hemoglobin A1c within 3 months after discharge to monitor your average blood glucose control.

## 2018-05-18 NOTE — DISCHARGE NOTE ADULT - HOSPITAL COURSE
HPI and Ed course :  52 y/o F pt with PMHx of DM, Diabetic Foot Ulcer (bilateral; for several years), HTN and PSHx of Anal Fissurectomy, Carpal Tunnel Release, and  presents to ED c/o b/l diabetic foot ulcers . Patient states having chronic b/l foot ulcer, on L big toe and R fourth toe. She was admitted here in March and was treated for osteomyelitis. She was discharged with PICC line to continue 6 weeks of abx and had outpatient followup with her podiatrist. She currently denies fever, pain or worsening discharge. She did notice increase swelling and erythema around the wound. Her podiatry started her on oral augmentin 1 week ago and added cipro yesterday for suspicion of infection. Patient states that she went to see her PCP for vitamin B12 shot when he did UA and saw ketones in urine along with blood glucose of 337 so advised her to come to the ED. Patient denies polyuria, and polydipsia. She said she recently had surgery on her right hand for displaced bones? that were fixed with pins. Due to the surgery on her dominant hand she is having trouble injecting insulin.     Patient was admitted to medicine for Diabetic Foot ulcer.  Patient had a Left hallux toe ulcer mainly with discharge , with non healing ulcers on right toe.  Started on Unasyn and Clindamycin. No fever , no white count.   MRI of foot was done to rule out Osteomyelitis.  Blood cultures were negative.    Patient has uncontrolled diabetes sec to non compliance.  a1c : 11.9   Was continued on Lantus 30 and Humalog 15 units tid while in hospital.  Patient follows Dr. Stewart outpatient.     Patient was continued on home medications for htn , depression.    After stabilization , decision was made to discharge the patient. HPI and Ed course :  54 y/o F pt with PMHx of DM, Diabetic Foot Ulcer (bilateral; for several years), HTN and PSHx of Anal Fissurectomy, Carpal Tunnel Release, and  presents to ED c/o b/l diabetic foot ulcers . Patient states having chronic b/l foot ulcer, on L big toe and R fourth toe. She was admitted here in March and was treated for osteomyelitis. She was discharged with PICC line to continue 6 weeks of abx and had outpatient followup with her podiatrist. She currently denies fever, pain or worsening discharge. She did notice increase swelling and erythema around the wound. Her podiatry started her on oral augmentin 1 week ago and added cipro yesterday for suspicion of infection. Patient states that she went to see her PCP for vitamin B12 shot when he did UA and saw ketones in urine along with blood glucose of 337 so advised her to come to the ED. Patient denies polyuria, and polydipsia. She said she recently had surgery on her right hand for displaced bones? that were fixed with pins. Due to the surgery on her dominant hand she is having trouble injecting insulin.     Patient was admitted to medicine for Diabetic Foot ulcer.  Patient had a Left hallux toe ulcer mainly with discharge , with non healing ulcers on right toe.  Started on Unasyn and Clindamycin. No fever , no white count.   MRI of foot was done to rule out Osteomyelitis , which was negative , except patient had a transverse distal phalanx fracture.   Patient to follow up outpatient with Podiatry.   Blood cultures were negative.    Patient has uncontrolled diabetes sec to non compliance.  a1c : 11.9   Was continued on Lantus 30 and Humalog 12 units tid while in hospital. Patient to be discharged on same regimen and to follow up with Dr. Stewart in 1-2 days for V-go.       Patient was continued on home medications for htn , depression.    After stabilization , decision was made to discharge the patient.

## 2018-05-18 NOTE — DISCHARGE NOTE ADULT - CARE PLAN
Principal Discharge DX:	Diabetic foot ulcer  Goal:	Prevent recurrence  Assessment and plan of treatment:	You came in with a diabetic foot ulcer on Left foot. You were started on Unasyn and Clindamycin. You were monitored for white count , fever which were all within normal limits. MRI shows :    Please continue with prescribed antibiotics and follow up with PMD in 1 week.  Secondary Diagnosis:	Diabetes  Assessment and plan of treatment:	Your Hemoglobin A1c was found to be 11.9 during your admission. This means that [you are diabetic] [your diabetes is not well controlled . Continue with your current medications as described in your discharge note and try to adhere to a diet without excessive intake of carbohydrates and sugar and perform exercise appropriate to your physical status. Follow up with your primary care provider within 1 month of discharge for further recommendation and monitoring. Consider repeating your Hemoglobin A1c within 3 months after discharge to monitor your average blood glucose control.  Secondary Diagnosis:	HTN (hypertension)  Assessment and plan of treatment:	Your blood pressure was well-controlled during your admission. Continue with your current regimen of anti-hypertensive medications as mentioned in your discharge summary and ensure that you follow up with your primary care provider within 1 month of discharge for further recommendations and monitoring.  Secondary Diagnosis:	Depression  Assessment and plan of treatment:	Continue with current medications. Principal Discharge DX:	Diabetic foot ulcer  Goal:	Prevent recurrence  Assessment and plan of treatment:	You came in with a diabetic foot ulcer on Left foot. You were started on Unasyn and Clindamycin. You were monitored for white count , fever which were all within normal limits. 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  Please continue with prescribed antibiotics till 5/30  and follow up with PMD in 1 week.  Follow up with Podiatry outpatient.  Wound Care Instructions for Left Hallux and right 4th digit: 1. Cleanse wound with sterile saline 2. Apply santyl to wound site 3. Apply DSD 4. Apply van  Secondary Diagnosis:	Diabetes  Goal:	a1c < 7  Assessment and plan of treatment:	Your Hemoglobin A1c was found to be 11.9 during your admission. This means that [you are diabetic] [your diabetes is not well controlled . Continue with your current medications as described in your discharge note and try to adhere to a diet without excessive intake of carbohydrates and sugar and perform exercise appropriate to your physical status. Follow up with Dr Galloway within 2 days of discharge for further recommendation and monitoring. Consider repeating your Hemoglobin A1c within 3 months after discharge to monitor your average blood glucose control.  Secondary Diagnosis:	HTN (hypertension)  Assessment and plan of treatment:	Your blood pressure was well-controlled during your admission. Continue with your current regimen of anti-hypertensive medications as mentioned in your discharge summary and ensure that you follow up with your primary care provider within 1 month of discharge for further recommendations and monitoring.  Secondary Diagnosis:	Depression  Assessment and plan of treatment:	Continue with current medications.

## 2018-05-18 NOTE — DISCHARGE NOTE ADULT - MEDICATION SUMMARY - MEDICATIONS TO TAKE
I will START or STAY ON the medications listed below when I get home from the hospital:    acetaminophen 325 mg oral tablet  -- 2 tab(s) by mouth every 6 hours, As needed, For Temp greater than 38 C (100.4 F)  -- Indication: For pain     aspirin 81 mg oral delayed release tablet  -- 1 tab(s) by mouth once a day  -- Indication: For Cardioprotective     sertraline 100 mg oral tablet  -- 1 tab(s) by mouth once a day  -- Indication: For Depression    metFORMIN 1000 mg oral tablet  -- 1 tab(s) by mouth 2 times a day (with meals)  -- Indication: For DM    insulin glargine 100 units/mL subcutaneous solution  -- 30 unit(s) subcutaneous once a day (at bedtime)  -- Indication: For DM    insulin lispro 100 units/mL subcutaneous solution  -- 12 unit(s) subcutaneous 3 times a day (before meals)  -- Indication: For DM    simvastatin 10 mg oral tablet  -- 1 tab(s) by mouth once a day (at bedtime)  -- Indication: For HLD    gemfibrozil 600 mg oral tablet  -- 1 tab(s) by mouth 2 times a day (before meals)  -- Indication: For HLD    benazepril-hydrochlorothiazide  -- 12.5/25  -- Indication: For HTN (hypertension)    doxycycline monohydrate 100 mg oral capsule  -- 1 cap(s) by mouth every 12 hours  -- Indication: For Cellulitis of toe    metoprolol succinate 25 mg oral tablet, extended release  -- 1 tab(s) by mouth once a day  -- Indication: For HTN (hypertension)    collagenase 250 units/g topical ointment  -- 1 application on skin once  -- Indication: For Cellulitis of toe    famotidine 20 mg oral tablet  -- 1 tab(s) by mouth once a day  -- Indication: For GERD    Augmentin 875 mg-125 mg oral tablet  -- 1 milligram(s) by mouth 2 times a day   -- Finish all this medication unless otherwise directed by prescriber.  Take with food or milk.    -- Indication: For Cellulitis of toe    Dexilant 60 mg oral delayed release capsule  -- 1 cap(s) by mouth once a day  -- Indication: For GERD

## 2018-05-18 NOTE — PROGRESS NOTE ADULT - ASSESSMENT
A 53y year old Female with poorly  controlled Diabetes, right DFU with OM s/p 6 weeks IV ABx in Feb,2018, has been sent in to the ER from PMD's office for evaluation of Left great toe cellulitis and ketones in her urine. She has no fever or chills. The Xray of B/L foot shows no bony destruction. The ID consult requested to assist with further evaluation and antibiotic management.      # Left Great toe DFU with cellulitis  # Poorly controlled DM2    Would recommend:  1. Continue Unasyn and Clindamycin  2. Management of DFU as per Podiatry  3. Management of Diabetes as per Primary team    d/w Patient    will follow the patient with you A 53y year old Female with poorly  controlled Diabetes, right DFU with OM s/p 6 weeks IV ABx in Feb,2018, has been sent in to the ER from PMD's office for evaluation of Left great toe cellulitis and ketones in her urine. She has no fever or chills. The Xray of B/L foot shows no bony destruction. The ID consult requested to assist with further evaluation and antibiotic management.      # Left Great toe DFU with cellulitis  # Poorly controlled DM2    Would recommend:  1. Continue Unasyn and discontinue Clindamycin  2. Management of DFU as per Podiatry  3. May change to oral Augmentin 875 mg q 12hours on discharge to continue until 5/30/18    d/w Patient    will follow the patient with you

## 2018-05-18 NOTE — DISCHARGE NOTE ADULT - CARE PROVIDER_API CALL
Papito Suggs (DPLUI), Foot Surgery; Recon RearfootAnkle Surgery  [address]  [place] [address]  Phone: [phone]  Fax: [phone]

## 2018-05-18 NOTE — PROGRESS NOTE ADULT - PROBLEM SELECTOR PLAN 2
- f/u A1c : 11.9  - She takes metformin, trulicity, toujeo (36 units) Humalog (15 units) Jardiance  - Continue with Lantus 30 and humalog 15 tid , hss   - Patient follows Dr. Stewart outpatient

## 2018-05-18 NOTE — DISCHARGE NOTE ADULT - PATIENT PORTAL LINK FT
You can access the [facility] Patient Portal, offered by [facility], by registering with the following website: [email or web address]

## 2018-05-18 NOTE — DISCHARGE NOTE ADULT - MEDICATION SUMMARY - MEDICATIONS TO CHANGE
I will SWITCH the dose or number of times a day I take the medications listed below when I get home from the hospital:    insulin glargine 100 units/mL subcutaneous solution  -- 46 unit(s) subcutaneous once a day (at bedtime)

## 2018-05-18 NOTE — DISCHARGE NOTE ADULT - MEDICATION SUMMARY - MEDICATIONS TO STOP TAKING
I will STOP taking the medications listed below when I get home from the hospital:    dc left picc line at the end of therapy on 3/28/2018  -- 1 application intravenous once a day    cbc, cmp, esr, crp and Vancomycin trough once a week starting 2/21/2018  -- 1 application intradermal once a week    Heparin Lock Flush 10 units/mL intravenous solution  -- 10 unit(s) intravenously every 24 hours , 5ml post infusion    ZyrTEC 10 mg oral tablet  -- 1 tab(s) by mouth once a day    vancomycin 1 g/200 mL-NaCl 0.9% intravenous solution  -- 1.25 gram(s) intravenously every 12 hours until 3/28/2018    piperacillin-tazobactam 2 g-0.25 g intravenous injection  -- 3.375 gram(s) intravenous every 8 hours  until 3/28/2018    normal saline  -- 5 milliliter(s) intravenous 2 times a day , pre and post infusion    fluconazole 200 mg oral tablet  -- 1 tab(s) by mouth once a day    lisinopril 5 mg oral tablet  -- 0.5 tab(s) by mouth once a day

## 2018-05-18 NOTE — PROGRESS NOTE ADULT - SUBJECTIVE AND OBJECTIVE BOX
Patient is seen and examined at the bed side, is afebrile. She is doing better. The blood cultures are negative to date.        REVIEW OF SYSTEMS: All other review systems are negative          ALLERGIES: Iodine/contrast          Vital Signs Last 24 Hrs  T(C): 36.3 (18 May 2018 16:13), Max: 36.8 (18 May 2018 07:46)  T(F): 97.3 (18 May 2018 16:13), Max: 98.2 (18 May 2018 07:46)  HR: 59 (18 May 2018 16:13) (59 - 67)  BP: 105/66 (18 May 2018 16:13) (105/66 - 136/73)  BP(mean): --  RR: 17 (18 May 2018 16:13) (16 - 17)  SpO2: 95% (18 May 2018 16:13) (95% - 98%)          PHYSICAL EXAM:  GENERAL: Not in distress  CVS: s1 and s2 present  RESP: Air entry B/L  GI: abdomen soft and nontender  EXT: Left great toe swollen, erythematous with clean ulcer  CNS: AAOX3          LABS:                        13.2   8.4   )-----------( 250      ( 18 May 2018 06:30 )             41.8                           12.9   8.4   )-----------( 241      ( 17 May 2018 08:25 )             40.7                           14.0   10.6  )-----------( 278      ( 16 May 2018 14:11 )             44.6       05    140  |  105  |  10  ----------------------------<  152<H>  3.5   |  30  |  0.81    Ca    8.6      18 May 2018 06:30  Phos  3.5     05-18  Mg     1.6     18      05-17    142  |  108  |  12  ----------------------------<  149<H>  3.6   |  23  |  0.79    Ca    8.6      17 May 2018 08:25    TPro  7.8  /  Alb  3.4<L>  /  TBili  0.8  /  DBili  x   /  AST  20  /  ALT  25  /  AlkPhos  101  05-16    TPro  7.8  /  Alb  3.4<L>  /  TBili  0.8  /  DBili  x   /  AST  20  /  ALT  25  /  AlkPhos  101  05-16    PT/INR - ( 16 May 2018 14:11 )   PT: 10.9 sec;   INR: 1.00 ratio         PTT - ( 16 May 2018 14:11 )  PTT:28.9 sec  Urinalysis Basic - ( 16 May 2018 14:29 )    Color: Yellow / Appearance: Clear / S.025 / pH: x  Gluc: x / Ketone: Negative  / Bili: Negative / Urobili: Negative   Blood: x / Protein: Negative / Nitrite: Negative   Leuk Esterase: Trace / RBC: 0-2 /HPF / WBC 0-2 /HPF   Sq Epi: x / Non Sq Epi: Moderate /HPF / Bacteria: Few /HPF      CAPILLARY BLOOD GLUCOSE      POCT Blood Glucose.: 204 mg/dL (16 May 2018 12:34)  POCT Blood Glucose.: 224 mg/dL (16 May 2018 12:15)        MEDICATIONS  (STANDING):  ampicillin/sulbactam  IVPB 3 Gram(s) IV Intermittent every 6 hours  aspirin enteric coated 81 milliGRAM(s) Oral daily  clindamycin IVPB 900 milliGRAM(s) IV Intermittent every 8 hours  clindamycin IVPB      collagenase Ointment 1 Application(s) Topical once  enoxaparin Injectable 40 milliGRAM(s) SubCutaneous daily  hydrochlorothiazide 12.5 milliGRAM(s) Oral daily  insulin glargine Injectable (LANTUS) 30 Unit(s) SubCutaneous at bedtime  insulin lispro (HumaLOG) corrective regimen sliding scale   SubCutaneous three times a day before meals  insulin lispro Injectable (HumaLOG) 15 Unit(s) SubCutaneous three times a day before meals  lisinopril 10 milliGRAM(s) Oral daily  metoprolol succinate ER 25 milliGRAM(s) Oral daily  pantoprazole    Tablet 40 milliGRAM(s) Oral before breakfast  sertraline 100 milliGRAM(s) Oral daily  simvastatin 10 milliGRAM(s) Oral at bedtime  sodium chloride 0.9%. 1000 milliLiter(s) (100 mL/Hr) IV Continuous <Continuous>    MEDICATIONS  (PRN):  acetaminophen   Tablet. 650 milliGRAM(s) Oral every 6 hours PRN Mild Pain (1 - 3)  traMADol 25 milliGRAM(s) Oral every 6 hours PRN Moderate Pain (4 - 6)        RADIOLOGY & ADDITIONAL TESTS:    18: Xray Foot AP + Lateral + Oblique, Bilat (18 @ 14:42)  No evidence of bony destruction.            MICROBIOLOGY DATA:      Culture - Blood (18 @ 23:49)    Specimen Source: .Blood Blood    Culture Results:   No growth to date.      Culture - Blood (18 @ 23:45)    Specimen Source: .Blood Blood    Culture Results:   No growth to date. Patient is seen and examined at the bed side, is afebrile. She is doing better. The Left great toe redness almost resolved and clean base ulcer.        REVIEW OF SYSTEMS: All other review systems are negative          ALLERGIES: Iodine/contrast          Vital Signs Last 24 Hrs  T(C): 36.3 (18 May 2018 16:13), Max: 36.8 (18 May 2018 07:46)  T(F): 97.3 (18 May 2018 16:13), Max: 98.2 (18 May 2018 07:46)  HR: 59 (18 May 2018 16:13) (59 - 67)  BP: 105/66 (18 May 2018 16:13) (105/66 - 136/73)  BP(mean): --  RR: 17 (18 May 2018 16:13) (16 - 17)  SpO2: 95% (18 May 2018 16:13) (95% - 98%)          PHYSICAL EXAM:  GENERAL: Not in distress  CVS: s1 and s2 present  RESP: Air entry B/L  GI: abdomen soft and nontender  EXT: Left great toe swelling and redness resolving, with clean ulcer  CNS: AAOX3          LABS:                        13.2   8.4   )-----------( 250      ( 18 May 2018 06:30 )             41.8                                     14.0   10.6  )-----------( 278      ( 16 May 2018 14:11 )             44.6           05    140  |  105  |  10  ----------------------------<  152<H>  3.5   |  30  |  0.81    Ca    8.6      18 May 2018 06:30  Phos  3.5     0518  Mg     1.6         142  |  108  |  12  ----------------------------<  149<H>  3.6   |  23  |  0.79    Ca    8.6      17 May 2018 08:25    TPro  7.8  /  Alb  3.4<L>  /  TBili  0.8  /  DBili  x   /  AST  20  /  ALT  25  /  AlkPhos  101  05-16    TPro  7.8  /  Alb  3.4<L>  /  TBili  0.8  /  DBili  x   /  AST  20  /  ALT  25  /  AlkPhos  101  05-16    PT/INR - ( 16 May 2018 14:11 )   PT: 10.9 sec;   INR: 1.00 ratio         PTT - ( 16 May 2018 14:11 )  PTT:28.9 sec  Urinalysis Basic - ( 16 May 2018 14:29 )    Color: Yellow / Appearance: Clear / S.025 / pH: x  Gluc: x / Ketone: Negative  / Bili: Negative / Urobili: Negative   Blood: x / Protein: Negative / Nitrite: Negative   Leuk Esterase: Trace / RBC: 0-2 /HPF / WBC 0-2 /HPF   Sq Epi: x / Non Sq Epi: Moderate /HPF / Bacteria: Few /HPF      CAPILLARY BLOOD GLUCOSE      POCT Blood Glucose.: 204 mg/dL (16 May 2018 12:34)  POCT Blood Glucose.: 224 mg/dL (16 May 2018 12:15)        MEDICATIONS  (STANDING):  ampicillin/sulbactam  IVPB 3 Gram(s) IV Intermittent every 6 hours  aspirin enteric coated 81 milliGRAM(s) Oral daily  clindamycin IVPB 900 milliGRAM(s) IV Intermittent every 8 hours  clindamycin IVPB      collagenase Ointment 1 Application(s) Topical once  enoxaparin Injectable 40 milliGRAM(s) SubCutaneous daily  hydrochlorothiazide 12.5 milliGRAM(s) Oral daily  insulin glargine Injectable (LANTUS) 30 Unit(s) SubCutaneous at bedtime  insulin lispro (HumaLOG) corrective regimen sliding scale   SubCutaneous three times a day before meals  insulin lispro Injectable (HumaLOG) 15 Unit(s) SubCutaneous three times a day before meals  lisinopril 10 milliGRAM(s) Oral daily  metoprolol succinate ER 25 milliGRAM(s) Oral daily  pantoprazole    Tablet 40 milliGRAM(s) Oral before breakfast  sertraline 100 milliGRAM(s) Oral daily  simvastatin 10 milliGRAM(s) Oral at bedtime  sodium chloride 0.9%. 1000 milliLiter(s) (100 mL/Hr) IV Continuous <Continuous>    MEDICATIONS  (PRN):  acetaminophen   Tablet. 650 milliGRAM(s) Oral every 6 hours PRN Mild Pain (1 - 3)  traMADol 25 milliGRAM(s) Oral every 6 hours PRN Moderate Pain (4 - 6)        RADIOLOGY & ADDITIONAL TESTS:        18: Xray Foot AP + Lateral + Oblique, Bilat (05.16.18 @ 14:42)  No evidence of bony destruction.            MICROBIOLOGY DATA:      Culture - Blood (18 @ 23:49)    Specimen Source: .Blood Blood    Culture Results:   No growth to date.      Culture - Blood (18 @ 23:45)    Specimen Source: .Blood Blood    Culture Results:   No growth to date.

## 2018-05-19 LAB
ANION GAP SERPL CALC-SCNC: 5 MMOL/L — SIGNIFICANT CHANGE UP (ref 5–17)
BASOPHILS # BLD AUTO: 0.1 K/UL — SIGNIFICANT CHANGE UP (ref 0–0.2)
BASOPHILS NFR BLD AUTO: 1.2 % — SIGNIFICANT CHANGE UP (ref 0–2)
BUN SERPL-MCNC: 10 MG/DL — SIGNIFICANT CHANGE UP (ref 7–18)
CALCIUM SERPL-MCNC: 9.1 MG/DL — SIGNIFICANT CHANGE UP (ref 8.4–10.5)
CHLORIDE SERPL-SCNC: 104 MMOL/L — SIGNIFICANT CHANGE UP (ref 96–108)
CO2 SERPL-SCNC: 32 MMOL/L — HIGH (ref 22–31)
CREAT SERPL-MCNC: 0.82 MG/DL — SIGNIFICANT CHANGE UP (ref 0.5–1.3)
EOSINOPHIL # BLD AUTO: 0.7 K/UL — HIGH (ref 0–0.5)
EOSINOPHIL NFR BLD AUTO: 9.2 % — HIGH (ref 0–6)
GLUCOSE BLDC GLUCOMTR-MCNC: 138 MG/DL — HIGH (ref 70–99)
GLUCOSE BLDC GLUCOMTR-MCNC: 148 MG/DL — HIGH (ref 70–99)
GLUCOSE BLDC GLUCOMTR-MCNC: 216 MG/DL — HIGH (ref 70–99)
GLUCOSE BLDC GLUCOMTR-MCNC: 86 MG/DL — SIGNIFICANT CHANGE UP (ref 70–99)
GLUCOSE SERPL-MCNC: 155 MG/DL — HIGH (ref 70–99)
HCT VFR BLD CALC: 41.4 % — SIGNIFICANT CHANGE UP (ref 34.5–45)
HGB BLD-MCNC: 13.3 G/DL — SIGNIFICANT CHANGE UP (ref 11.5–15.5)
LYMPHOCYTES # BLD AUTO: 2.2 K/UL — SIGNIFICANT CHANGE UP (ref 1–3.3)
LYMPHOCYTES # BLD AUTO: 30.4 % — SIGNIFICANT CHANGE UP (ref 13–44)
MAGNESIUM SERPL-MCNC: 1.8 MG/DL — SIGNIFICANT CHANGE UP (ref 1.6–2.6)
MCHC RBC-ENTMCNC: 28.2 PG — SIGNIFICANT CHANGE UP (ref 27–34)
MCHC RBC-ENTMCNC: 32.2 GM/DL — SIGNIFICANT CHANGE UP (ref 32–36)
MCV RBC AUTO: 87.7 FL — SIGNIFICANT CHANGE UP (ref 80–100)
MONOCYTES # BLD AUTO: 0.7 K/UL — SIGNIFICANT CHANGE UP (ref 0–0.9)
MONOCYTES NFR BLD AUTO: 9.7 % — SIGNIFICANT CHANGE UP (ref 2–14)
NEUTROPHILS # BLD AUTO: 3.7 K/UL — SIGNIFICANT CHANGE UP (ref 1.8–7.4)
NEUTROPHILS NFR BLD AUTO: 49.5 % — SIGNIFICANT CHANGE UP (ref 43–77)
PHOSPHATE SERPL-MCNC: 3.9 MG/DL — SIGNIFICANT CHANGE UP (ref 2.5–4.5)
PLATELET # BLD AUTO: 241 K/UL — SIGNIFICANT CHANGE UP (ref 150–400)
POTASSIUM SERPL-MCNC: 3.6 MMOL/L — SIGNIFICANT CHANGE UP (ref 3.5–5.3)
POTASSIUM SERPL-SCNC: 3.6 MMOL/L — SIGNIFICANT CHANGE UP (ref 3.5–5.3)
RBC # BLD: 4.72 M/UL — SIGNIFICANT CHANGE UP (ref 3.8–5.2)
RBC # FLD: 12.9 % — SIGNIFICANT CHANGE UP (ref 10.3–14.5)
SODIUM SERPL-SCNC: 141 MMOL/L — SIGNIFICANT CHANGE UP (ref 135–145)
WBC # BLD: 7.4 K/UL — SIGNIFICANT CHANGE UP (ref 3.8–10.5)
WBC # FLD AUTO: 7.4 K/UL — SIGNIFICANT CHANGE UP (ref 3.8–10.5)

## 2018-05-19 RX ORDER — INSULIN LISPRO 100/ML
13 VIAL (ML) SUBCUTANEOUS
Qty: 0 | Refills: 0 | Status: DISCONTINUED | OUTPATIENT
Start: 2018-05-19 | End: 2018-05-21

## 2018-05-19 RX ADMIN — TRAMADOL HYDROCHLORIDE 25 MILLIGRAM(S): 50 TABLET ORAL at 13:23

## 2018-05-19 RX ADMIN — TRAMADOL HYDROCHLORIDE 25 MILLIGRAM(S): 50 TABLET ORAL at 21:15

## 2018-05-19 RX ADMIN — ENOXAPARIN SODIUM 40 MILLIGRAM(S): 100 INJECTION SUBCUTANEOUS at 12:01

## 2018-05-19 RX ADMIN — AMPICILLIN SODIUM AND SULBACTAM SODIUM 500 GRAM(S): 250; 125 INJECTION, POWDER, FOR SUSPENSION INTRAMUSCULAR; INTRAVENOUS at 00:17

## 2018-05-19 RX ADMIN — Medication 15 UNIT(S): at 17:28

## 2018-05-19 RX ADMIN — Medication 15 UNIT(S): at 12:00

## 2018-05-19 RX ADMIN — Medication 100 MILLIGRAM(S): at 05:37

## 2018-05-19 RX ADMIN — Medication 81 MILLIGRAM(S): at 13:25

## 2018-05-19 RX ADMIN — INSULIN GLARGINE 30 UNIT(S): 100 INJECTION, SOLUTION SUBCUTANEOUS at 21:44

## 2018-05-19 RX ADMIN — SERTRALINE 100 MILLIGRAM(S): 25 TABLET, FILM COATED ORAL at 12:00

## 2018-05-19 RX ADMIN — Medication 25 MILLIGRAM(S): at 05:38

## 2018-05-19 RX ADMIN — AMPICILLIN SODIUM AND SULBACTAM SODIUM 500 GRAM(S): 250; 125 INJECTION, POWDER, FOR SUSPENSION INTRAMUSCULAR; INTRAVENOUS at 12:05

## 2018-05-19 RX ADMIN — TRAMADOL HYDROCHLORIDE 25 MILLIGRAM(S): 50 TABLET ORAL at 20:24

## 2018-05-19 RX ADMIN — LISINOPRIL 10 MILLIGRAM(S): 2.5 TABLET ORAL at 05:38

## 2018-05-19 RX ADMIN — Medication 15 UNIT(S): at 08:49

## 2018-05-19 RX ADMIN — AMPICILLIN SODIUM AND SULBACTAM SODIUM 500 GRAM(S): 250; 125 INJECTION, POWDER, FOR SUSPENSION INTRAMUSCULAR; INTRAVENOUS at 05:38

## 2018-05-19 RX ADMIN — TRAMADOL HYDROCHLORIDE 25 MILLIGRAM(S): 50 TABLET ORAL at 13:53

## 2018-05-19 RX ADMIN — PANTOPRAZOLE SODIUM 40 MILLIGRAM(S): 20 TABLET, DELAYED RELEASE ORAL at 05:38

## 2018-05-19 RX ADMIN — Medication 2: at 11:59

## 2018-05-19 RX ADMIN — Medication 100 MILLIGRAM(S): at 13:25

## 2018-05-19 RX ADMIN — AMPICILLIN SODIUM AND SULBACTAM SODIUM 500 GRAM(S): 250; 125 INJECTION, POWDER, FOR SUSPENSION INTRAMUSCULAR; INTRAVENOUS at 17:28

## 2018-05-19 RX ADMIN — Medication 12.5 MILLIGRAM(S): at 05:38

## 2018-05-19 RX ADMIN — SIMVASTATIN 10 MILLIGRAM(S): 20 TABLET, FILM COATED ORAL at 20:25

## 2018-05-19 NOTE — CONSULT NOTE ADULT - PROBLEM SELECTOR RECOMMENDATION 9
un cont due to inability to inject insulin  cont lantus 30   decrease humalog to 13 units  fsg ac and hs  consider V- go vs pump tx as out pt

## 2018-05-19 NOTE — CONSULT NOTE ADULT - SUBJECTIVE AND OBJECTIVE BOX
Patient is a 53y old  Female who presents with a chief complaint of foot ulcer and hyperglycemia (18 May 2018 11:18)      HPI:  54 y/o F pt with PMHx of DM, Diabetic Foot Ulcer (bilateral; for several years), HTN and PSHx of Anal Fissurectomy, Carpal Tunnel Release, and  presents to ED c/o b/l diabetic foot ulcers . Patient states having chronic b/l foot ulcer, on L big toe and R fourth toe. She was admitted here in March and was treated for osteomyelitis. She was discharged with PICC line to continue 6 weeks of abx and had outpatient followup with her podiatrist. She currently denies fever, pain or worsening discharge. She did notice increase swelling and erythema around the wound. Her podiatry started her on oral augmentin 1 week ago and added cipro yesterday for suspicion of infection. Patient states that she went to see her PCP for vitamin B12 shot when he did UA and saw ketones in urine along with blood glucose of 337 so advised her to come to the ED. Patient denies polyuria, and polydipsia. She said she recently had surgery on her right hand for displaced bones? that were fixed with pins. Due to the surgery on her dominant hand she is having trouble injecting insulin.     SH: Denies smoking, alcohol or illicit drug use. Ambulates independently.   Allergies: Iodine- hives (16 May 2018 17:41)      PAST MEDICAL & SURGICAL HISTORY:  Diabetic foot ulcer  HTN (hypertension)  Diabetes  S/P carpal tunnel release  S/P anal fissurectomy  S/P          MEDICATIONS  (STANDING):  ampicillin/sulbactam  IVPB 3 Gram(s) IV Intermittent every 6 hours  aspirin enteric coated 81 milliGRAM(s) Oral daily  collagenase Ointment 1 Application(s) Topical once  enoxaparin Injectable 40 milliGRAM(s) SubCutaneous daily  hydrochlorothiazide 12.5 milliGRAM(s) Oral daily  insulin glargine Injectable (LANTUS) 30 Unit(s) SubCutaneous at bedtime  insulin lispro (HumaLOG) corrective regimen sliding scale   SubCutaneous three times a day before meals  insulin lispro Injectable (HumaLOG) 15 Unit(s) SubCutaneous three times a day before meals  lisinopril 10 milliGRAM(s) Oral daily  metoprolol succinate ER 25 milliGRAM(s) Oral daily  pantoprazole    Tablet 40 milliGRAM(s) Oral before breakfast  sertraline 100 milliGRAM(s) Oral daily  simvastatin 10 milliGRAM(s) Oral at bedtime  sodium chloride 0.9%. 1000 milliLiter(s) (100 mL/Hr) IV Continuous <Continuous>    MEDICATIONS  (PRN):  acetaminophen   Tablet. 650 milliGRAM(s) Oral every 6 hours PRN Mild Pain (1 - 3)  traMADol 25 milliGRAM(s) Oral every 6 hours PRN Moderate Pain (4 - 6)      FAMILY HISTORY:  Family history of lung cancer  Family history of diabetes mellitus      SOCIAL HISTORY:      REVIEW OF SYSTEMS:  CONSTITUTIONAL: No fever, weight loss, or fatigue  EYES: No eye pain, visual disturbances, or discharge  ENT:  No difficulty hearing, tinnitus, vertigo; No sinus or throat pain  NECK: No pain or stiffness  RESPIRATORY: No cough, wheezing, chills or hemoptysis; No Shortness of Breath  CARDIOVASCULAR: No chest pain, palpitations, passing out, dizziness, or leg swelling  GASTROINTESTINAL: No abdominal or epigastric pain. No nausea, vomiting, or hematemesis; No diarrhea or constipation. No melena or hematochezia.  GENITOURINARY: No dysuria, frequency, hematuria, or incontinence  NEUROLOGICAL: No headaches, memory loss, loss of strength, numbness, or tremors  SKIN: No itching, burning, rashes, or lesions   LYMPH Nodes: No enlarged glands  ENDOCRINE: No heat or cold intolerance; No hair loss  MUSCULOSKELETAL: No joint pain or swelling; No muscle, back, or extremity pain  PSYCHIATRIC: No depression, anxiety, mood swings, or difficulty sleeping  HEME/LYMPH: No easy bruising, or bleeding gums  ALLERGY AND IMMUNOLOGIC: No hives or eczema	        Vital Signs Last 24 Hrs  T(C): 36.3 (19 May 2018 15:50), Max: 36.9 (18 May 2018 23:49)  T(F): 97.4 (19 May 2018 15:50), Max: 98.4 (18 May 2018 23:49)  HR: 64 (19 May 2018 15:50) (62 - 75)  BP: 123/68 (19 May 2018 15:50) (114/56 - 123/68)  BP(mean): --  RR: 17 (19 May 2018 15:50) (16 - 18)  SpO2: 97% (19 May 2018 15:50) (96% - 98%)      Constitutional:      HEENT: nad    Neck:  No JVD, bruits or thyromegaly    Respiratory:  Clear without rales or rhonchi    Cardiovascular:  RR without murmur, rub or gallop.    Gastrointestinal: Soft without hepatosplenomegaly.    Extremities: without cyanosis, clubbing or edema. + dressing of right arm and feet    Neurological:  Oriented   x 3     . No gross sensory or motor defects.        LABS:                        13.3   7.4   )-----------( 241      ( 19 May 2018 07:50 )             41.4         141  |  104  |  10  ----------------------------<  155<H>  3.6   |  32<H>  |  0.82    Ca    9.1      19 May 2018 07:50  Phos  3.9       Mg     1.8                   CAPILLARY BLOOD GLUCOSE      POCT Blood Glucose.: 86 mg/dL (19 May 2018 16:28)  POCT Blood Glucose.: 216 mg/dL (19 May 2018 11:32)  POCT Blood Glucose.: 148 mg/dL (19 May 2018 08:38)  POCT Blood Glucose.: 116 mg/dL (18 May 2018 21:31)      RADIOLOGY & ADDITIONAL STUDIES:

## 2018-05-19 NOTE — PROGRESS NOTE ADULT - ASSESSMENT
A 53y year old Female with poorly  controlled Diabetes, right DFU with OM s/p 6 weeks IV ABx in Feb,2018, has been sent in to the ER from PMD's office for evaluation of Left great toe cellulitis and ketones in her urine. She has no fever or chills. The Xray of B/L foot shows no bony destruction. The ID consult requested to assist with further evaluation and antibiotic management.      # Left Great toe DFU with cellulitis  # Poorly controlled DM2    Would recommend:  1. Discontinue Clindamycin  2. Continue Unasyn   3. May change to oral Augmentin 875 mg q 12hours on discharge to continue until 5/30/18    d/w Patient    will follow the patient with you

## 2018-05-19 NOTE — PROGRESS NOTE ADULT - SUBJECTIVE AND OBJECTIVE BOX
Patient is seen and examined at the bed side, is afebrile. She has no new complaints.  The Left great toe redness almost resolved and clean base ulcer.        REVIEW OF SYSTEMS: All other review systems are negative          ALLERGIES: Iodine/contrast        Vital Signs Last 24 Hrs  T(C): 36.3 (19 May 2018 15:50), Max: 36.9 (18 May 2018 23:49)  T(F): 97.4 (19 May 2018 15:50), Max: 98.4 (18 May 2018 23:49)  HR: 64 (19 May 2018 15:50) (62 - 75)  BP: 123/68 (19 May 2018 15:50) (114/56 - 123/68)  BP(mean): --  RR: 17 (19 May 2018 15:50) (16 - 18)  SpO2: 97% (19 May 2018 15:50) (96% - 98%)          PHYSICAL EXAM:  GENERAL: Not in distress  CVS: s1 and s2 present  RESP: Air entry B/L  GI: abdomen soft and nontender  EXT: Left great toe swelling and redness resolving, with clean ulcer  CNS: AAOX3          LABS:                        13.3   7.4   )-----------( 241      ( 19 May 2018 07:50 )             41.4                           13.2   8.4   )-----------( 250      ( 18 May 2018 06:30 )             41.8           05-19    141  |  104  |  10  ----------------------------<  155<H>  3.6   |  32<H>  |  0.82    Ca    9.1      19 May 2018 07:50  Phos  3.9     05-19  Mg     1.8     18    140  |  105  |  10  ----------------------------<  152<H>  3.5   |  30  |  0.81    Ca    8.6      18 May 2018 06:30  Phos  3.5     05-18  Mg     1.6           05-17    142  |  108  |  12  ----------------------------<  149<H>  3.6   |  23  |  0.79    Ca    8.6      17 May 2018 08:25    TPro  7.8  /  Alb  3.4<L>  /  TBili  0.8  /  DBili  x   /  AST  20  /  ALT  25  /  AlkPhos  101  05-16    TPro  7.8  /  Alb  3.4<L>  /  TBili  0.8  /  DBili  x   /  AST  20  /  ALT  25  /  AlkPhos  101  05-16    PT/INR - ( 16 May 2018 14:11 )   PT: 10.9 sec;   INR: 1.00 ratio         PTT - ( 16 May 2018 14:11 )  PTT:28.9 sec  Urinalysis Basic - ( 16 May 2018 14:29 )    Color: Yellow / Appearance: Clear / S.025 / pH: x  Gluc: x / Ketone: Negative  / Bili: Negative / Urobili: Negative   Blood: x / Protein: Negative / Nitrite: Negative   Leuk Esterase: Trace / RBC: 0-2 /HPF / WBC 0-2 /HPF   Sq Epi: x / Non Sq Epi: Moderate /HPF / Bacteria: Few /HPF      CAPILLARY BLOOD GLUCOSE      POCT Blood Glucose.: 204 mg/dL (16 May 2018 12:34)  POCT Blood Glucose.: 224 mg/dL (16 May 2018 12:15)        MEDICATIONS  (STANDING):  ampicillin/sulbactam  IVPB 3 Gram(s) IV Intermittent every 6 hours  aspirin enteric coated 81 milliGRAM(s) Oral daily  clindamycin IVPB 900 milliGRAM(s) IV Intermittent every 8 hours  clindamycin IVPB      collagenase Ointment 1 Application(s) Topical once  enoxaparin Injectable 40 milliGRAM(s) SubCutaneous daily  hydrochlorothiazide 12.5 milliGRAM(s) Oral daily  insulin glargine Injectable (LANTUS) 30 Unit(s) SubCutaneous at bedtime  insulin lispro (HumaLOG) corrective regimen sliding scale   SubCutaneous three times a day before meals  insulin lispro Injectable (HumaLOG) 15 Unit(s) SubCutaneous three times a day before meals  lisinopril 10 milliGRAM(s) Oral daily  metoprolol succinate ER 25 milliGRAM(s) Oral daily  pantoprazole    Tablet 40 milliGRAM(s) Oral before breakfast  sertraline 100 milliGRAM(s) Oral daily  simvastatin 10 milliGRAM(s) Oral at bedtime  sodium chloride 0.9%. 1000 milliLiter(s) (100 mL/Hr) IV Continuous <Continuous>    MEDICATIONS  (PRN):  acetaminophen   Tablet. 650 milliGRAM(s) Oral every 6 hours PRN Mild Pain (1 - 3)  traMADol 25 milliGRAM(s) Oral every 6 hours PRN Moderate Pain (4 - 6)        RADIOLOGY & ADDITIONAL TESTS:        18: Xray Foot AP + Lateral + Oblique, Bilat (18 @ 14:42)  No evidence of bony destruction.            MICROBIOLOGY DATA:      Culture - Blood (18 @ 23:49)    Specimen Source: .Blood Blood    Culture Results:   No growth to date.      Culture - Blood (18 @ 23:45)    Specimen Source: .Blood Blood    Culture Results:   No growth to date.

## 2018-05-19 NOTE — PROGRESS NOTE ADULT - SUBJECTIVE AND OBJECTIVE BOX
Patient was seen and examined  Patient is a 53y old  Female who presents with a chief complaint of foot ulcer and hyperglycemia (18 May 2018 11:18)      INTERVAL HPI/OVERNIGHT EVENTS:  T(C): 36.4 (05-19-18 @ 08:42), Max: 36.9 (05-18-18 @ 23:49)  HR: 67 (05-19-18 @ 08:42) (59 - 75)  BP: 114/56 (05-19-18 @ 08:42) (105/66 - 122/64)  RR: 16 (05-19-18 @ 08:42) (16 - 18)  SpO2: 98% (05-19-18 @ 08:42) (95% - 98%)  Wt(kg): --  I&O's Summary      LABS:                        13.3   7.4   )-----------( 241      ( 19 May 2018 07:50 )             41.4     05-19    141  |  104  |  10  ----------------------------<  155<H>  3.6   |  32<H>  |  0.82    Ca    9.1      19 May 2018 07:50  Phos  3.9     05-19  Mg     1.8     05-19          CAPILLARY BLOOD GLUCOSE      POCT Blood Glucose.: 216 mg/dL (19 May 2018 11:32)  POCT Blood Glucose.: 148 mg/dL (19 May 2018 08:38)  POCT Blood Glucose.: 116 mg/dL (18 May 2018 21:31)  POCT Blood Glucose.: 117 mg/dL (18 May 2018 16:37)  POCT Blood Glucose.: 162 mg/dL (18 May 2018 12:00)              MEDICATIONS  (STANDING):  ampicillin/sulbactam  IVPB 3 Gram(s) IV Intermittent every 6 hours  aspirin enteric coated 81 milliGRAM(s) Oral daily  clindamycin IVPB 900 milliGRAM(s) IV Intermittent every 8 hours  clindamycin IVPB      collagenase Ointment 1 Application(s) Topical once  enoxaparin Injectable 40 milliGRAM(s) SubCutaneous daily  hydrochlorothiazide 12.5 milliGRAM(s) Oral daily  insulin glargine Injectable (LANTUS) 30 Unit(s) SubCutaneous at bedtime  insulin lispro (HumaLOG) corrective regimen sliding scale   SubCutaneous three times a day before meals  insulin lispro Injectable (HumaLOG) 15 Unit(s) SubCutaneous three times a day before meals  lisinopril 10 milliGRAM(s) Oral daily  metoprolol succinate ER 25 milliGRAM(s) Oral daily  pantoprazole    Tablet 40 milliGRAM(s) Oral before breakfast  sertraline 100 milliGRAM(s) Oral daily  simvastatin 10 milliGRAM(s) Oral at bedtime  sodium chloride 0.9%. 1000 milliLiter(s) (100 mL/Hr) IV Continuous <Continuous>    MEDICATIONS  (PRN):  acetaminophen   Tablet. 650 milliGRAM(s) Oral every 6 hours PRN Mild Pain (1 - 3)  traMADol 25 milliGRAM(s) Oral every 6 hours PRN Moderate Pain (4 - 6)      RADIOLOGY & ADDITIONAL TESTS:    Imaging Personally Reviewed:  [ ] YES  [ ] NO    REVIEW OF SYSTEMS:  CONSTITUTIONAL: No fever, weight loss, or fatigue  EYES: No eye pain, visual disturbances, or discharge  ENMT:  No difficulty hearing, tinnitus, vertigo; No sinus or throat pain  NECK: No pain or stiffness  BREASTS: No pain, masses, or nipple discharge  RESPIRATORY: No cough, wheezing, chills or hemoptysis; No shortness of breath  CARDIOVASCULAR: No chest pain, palpitations, dizziness, or leg swelling  GASTROINTESTINAL: No abdominal or epigastric pain. No nausea, vomiting, or hematemesis; No diarrhea or constipation. No melena or hematochezia.  GENITOURINARY: No dysuria, frequency, hematuria, or incontinence  NEUROLOGICAL: No headaches, memory loss, loss of strength, numbness, or tremors  SKIN: No itching, burning, rashes, or lesions   LYMPH NODES: No enlarged glands  ENDOCRINE: No heat or cold intolerance; No hair loss  MUSCULOSKELETAL: No joint pain or swelling; No muscle, back, or extremity pain  PSYCHIATRIC: No depression, anxiety, mood swings, or difficulty sleeping  HEME/LYMPH: No easy bruising, or bleeding gums  ALLERY AND IMMUNOLOGIC: No hives or eczema      Consultant(s) Notes Reviewed:  [ ] YES  [ ] NO    PHYSICAL EXAM:  GENERAL: NAD, well-groomed, well-developed  HEAD:  Atraumatic, Normocephalic  EYES: EOMI, PERRLA, conjunctiva and sclera clear  ENMT: No tonsillar erythema, exudates, or enlargement; Moist mucous membranes, Good dentition, No lesions  NECK: Supple, No JVD, Normal thyroid  NERVOUS SYSTEM:  Alert & Oriented X3, Good concentration; Motor Strength 5/5 B/L upper and lower extremities; DTRs 2+ intact and symmetric  CHEST/LUNG: Clear to percussion bilaterally; No rales, rhonchi, wheezing, or rubs  HEART: Regular rate and rhythm; No murmurs, rubs, or gallops  ABDOMEN: Soft, Nontender, Nondistended; Bowel sounds present  EXTREMITIES:  2+ Peripheral Pulses, No clubbing, cyanosis, or edema  LYMPH: No lymphadenopathy noted  SKIN: No rashes or lesions    Care Discussed with Consultants/Other Providers [ x] YES  [ ] NO

## 2018-05-19 NOTE — CONSULT NOTE ADULT - ASSESSMENT
52 y/o F pt with PMHx of DM, Diabetic Foot Ulcer (bilateral; for several years), HTN and PSHx of Anal Fissurectomy, Carpal Tunnel Release, and  presents to ED c/o b/l diabetic foot ulcers . Found to have un cont DM. She said she recently had surgery on her right hand for displaced bones? that were fixed with pins. Due to the surgery on her dominant hand she is having trouble injecting insulin.

## 2018-05-20 LAB
ANION GAP SERPL CALC-SCNC: 4 MMOL/L — LOW (ref 5–17)
BASOPHILS # BLD AUTO: 0.1 K/UL — SIGNIFICANT CHANGE UP (ref 0–0.2)
BASOPHILS NFR BLD AUTO: 1.4 % — SIGNIFICANT CHANGE UP (ref 0–2)
BUN SERPL-MCNC: 10 MG/DL — SIGNIFICANT CHANGE UP (ref 7–18)
CALCIUM SERPL-MCNC: 9 MG/DL — SIGNIFICANT CHANGE UP (ref 8.4–10.5)
CHLORIDE SERPL-SCNC: 106 MMOL/L — SIGNIFICANT CHANGE UP (ref 96–108)
CO2 SERPL-SCNC: 32 MMOL/L — HIGH (ref 22–31)
CREAT SERPL-MCNC: 0.76 MG/DL — SIGNIFICANT CHANGE UP (ref 0.5–1.3)
EOSINOPHIL # BLD AUTO: 0.9 K/UL — HIGH (ref 0–0.5)
EOSINOPHIL NFR BLD AUTO: 10 % — HIGH (ref 0–6)
GLUCOSE BLDC GLUCOMTR-MCNC: 109 MG/DL — HIGH (ref 70–99)
GLUCOSE BLDC GLUCOMTR-MCNC: 127 MG/DL — HIGH (ref 70–99)
GLUCOSE BLDC GLUCOMTR-MCNC: 135 MG/DL — HIGH (ref 70–99)
GLUCOSE BLDC GLUCOMTR-MCNC: 160 MG/DL — HIGH (ref 70–99)
GLUCOSE SERPL-MCNC: 135 MG/DL — HIGH (ref 70–99)
HCT VFR BLD CALC: 42.9 % — SIGNIFICANT CHANGE UP (ref 34.5–45)
HGB BLD-MCNC: 13.6 G/DL — SIGNIFICANT CHANGE UP (ref 11.5–15.5)
LYMPHOCYTES # BLD AUTO: 2.7 K/UL — SIGNIFICANT CHANGE UP (ref 1–3.3)
LYMPHOCYTES # BLD AUTO: 31.5 % — SIGNIFICANT CHANGE UP (ref 13–44)
MAGNESIUM SERPL-MCNC: 1.7 MG/DL — SIGNIFICANT CHANGE UP (ref 1.6–2.6)
MCHC RBC-ENTMCNC: 28 PG — SIGNIFICANT CHANGE UP (ref 27–34)
MCHC RBC-ENTMCNC: 31.8 GM/DL — LOW (ref 32–36)
MCV RBC AUTO: 88.1 FL — SIGNIFICANT CHANGE UP (ref 80–100)
MONOCYTES # BLD AUTO: 0.9 K/UL — SIGNIFICANT CHANGE UP (ref 0–0.9)
MONOCYTES NFR BLD AUTO: 9.9 % — SIGNIFICANT CHANGE UP (ref 2–14)
NEUTROPHILS # BLD AUTO: 4.1 K/UL — SIGNIFICANT CHANGE UP (ref 1.8–7.4)
NEUTROPHILS NFR BLD AUTO: 47.1 % — SIGNIFICANT CHANGE UP (ref 43–77)
PHOSPHATE SERPL-MCNC: 3.9 MG/DL — SIGNIFICANT CHANGE UP (ref 2.5–4.5)
PLATELET # BLD AUTO: 252 K/UL — SIGNIFICANT CHANGE UP (ref 150–400)
POTASSIUM SERPL-MCNC: 4 MMOL/L — SIGNIFICANT CHANGE UP (ref 3.5–5.3)
POTASSIUM SERPL-SCNC: 4 MMOL/L — SIGNIFICANT CHANGE UP (ref 3.5–5.3)
RBC # BLD: 4.87 M/UL — SIGNIFICANT CHANGE UP (ref 3.8–5.2)
RBC # FLD: 13.1 % — SIGNIFICANT CHANGE UP (ref 10.3–14.5)
SODIUM SERPL-SCNC: 142 MMOL/L — SIGNIFICANT CHANGE UP (ref 135–145)
WBC # BLD: 8.7 K/UL — SIGNIFICANT CHANGE UP (ref 3.8–10.5)
WBC # FLD AUTO: 8.7 K/UL — SIGNIFICANT CHANGE UP (ref 3.8–10.5)

## 2018-05-20 RX ADMIN — Medication 81 MILLIGRAM(S): at 12:21

## 2018-05-20 RX ADMIN — INSULIN GLARGINE 30 UNIT(S): 100 INJECTION, SOLUTION SUBCUTANEOUS at 21:20

## 2018-05-20 RX ADMIN — Medication 13 UNIT(S): at 12:20

## 2018-05-20 RX ADMIN — Medication 1: at 12:20

## 2018-05-20 RX ADMIN — SIMVASTATIN 10 MILLIGRAM(S): 20 TABLET, FILM COATED ORAL at 21:21

## 2018-05-20 RX ADMIN — AMPICILLIN SODIUM AND SULBACTAM SODIUM 500 GRAM(S): 250; 125 INJECTION, POWDER, FOR SUSPENSION INTRAMUSCULAR; INTRAVENOUS at 23:28

## 2018-05-20 RX ADMIN — TRAMADOL HYDROCHLORIDE 25 MILLIGRAM(S): 50 TABLET ORAL at 05:40

## 2018-05-20 RX ADMIN — AMPICILLIN SODIUM AND SULBACTAM SODIUM 500 GRAM(S): 250; 125 INJECTION, POWDER, FOR SUSPENSION INTRAMUSCULAR; INTRAVENOUS at 18:07

## 2018-05-20 RX ADMIN — AMPICILLIN SODIUM AND SULBACTAM SODIUM 500 GRAM(S): 250; 125 INJECTION, POWDER, FOR SUSPENSION INTRAMUSCULAR; INTRAVENOUS at 00:25

## 2018-05-20 RX ADMIN — PANTOPRAZOLE SODIUM 40 MILLIGRAM(S): 20 TABLET, DELAYED RELEASE ORAL at 05:43

## 2018-05-20 RX ADMIN — Medication 100 MILLIGRAM(S): at 21:21

## 2018-05-20 RX ADMIN — TRAMADOL HYDROCHLORIDE 25 MILLIGRAM(S): 50 TABLET ORAL at 07:15

## 2018-05-20 RX ADMIN — AMPICILLIN SODIUM AND SULBACTAM SODIUM 500 GRAM(S): 250; 125 INJECTION, POWDER, FOR SUSPENSION INTRAMUSCULAR; INTRAVENOUS at 12:21

## 2018-05-20 RX ADMIN — Medication 25 MILLIGRAM(S): at 05:43

## 2018-05-20 RX ADMIN — TRAMADOL HYDROCHLORIDE 25 MILLIGRAM(S): 50 TABLET ORAL at 20:45

## 2018-05-20 RX ADMIN — ENOXAPARIN SODIUM 40 MILLIGRAM(S): 100 INJECTION SUBCUTANEOUS at 12:21

## 2018-05-20 RX ADMIN — Medication 13 UNIT(S): at 08:29

## 2018-05-20 RX ADMIN — TRAMADOL HYDROCHLORIDE 25 MILLIGRAM(S): 50 TABLET ORAL at 20:13

## 2018-05-20 RX ADMIN — LISINOPRIL 10 MILLIGRAM(S): 2.5 TABLET ORAL at 05:43

## 2018-05-20 RX ADMIN — AMPICILLIN SODIUM AND SULBACTAM SODIUM 500 GRAM(S): 250; 125 INJECTION, POWDER, FOR SUSPENSION INTRAMUSCULAR; INTRAVENOUS at 05:41

## 2018-05-20 RX ADMIN — Medication 650 MILLIGRAM(S): at 23:31

## 2018-05-20 RX ADMIN — SERTRALINE 100 MILLIGRAM(S): 25 TABLET, FILM COATED ORAL at 12:21

## 2018-05-20 RX ADMIN — Medication 13 UNIT(S): at 16:57

## 2018-05-20 RX ADMIN — Medication 12.5 MILLIGRAM(S): at 05:43

## 2018-05-20 NOTE — DIETITIAN INITIAL EVALUATION ADULT. - PROBLEM SELECTOR PLAN 1
Chronic foot ulcer  s/p 6 weeks IV abx in March   She was on augmentin since 1 week and cipro since yesterday prior to coming in  XR- negative  - Podiatry consulted  - f/u ESR, CRP  - Tight blood sugar control  - Will start Unasyn  - f/u lactate  - ID- Dr Boo per MD

## 2018-05-20 NOTE — DIETITIAN INITIAL EVALUATION ADULT. - SOURCE
patient/other (specify)/chart review chart review. Pt well-communicated, but not willing to provide more detailed nutrition history information./patient/other (specify)

## 2018-05-20 NOTE — DIETITIAN INITIAL EVALUATION ADULT. - NS AS NUTRI INTERV MEALS SNACK
Composition of meals/snacks/change diet to Consistent CHO DASH diet/Carbohydrate - modified diet/Diets modified for specific foods and ingredients

## 2018-05-20 NOTE — DIETITIAN INITIAL EVALUATION ADULT. - ADHERENCE
fair limited data at pt not willing to discuss, possible poor as [id number], but trouble with Insulin injection due to dominant hand surgery noted, which may also contribute to uncontrolled DM; will follow up with Endocrinologist per pt/n/a

## 2018-05-20 NOTE — PROGRESS NOTE ADULT - PROBLEM SELECTOR PLAN 2
- f/u A1c : 11.9  - She takes metformin, trulicity, toujeo (36 units) Humalog (15 units) Jardiance  - Continue with Lantus 30 and humalog 13 tid , hss   - Patient follows Dr. Stewart outpatient

## 2018-05-20 NOTE — PROGRESS NOTE ADULT - SUBJECTIVE AND OBJECTIVE BOX
PGY 1 Note discussed with supervising resident and primary attending    Patient is a 53y old  Female who presents with a chief complaint of foot ulcer and hyperglycemia (18 May 2018 11:18)      INTERVAL HPI/OVERNIGHT EVENTS: offers no new complaints; current symptoms resolving    MEDICATIONS  (STANDING):  ampicillin/sulbactam  IVPB 3 Gram(s) IV Intermittent every 6 hours  aspirin enteric coated 81 milliGRAM(s) Oral daily  collagenase Ointment 1 Application(s) Topical once  enoxaparin Injectable 40 milliGRAM(s) SubCutaneous daily  hydrochlorothiazide 12.5 milliGRAM(s) Oral daily  insulin glargine Injectable (LANTUS) 30 Unit(s) SubCutaneous at bedtime  insulin lispro (HumaLOG) corrective regimen sliding scale   SubCutaneous three times a day before meals  insulin lispro Injectable (HumaLOG) 13 Unit(s) SubCutaneous three times a day before meals  lisinopril 10 milliGRAM(s) Oral daily  metoprolol succinate ER 25 milliGRAM(s) Oral daily  pantoprazole    Tablet 40 milliGRAM(s) Oral before breakfast  sertraline 100 milliGRAM(s) Oral daily  simvastatin 10 milliGRAM(s) Oral at bedtime  sodium chloride 0.9%. 1000 milliLiter(s) (100 mL/Hr) IV Continuous <Continuous>    MEDICATIONS  (PRN):  acetaminophen   Tablet. 650 milliGRAM(s) Oral every 6 hours PRN Mild Pain (1 - 3)  traMADol 25 milliGRAM(s) Oral every 6 hours PRN Moderate Pain (4 - 6)      __________________________________________________  REVIEW OF SYSTEMS:    CONSTITUTIONAL: No fever,   EYES: no acute visual disturbances  NECK: No pain or stiffness  RESPIRATORY: No cough; No shortness of breath  CARDIOVASCULAR: No chest pain, no palpitations  GASTROINTESTINAL: No pain. No nausea or vomiting; No diarrhea   NEUROLOGICAL: No headache or numbness, no tremors  MUSCULOSKELETAL: No joint pain, no muscle pain  GENITOURINARY: no dysuria, no frequency, no hesitancy  PSYCHIATRY: no depression , no anxiety  ALL OTHER  ROS negative        Vital Signs Last 24 Hrs  T(C): 36.3 (20 May 2018 08:33), Max: 36.9 (20 May 2018 00:24)  T(F): 97.4 (20 May 2018 08:33), Max: 98.5 (20 May 2018 00:24)  HR: 56 (20 May 2018 08:33) (56 - 64)  BP: 126/84 (20 May 2018 08:33) (103/56 - 126/84)  BP(mean): --  RR: 18 (20 May 2018 08:33) (17 - 18)  SpO2: 97% (20 May 2018 08:33) (95% - 97%)    ________________________________________________  PHYSICAL EXAM:  GENERAL: NAD  HEENT: Normocephalic;  conjunctivae and sclerae clear; moist mucous membranes;   NECK : supple  CHEST/LUNG: Clear to auscultation bilaterally with good air entry   HEART: S1 S2  regular; no murmurs, gallops or rubs  ABDOMEN: Soft, Nontender, Nondistended; Bowel sounds present  EXTREMITIES: Ulceration Left Hallux distal plantar: granular in nature, - purulence - malodor +edema +erythema around hallux  Ulceration Right distal digital 3rd toe: small lesion, fibrotic in nature, negative signs of infection  Ulceration Right distal digital 4th toe: granular in nature, no signs of infection, presence of edema   Vascular: Dorsalis Pedis and Posterior Tibial pulses 2/4.  Capillary re-fill time less then 3 seconds digits 1-5 bilateral.   Right arm bone fusion surgery recently , has sling and plaster   SKIN: warm and dry; no rash  NERVOUS SYSTEM:  Awake and alert; Oriented  to place, person and time ; no new deficits  _________________________________________________  LABS:                        13.6   8.7   )-----------( 252      ( 20 May 2018 06:46 )             42.9     05-20    142  |  106  |  10  ----------------------------<  135<H>  4.0   |  32<H>  |  0.76    Ca    9.0      20 May 2018 06:46  Phos  3.9     05-20  Mg     1.7     05-20          CAPILLARY BLOOD GLUCOSE      POCT Blood Glucose.: 127 mg/dL (20 May 2018 08:22)  POCT Blood Glucose.: 138 mg/dL (19 May 2018 21:26)  POCT Blood Glucose.: 86 mg/dL (19 May 2018 16:28)  POCT Blood Glucose.: 216 mg/dL (19 May 2018 11:32)      < from: MR Foot No Cont, Bilateral (05.18.18 @ 19:27) >    RIGHT FOOT: Findings of severe midfoot arthrosis, likely reflecting   Charcot arthropathy. No definite evidence of osteomyelitis, however there   is skin ulceration about the midfoot or forefoot and osteomyelitis would   be difficult to exclude about the tarsometatarsal joints. Clinical   correlation is requested in this regard.    LEFT FOOT: Transverse fracture through the base of the first distal   phalanx with abnormal bone marrow edema pattern in both sides of the   fracture site and in the distal first proximal phalanx as well as   surrounding soft tissue swelling and fluid signal within the fracture   site. Findings all likely reflect posttraumatic changes, however there is   skin ulceration clinically apparent in this region and osteomyelitis   would not be. Clinical correlation is requested in this regard.      < end of copied text >      Consultant(s) Notes Reviewed:   YES    Care Discussed with Consultants : YES    Plan of care was discussed with patient and /or primary care giver; all questions and concerns were addressed and care was aligned with patient's wishes.

## 2018-05-20 NOTE — CHART NOTE - NSCHARTNOTEFT_GEN_A_CORE
Upon Nutritional Assessment by the Registered Dietitian your patient was determined to meet criteria / has evidence of the following diagnosis/diagnoses:          [ ]  Mild Protein Calorie Malnutrition        [ ]  Moderate Protein Calorie Malnutrition        [ ] Severe Protein Calorie Malnutrition        [ ] Unspecified Protein Calorie Malnutrition        [ ] Underweight / BMI <19        [ X ] Morbid Obesity / BMI > 40      Findings as based on:  •  Comprehensive nutrition assessment and consultation  •  Calorie counts (nutrient intake analysis)  •  Food acceptance and intake status from observations by staff  •  Follow up  •  Patient education  •  Intervention secondary to interdisciplinary rounds  •   concerns      Treatment:    The following diet has been recommended: change diet to Consistent CHO, DASH diet as medically feasible       PROVIDER Section:     By signing this assessment you are acknowledging and agree with the diagnosis/diagnoses assigned by the Registered Dietitian    Comments:

## 2018-05-20 NOTE — PROGRESS NOTE ADULT - PROBLEM SELECTOR PLAN 2
- f/u A1c : 11.9  - She takes metformin, trulicity, toujeo (36 units) Humalog (15 units) Jardiance  - Continue with Lantus 30 and humalog 13 tid , hss  - f/u with Dr. Stewart

## 2018-05-20 NOTE — DIETITIAN INITIAL EVALUATION ADULT. - FACTORS AFF FOOD INTAKE
Voodoo/ethnic/cultural/personal food preferences depression/Mormon/ethnic/cultural/personal food preferences

## 2018-05-20 NOTE — PROGRESS NOTE ADULT - SUBJECTIVE AND OBJECTIVE BOX
Patient was seen and examined  Patient is a 53y old  Female who presents with a chief complaint of foot ulcer and hyperglycemia (18 May 2018 11:18)      INTERVAL HPI/OVERNIGHT EVENTS:  T(C): 36.8 (05-20-18 @ 05:58), Max: 36.9 (05-20-18 @ 00:24)  HR: 64 (05-20-18 @ 05:58) (60 - 67)  BP: 114/76 (05-20-18 @ 05:58) (103/56 - 123/68)  RR: 18 (05-20-18 @ 05:58) (16 - 18)  SpO2: 95% (05-20-18 @ 05:58) (95% - 98%)  Wt(kg): --  I&O's Summary      LABS:                        13.3   7.4   )-----------( 241      ( 19 May 2018 07:50 )             41.4     05-19    141  |  104  |  10  ----------------------------<  155<H>  3.6   |  32<H>  |  0.82    Ca    9.1      19 May 2018 07:50  Phos  3.9     05-19  Mg     1.8     05-19          CAPILLARY BLOOD GLUCOSE      POCT Blood Glucose.: 138 mg/dL (19 May 2018 21:26)  POCT Blood Glucose.: 86 mg/dL (19 May 2018 16:28)  POCT Blood Glucose.: 216 mg/dL (19 May 2018 11:32)  POCT Blood Glucose.: 148 mg/dL (19 May 2018 08:38)              MEDICATIONS  (STANDING):  ampicillin/sulbactam  IVPB 3 Gram(s) IV Intermittent every 6 hours  aspirin enteric coated 81 milliGRAM(s) Oral daily  collagenase Ointment 1 Application(s) Topical once  enoxaparin Injectable 40 milliGRAM(s) SubCutaneous daily  hydrochlorothiazide 12.5 milliGRAM(s) Oral daily  insulin glargine Injectable (LANTUS) 30 Unit(s) SubCutaneous at bedtime  insulin lispro (HumaLOG) corrective regimen sliding scale   SubCutaneous three times a day before meals  insulin lispro Injectable (HumaLOG) 13 Unit(s) SubCutaneous three times a day before meals  lisinopril 10 milliGRAM(s) Oral daily  metoprolol succinate ER 25 milliGRAM(s) Oral daily  pantoprazole    Tablet 40 milliGRAM(s) Oral before breakfast  sertraline 100 milliGRAM(s) Oral daily  simvastatin 10 milliGRAM(s) Oral at bedtime  sodium chloride 0.9%. 1000 milliLiter(s) (100 mL/Hr) IV Continuous <Continuous>    MEDICATIONS  (PRN):  acetaminophen   Tablet. 650 milliGRAM(s) Oral every 6 hours PRN Mild Pain (1 - 3)  traMADol 25 milliGRAM(s) Oral every 6 hours PRN Moderate Pain (4 - 6)      RADIOLOGY & ADDITIONAL TESTS:    Imaging Personally Reviewed:  [ ] YES  [ ] NO    REVIEW OF SYSTEMS:  CONSTITUTIONAL: No fever, weight loss, or fatigue  EYES: No eye pain, visual disturbances, or discharge  ENMT:  No difficulty hearing, tinnitus, vertigo; No sinus or throat pain  NECK: No pain or stiffness  BREASTS: No pain, masses, or nipple discharge  RESPIRATORY: No cough, wheezing, chills or hemoptysis; No shortness of breath  CARDIOVASCULAR: No chest pain, palpitations, dizziness, or leg swelling  GASTROINTESTINAL: No abdominal or epigastric pain. No nausea, vomiting, or hematemesis; No diarrhea or constipation. No melena or hematochezia.  GENITOURINARY: No dysuria, frequency, hematuria, or incontinence  NEUROLOGICAL: No headaches, memory loss, loss of strength, numbness, or tremors  SKIN: No itching, burning, rashes, or lesions   LYMPH NODES: No enlarged glands  ENDOCRINE: No heat or cold intolerance; No hair loss  MUSCULOSKELETAL: No joint pain or swelling; No muscle, back, or extremity pain  PSYCHIATRIC: No depression, anxiety, mood swings, or difficulty sleeping  HEME/LYMPH: No easy bruising, or bleeding gums  ALLERY AND IMMUNOLOGIC: No hives or eczema      Consultant(s) Notes Reviewed:  [ x ] YES  [ ] NO    PHYSICAL EXAM:  GENERAL: NAD, well-groomed, well-developed  HEAD:  Atraumatic, Normocephalic  EYES: EOMI, PERRLA, conjunctiva and sclera clear  ENMT: No tonsillar erythema, exudates, or enlargement; Moist mucous membranes, Good dentition, No lesions  NECK: Supple, No JVD, Normal thyroid  NERVOUS SYSTEM:  Alert & Oriented X3, Good concentration; Motor Strength 5/5 B/L upper and lower extremities; DTRs 2+ intact and symmetric  CHEST/LUNG: Clear to percussion bilaterally; No rales, rhonchi, wheezing, or rubs  HEART: Regular rate and rhythm; No murmurs, rubs, or gallops  ABDOMEN: Soft, Nontender, Nondistended; Bowel sounds present  EXTREMITIES:  2+ Peripheral Pulses, No clubbing, cyanosis, or edema  LYMPH: No lymphadenopathy noted  SKIN: No rashes or lesions    Care Discussed with Consultants/Other Providers [ x] YES  [ ] NO

## 2018-05-20 NOTE — PROGRESS NOTE ADULT - ASSESSMENT
A 53y year old Female with poorly  controlled Diabetes, right DFU with OM s/p 6 weeks IV ABx in Feb,2018, has been sent in to the ER from PMD's office for evaluation of Left great toe cellulitis and ketones in her urine. She has no fever or chills. The Xray of B/L foot shows no bony destruction. The ID consult requested to assist with further evaluation and antibiotic management.      # Left Great toe DFU with cellulitis - No OM  # Poorly controlled DM2    Would recommend:  1. Glycemic control as per Endo/primary  2. Continue Unasyn and oral doxycycline  3. May change to oral Augmentin 875 mg q 12hours and doxycycline 100 mg [id number], on discharge to continue until 5/30/18    d/w Patient    will follow the patient with you

## 2018-05-20 NOTE — PROGRESS NOTE ADULT - SUBJECTIVE AND OBJECTIVE BOX
Patient is seen and examined at the bed side, is afebrile. She is doing better. The MRI of foot shows no osteomyelitis.        REVIEW OF SYSTEMS: All other review systems are negative        ALLERGIES: Iodine/contrast        Vital Signs Last 24 Hrs  T(C): 36.6 (20 May 2018 15:20), Max: 36.9 (20 May 2018 00:24)  T(F): 97.9 (20 May 2018 15:20), Max: 98.5 (20 May 2018 00:24)  HR: 67 (20 May 2018 15:20) (56 - 67)  BP: 127/76 (20 May 2018 15:20) (103/56 - 127/76)  BP(mean): --  RR: 17 (20 May 2018 15:20) (17 - 18)  SpO2: 95% (20 May 2018 15:20) (95% - 97%)          PHYSICAL EXAM:  GENERAL: Not in distress  CVS: s1 and s2 present  RESP: Air entry B/L  GI: abdomen soft and nontender  EXT: Left great toe swelling and redness resolving, with clean ulcer  CNS: AAOX3          LABS:                        13.6   8.7   )-----------( 252      ( 20 May 2018 06:46 )             42.9                          13.3   7.4   )-----------( 241      ( 19 May 2018 07:50 )             41.4             05    142  |  106  |  10  ----------------------------<  135<H>  4.0   |  32<H>  |  0.76    Ca    9.0      20 May 2018 06:46  Phos  3.9     05-20  Mg     1.7     20          141  |  104  |  10  ----------------------------<  155<H>  3.6   |  32<H>  |  0.82    Ca    9.1      19 May 2018 07:50  Phos  3.9     05-19  Mg     1.8     19            Color: Yellow / Appearance: Clear / S.025 / pH: x  Gluc: x / Ketone: Negative  / Bili: Negative / Urobili: Negative   Blood: x / Protein: Negative / Nitrite: Negative   Leuk Esterase: Trace / RBC: 0-2 /HPF / WBC 0-2 /HPF   Sq Epi: x / Non Sq Epi: Moderate /HPF / Bacteria: Few /HPF      CAPILLARY BLOOD GLUCOSE      POCT Blood Glucose.: 204 mg/dL (16 May 2018 12:34)  POCT Blood Glucose.: 224 mg/dL (16 May 2018 12:15)        MEDICATIONS  (STANDING):  ampicillin/sulbactam  IVPB 3 Gram(s) IV Intermittent every 6 hours  aspirin enteric coated 81 milliGRAM(s) Oral daily  collagenase Ointment 1 Application(s) Topical once  enoxaparin Injectable 40 milliGRAM(s) SubCutaneous daily  hydrochlorothiazide 12.5 milliGRAM(s) Oral daily  insulin glargine Injectable (LANTUS) 30 Unit(s) SubCutaneous at bedtime  insulin lispro (HumaLOG) corrective regimen sliding scale   SubCutaneous three times a day before meals  insulin lispro Injectable (HumaLOG) 13 Unit(s) SubCutaneous three times a day before meals  lisinopril 10 milliGRAM(s) Oral daily  metoprolol succinate ER 25 milliGRAM(s) Oral daily  pantoprazole    Tablet 40 milliGRAM(s) Oral before breakfast  sertraline 100 milliGRAM(s) Oral daily  simvastatin 10 milliGRAM(s) Oral at bedtime  sodium chloride 0.9%. 1000 milliLiter(s) (100 mL/Hr) IV Continuous <Continuous>    MEDICATIONS  (PRN):  acetaminophen   Tablet. 650 milliGRAM(s) Oral every 6 hours PRN Mild Pain (1 - 3)  traMADol 25 milliGRAM(s) Oral every 6 hours PRN Moderate Pain (4 - 6)          RADIOLOGY & ADDITIONAL TESTS:    18 : MR Foot No Cont, Bilateral (18 @ 19:27) RIGHT FOOT: Findings of severe midfoot arthrosis, likely reflecting Charcot arthropathy. No definite evidence of osteomyelitis, however there is skin ulceration about the midfoot or forefoot and osteomyelitis would be difficult to exclude about the tarsometatarsal joints. Clinical correlation is requested in this regard.    LEFT FOOT: Transverse fracture through the base of the first distal phalanx with abnormal bone marrow edema pattern in both sides of the fracture site and in the distal first proximal phalanx as well as surrounding soft tissue swelling and fluid signal within the fracture site. Findings all likely reflect posttraumatic changes, however there is   skin ulceration clinically apparent in this region and osteomyelitis  would not be. Clinical correlation is requested in this regard.      18: Xray Foot AP + Lateral + Oblique, Bilat (18 @ 14:42)  No evidence of bony destruction.            MICROBIOLOGY DATA:        Culture - Blood (18 @ 23:49)    Specimen Source: .Blood Blood    Culture Results:   No growth to date.      Culture - Blood (18 @ 23:45)    Specimen Source: .Blood Blood    Culture Results:   No growth to date.

## 2018-05-20 NOTE — DIETITIAN INITIAL EVALUATION ADULT. - OTHER INFO
nutrition consult requested for education and assessment; lives at home with family; skin wounds noted; recent admission at [facility] nutrition consult requested for education and assessment; lives at home with family; skin wounds noted; recent admission noted; denied GI distress, chewing or swallowing problem at present, food choices obtained; h/o DM > 10y, knows what to eat for DM per pt, and not interested in diet education/nutrition information at present , denied recent drastic wt changes, + or - 5 to 10 lb fluctuated per pt, wt data from [place]: 289 lb 2/18/18-->276 lb 5/16/18

## 2018-05-21 VITALS
RESPIRATION RATE: 18 BRPM | TEMPERATURE: 98 F | DIASTOLIC BLOOD PRESSURE: 74 MMHG | HEART RATE: 68 BPM | SYSTOLIC BLOOD PRESSURE: 132 MMHG | OXYGEN SATURATION: 98 %

## 2018-05-21 LAB
ANION GAP SERPL CALC-SCNC: 3 MMOL/L — LOW (ref 5–17)
BASOPHILS # BLD AUTO: 0.1 K/UL — SIGNIFICANT CHANGE UP (ref 0–0.2)
BASOPHILS NFR BLD AUTO: 1 % — SIGNIFICANT CHANGE UP (ref 0–2)
BUN SERPL-MCNC: 12 MG/DL — SIGNIFICANT CHANGE UP (ref 7–18)
CALCIUM SERPL-MCNC: 9.3 MG/DL — SIGNIFICANT CHANGE UP (ref 8.4–10.5)
CHLORIDE SERPL-SCNC: 106 MMOL/L — SIGNIFICANT CHANGE UP (ref 96–108)
CO2 SERPL-SCNC: 32 MMOL/L — HIGH (ref 22–31)
CREAT SERPL-MCNC: 0.94 MG/DL — SIGNIFICANT CHANGE UP (ref 0.5–1.3)
EOSINOPHIL # BLD AUTO: 0.8 K/UL — HIGH (ref 0–0.5)
EOSINOPHIL NFR BLD AUTO: 9.2 % — HIGH (ref 0–6)
GLUCOSE BLDC GLUCOMTR-MCNC: 125 MG/DL — HIGH (ref 70–99)
GLUCOSE BLDC GLUCOMTR-MCNC: 164 MG/DL — HIGH (ref 70–99)
GLUCOSE SERPL-MCNC: 135 MG/DL — HIGH (ref 70–99)
HCT VFR BLD CALC: 40.8 % — SIGNIFICANT CHANGE UP (ref 34.5–45)
HGB BLD-MCNC: 13.2 G/DL — SIGNIFICANT CHANGE UP (ref 11.5–15.5)
LYMPHOCYTES # BLD AUTO: 3.1 K/UL — SIGNIFICANT CHANGE UP (ref 1–3.3)
LYMPHOCYTES # BLD AUTO: 35.6 % — SIGNIFICANT CHANGE UP (ref 13–44)
MAGNESIUM SERPL-MCNC: 1.8 MG/DL — SIGNIFICANT CHANGE UP (ref 1.6–2.6)
MCHC RBC-ENTMCNC: 28.5 PG — SIGNIFICANT CHANGE UP (ref 27–34)
MCHC RBC-ENTMCNC: 32.2 GM/DL — SIGNIFICANT CHANGE UP (ref 32–36)
MCV RBC AUTO: 88.4 FL — SIGNIFICANT CHANGE UP (ref 80–100)
MONOCYTES # BLD AUTO: 0.7 K/UL — SIGNIFICANT CHANGE UP (ref 0–0.9)
MONOCYTES NFR BLD AUTO: 7.9 % — SIGNIFICANT CHANGE UP (ref 2–14)
NEUTROPHILS # BLD AUTO: 4.1 K/UL — SIGNIFICANT CHANGE UP (ref 1.8–7.4)
NEUTROPHILS NFR BLD AUTO: 46.3 % — SIGNIFICANT CHANGE UP (ref 43–77)
PHOSPHATE SERPL-MCNC: 4 MG/DL — SIGNIFICANT CHANGE UP (ref 2.5–4.5)
PLATELET # BLD AUTO: 250 K/UL — SIGNIFICANT CHANGE UP (ref 150–400)
POTASSIUM SERPL-MCNC: 3.8 MMOL/L — SIGNIFICANT CHANGE UP (ref 3.5–5.3)
POTASSIUM SERPL-SCNC: 3.8 MMOL/L — SIGNIFICANT CHANGE UP (ref 3.5–5.3)
RBC # BLD: 4.62 M/UL — SIGNIFICANT CHANGE UP (ref 3.8–5.2)
RBC # FLD: 13 % — SIGNIFICANT CHANGE UP (ref 10.3–14.5)
SODIUM SERPL-SCNC: 141 MMOL/L — SIGNIFICANT CHANGE UP (ref 135–145)
WBC # BLD: 8.8 K/UL — SIGNIFICANT CHANGE UP (ref 3.8–10.5)
WBC # FLD AUTO: 8.8 K/UL — SIGNIFICANT CHANGE UP (ref 3.8–10.5)

## 2018-05-21 PROCEDURE — 86140 C-REACTIVE PROTEIN: CPT

## 2018-05-21 PROCEDURE — 85730 THROMBOPLASTIN TIME PARTIAL: CPT

## 2018-05-21 PROCEDURE — 80048 BASIC METABOLIC PNL TOTAL CA: CPT

## 2018-05-21 PROCEDURE — 73718 MRI LOWER EXTREMITY W/O DYE: CPT

## 2018-05-21 PROCEDURE — 82962 GLUCOSE BLOOD TEST: CPT

## 2018-05-21 PROCEDURE — 99285 EMERGENCY DEPT VISIT HI MDM: CPT | Mod: 25

## 2018-05-21 PROCEDURE — 87040 BLOOD CULTURE FOR BACTERIA: CPT

## 2018-05-21 PROCEDURE — 83605 ASSAY OF LACTIC ACID: CPT

## 2018-05-21 PROCEDURE — 82009 KETONE BODYS QUAL: CPT

## 2018-05-21 PROCEDURE — 83036 HEMOGLOBIN GLYCOSYLATED A1C: CPT

## 2018-05-21 PROCEDURE — 84100 ASSAY OF PHOSPHORUS: CPT

## 2018-05-21 PROCEDURE — 85610 PROTHROMBIN TIME: CPT

## 2018-05-21 PROCEDURE — 85027 COMPLETE CBC AUTOMATED: CPT

## 2018-05-21 PROCEDURE — 80053 COMPREHEN METABOLIC PANEL: CPT

## 2018-05-21 PROCEDURE — 96374 THER/PROPH/DIAG INJ IV PUSH: CPT

## 2018-05-21 PROCEDURE — 81001 URINALYSIS AUTO W/SCOPE: CPT

## 2018-05-21 PROCEDURE — 73630 X-RAY EXAM OF FOOT: CPT

## 2018-05-21 PROCEDURE — 85652 RBC SED RATE AUTOMATED: CPT

## 2018-05-21 PROCEDURE — 83735 ASSAY OF MAGNESIUM: CPT

## 2018-05-21 RX ORDER — CETIRIZINE HYDROCHLORIDE 10 MG/1
1 TABLET ORAL
Qty: 0 | Refills: 0 | COMMUNITY

## 2018-05-21 RX ADMIN — Medication 1: at 08:32

## 2018-05-21 RX ADMIN — LISINOPRIL 10 MILLIGRAM(S): 2.5 TABLET ORAL at 05:54

## 2018-05-21 RX ADMIN — Medication 25 MILLIGRAM(S): at 05:54

## 2018-05-21 RX ADMIN — Medication 13 UNIT(S): at 08:32

## 2018-05-21 RX ADMIN — Medication 650 MILLIGRAM(S): at 00:10

## 2018-05-21 RX ADMIN — ENOXAPARIN SODIUM 40 MILLIGRAM(S): 100 INJECTION SUBCUTANEOUS at 12:27

## 2018-05-21 RX ADMIN — Medication 81 MILLIGRAM(S): at 12:27

## 2018-05-21 RX ADMIN — Medication 13 UNIT(S): at 12:27

## 2018-05-21 RX ADMIN — Medication 100 MILLIGRAM(S): at 05:54

## 2018-05-21 RX ADMIN — Medication 12.5 MILLIGRAM(S): at 05:54

## 2018-05-21 RX ADMIN — SERTRALINE 100 MILLIGRAM(S): 25 TABLET, FILM COATED ORAL at 12:27

## 2018-05-21 RX ADMIN — AMPICILLIN SODIUM AND SULBACTAM SODIUM 500 GRAM(S): 250; 125 INJECTION, POWDER, FOR SUSPENSION INTRAMUSCULAR; INTRAVENOUS at 05:54

## 2018-05-21 RX ADMIN — AMPICILLIN SODIUM AND SULBACTAM SODIUM 500 GRAM(S): 250; 125 INJECTION, POWDER, FOR SUSPENSION INTRAMUSCULAR; INTRAVENOUS at 12:27

## 2018-05-21 RX ADMIN — PANTOPRAZOLE SODIUM 40 MILLIGRAM(S): 20 TABLET, DELAYED RELEASE ORAL at 05:54

## 2018-05-21 NOTE — PROGRESS NOTE ADULT - SUBJECTIVE AND OBJECTIVE BOX
Interval Events:  pt in nad    Allergies    iodinated radiocontrast agents (Hives)    Intolerances      Endocrine/Metabolic Medications:  insulin glargine Injectable (LANTUS) 30 Unit(s) SubCutaneous at bedtime  insulin lispro (HumaLOG) corrective regimen sliding scale   SubCutaneous three times a day before meals  insulin lispro Injectable (HumaLOG) 13 Unit(s) SubCutaneous three times a day before meals  simvastatin 10 milliGRAM(s) Oral at bedtime      Vital Signs Last 24 Hrs  T(C): 36.8 (21 May 2018 08:08), Max: 36.8 (21 May 2018 08:08)  T(F): 98.2 (21 May 2018 08:08), Max: 98.2 (21 May 2018 08:08)  HR: 68 (21 May 2018 08:08) (58 - 68)  BP: 132/74 (21 May 2018 08:08) (118/70 - 165/90)  BP(mean): --  RR: 18 (21 May 2018 08:08) (17 - 18)  SpO2: 98% (21 May 2018 08:08) (95% - 98%)      PHYSICAL EXAM  All physical exam findings normal, except those marked:  General:	Alert, active, cooperative, NAD, well hydrated  .		[] Abnormal:  Neck		Normal: supple, no cervical adenopathy, no palpable thyroid  .		[] Abnormal:  Cardiovascular	Normal: regular rate, normal S1, S2, no murmurs  .		[] Abnormal:  Respiratory	Normal: no chest wall deformity, normal respiratory pattern, CTA B/L  .		[] Abnormal:  Abdominal	Normal: soft, ND, NT, bowel sounds present, no masses, no organomegaly  .		[] Abnormal:  		Normal normal genitalia, testes descended, circumcised/uncircumcised  .		Liz stage:			Breast liz:  .		Menstrual history:  .		[] Abnormal:  Extremities	Normal: FROM x4  .		[] Abnormal:  Skin		Normal: intact and not indurated, no rash, no acanthosis nigricans  .		[] Abnormal:  Neurologic	Normal: grossly intact  .		[] Abnormal:    LABS                        13.2   8.8   )-----------( 250      ( 21 May 2018 06:05 )             40.8                               141    |  106    |  12                  Calcium: 9.3   / iCa: x      (05-21 @ 06:05)    ----------------------------<  135       Magnesium: 1.8                              3.8     |  32     |  0.94             Phosphorous: 4.0        CAPILLARY BLOOD GLUCOSE      POCT Blood Glucose.: 164 mg/dL (21 May 2018 08:22)  POCT Blood Glucose.: 135 mg/dL (20 May 2018 21:05)  POCT Blood Glucose.: 109 mg/dL (20 May 2018 16:29)  POCT Blood Glucose.: 160 mg/dL (20 May 2018 11:45)        Assesment/plan   Dm- good control  cont lantus 30 and humalog 12 ac tid  restart trulicity and metformin as out pt  fsg ac and hs   tight control in lieu of foot ulcer  podiatry f/u

## 2018-05-21 NOTE — PROGRESS NOTE ADULT - SUBJECTIVE AND OBJECTIVE BOX
PGY 1 Note discussed with supervising resident and primary attending    Patient is a 53y old  Female who presents with a chief complaint of foot ulcer and hyperglycemia (18 May 2018 11:18)      INTERVAL HPI/OVERNIGHT EVENTS: offers no new complaints; current symptoms resolving    MEDICATIONS  (STANDING):  ampicillin/sulbactam  IVPB 3 Gram(s) IV Intermittent every 6 hours  aspirin enteric coated 81 milliGRAM(s) Oral daily  collagenase Ointment 1 Application(s) Topical once  doxycycline hyclate Capsule 100 milliGRAM(s) Oral every 12 hours  enoxaparin Injectable 40 milliGRAM(s) SubCutaneous daily  hydrochlorothiazide 12.5 milliGRAM(s) Oral daily  insulin glargine Injectable (LANTUS) 30 Unit(s) SubCutaneous at bedtime  insulin lispro (HumaLOG) corrective regimen sliding scale   SubCutaneous three times a day before meals  insulin lispro Injectable (HumaLOG) 13 Unit(s) SubCutaneous three times a day before meals  lisinopril 10 milliGRAM(s) Oral daily  metoprolol succinate ER 25 milliGRAM(s) Oral daily  pantoprazole    Tablet 40 milliGRAM(s) Oral before breakfast  sertraline 100 milliGRAM(s) Oral daily  simvastatin 10 milliGRAM(s) Oral at bedtime  sodium chloride 0.9%. 1000 milliLiter(s) (100 mL/Hr) IV Continuous <Continuous>    MEDICATIONS  (PRN):  acetaminophen   Tablet. 650 milliGRAM(s) Oral every 6 hours PRN Mild Pain (1 - 3)  traMADol 25 milliGRAM(s) Oral every 6 hours PRN Moderate Pain (4 - 6)      __________________________________________________  REVIEW OF SYSTEMS:    CONSTITUTIONAL: No fever,   EYES: no acute visual disturbances  NECK: No pain or stiffness  RESPIRATORY: No cough; No shortness of breath  CARDIOVASCULAR: No chest pain, no palpitations  GASTROINTESTINAL: No pain. No nausea or vomiting; No diarrhea   NEUROLOGICAL: No headache or numbness, no tremors  MUSCULOSKELETAL: No joint pain, no muscle pain  GENITOURINARY: no dysuria, no frequency, no hesitancy  PSYCHIATRY: no depression , no anxiety  ALL OTHER  ROS negative        Vital Signs Last 24 Hrs  T(C): 36.6 (21 May 2018 05:16), Max: 36.6 (20 May 2018 15:20)  T(F): 97.9 (21 May 2018 05:16), Max: 97.9 (20 May 2018 15:20)  HR: 58 (21 May 2018 05:16) (56 - 67)  BP: 118/70 (21 May 2018 05:16) (118/70 - 165/90)  BP(mean): --  RR: 18 (21 May 2018 05:16) (17 - 18)  SpO2: 97% (21 May 2018 05:16) (95% - 97%)    ________________________________________________  PHYSICAL EXAM:  GENERAL: NAD  HEENT: Normocephalic;  conjunctivae and sclerae clear; moist mucous membranes;   NECK : supple  CHEST/LUNG: Clear to auscultation bilaterally with good air entry   HEART: S1 S2  regular; no murmurs, gallops or rubs  ABDOMEN: Soft, Nontender, Nondistended; Bowel sounds present  EXTREMITIES: Ulceration Left Hallux distal plantar: granular in nature, - purulence - malodor +edema +erythema around hallux  Ulceration Right distal digital 3rd toe: small lesion, fibrotic in nature, negative signs of infection  Ulceration Right distal digital 4th toe: granular in nature, no signs of infection, presence of edema   Vascular: Dorsalis Pedis and Posterior Tibial pulses 2/4.  Capillary re-fill time less then 3 seconds digits 1-5 bilateral.   Right arm bone fusion surgery recently , has sling and plaster   SKIN: warm and dry; no rash  NERVOUS SYSTEM:  Awake and alert; Oriented  to place, person and time ; no new deficits    _________________________________________________  LABS:                        13.2   8.8   )-----------( 250      ( 21 May 2018 06:05 )             40.8     05-21    141  |  106  |  12  ----------------------------<  135<H>  3.8   |  32<H>  |  0.94    Ca    9.3      21 May 2018 06:05  Phos  4.0     05-21  Mg     1.8     05-21          CAPILLARY BLOOD GLUCOSE      POCT Blood Glucose.: 135 mg/dL (20 May 2018 21:05)  POCT Blood Glucose.: 109 mg/dL (20 May 2018 16:29)  POCT Blood Glucose.: 160 mg/dL (20 May 2018 11:45)  POCT Blood Glucose.: 127 mg/dL (20 May 2018 08:22)        RADIOLOGY & ADDITIONAL TESTS:    < from: MR Foot No Cont, Bilateral (05.18.18 @ 19:27) >  RIGHT FOOT: Findings of severe midfoot arthrosis, likely reflecting   Charcot arthropathy. No definite evidence of osteomyelitis, however there   is skin ulceration about the midfoot or forefoot and osteomyelitis would   be difficult to exclude about the tarsometatarsal joints. Clinical   correlation is requested in this regard.    LEFT FOOT: Transverse fracture through the base of the first distal   phalanx with abnormal bone marrow edema pattern in both sides of the   fracture site and in the distal first proximal phalanx as well as   surrounding soft tissue swelling and fluid signal within the fracture   site. Findings all likely reflect posttraumatic changes, however there is   skin ulceration clinically apparent in this region and osteomyelitis   would not be. Clinical correlation is requested in this regard.      < end of copied text >      Imaging Personally Reviewed:  YES    Consultant(s) Notes Reviewed:   YES    Care Discussed with Consultants : YES    Plan of care was discussed with patient and /or primary care giver; all questions and concerns were addressed and care was aligned with patient's wishes.

## 2018-05-21 NOTE — PROGRESS NOTE ADULT - SUBJECTIVE AND OBJECTIVE BOX
Patient is seen and examined at the bed side, is afebrile. She has no complaints. The WBC count stay normal.        REVIEW OF SYSTEMS: All other review systems are negative        ALLERGIES: Iodine/contrast        Vital Signs Last 24 Hrs  T(C): 36.8 (21 May 2018 08:08), Max: 36.8 (21 May 2018 08:08)  T(F): 98.2 (21 May 2018 08:08), Max: 98.2 (21 May 2018 08:08)  HR: 68 (21 May 2018 08:08) (58 - 68)  BP: 132/74 (21 May 2018 08:08) (118/70 - 165/90)  BP(mean): --  RR: 18 (21 May 2018 08:08) (17 - 18)  SpO2: 98% (21 May 2018 08:08) (95% - 98%)          PHYSICAL EXAM:  GENERAL: Not in distress  CVS: s1 and s2 present  RESP: Air entry B/L  GI: abdomen soft and nontender  EXT: Left great toe swelling and redness resolving, with clean ulcer  CNS: AAOX3          LABS:                                  13.2   8.8   )-----------( 250      ( 21 May 2018 06:05 )             40.8                13.6   8.7   )-----------( 252      ( 20 May 2018 06:46 )             42.9              05-21    141  |  106  |  12  ----------------------------<  135<H>  3.8   |  32<H>  |  0.94    Ca    9.3      21 May 2018 06:05  Phos  4.0     05-21  Mg     1.8     21      05-20    142  |  106  |  10  ----------------------------<  135<H>  4.0   |  32<H>  |  0.76    Ca    9.0      20 May 2018 06:46  Phos  3.9     05-20  Mg     1.7     05-20            Color: Yellow / Appearance: Clear / S.025 / pH: x  Gluc: x / Ketone: Negative  / Bili: Negative / Urobili: Negative   Blood: x / Protein: Negative / Nitrite: Negative   Leuk Esterase: Trace / RBC: 0-2 /HPF / WBC 0-2 /HPF   Sq Epi: x / Non Sq Epi: Moderate /HPF / Bacteria: Few /HPF      CAPILLARY BLOOD GLUCOSE      POCT Blood Glucose.: 204 mg/dL (16 May 2018 12:34)  POCT Blood Glucose.: 224 mg/dL (16 May 2018 12:15)          MEDICATIONS  (STANDING):  ampicillin/sulbactam  IVPB 3 Gram(s) IV Intermittent every 6 hours  aspirin enteric coated 81 milliGRAM(s) Oral daily  collagenase Ointment 1 Application(s) Topical once  doxycycline hyclate Capsule 100 milliGRAM(s) Oral every 12 hours  enoxaparin Injectable 40 milliGRAM(s) SubCutaneous daily  hydrochlorothiazide 12.5 milliGRAM(s) Oral daily  insulin glargine Injectable (LANTUS) 30 Unit(s) SubCutaneous at bedtime  insulin lispro (HumaLOG) corrective regimen sliding scale   SubCutaneous three times a day before meals  insulin lispro Injectable (HumaLOG) 13 Unit(s) SubCutaneous three times a day before meals  lisinopril 10 milliGRAM(s) Oral daily  metoprolol succinate ER 25 milliGRAM(s) Oral daily  pantoprazole    Tablet 40 milliGRAM(s) Oral before breakfast  sertraline 100 milliGRAM(s) Oral daily  simvastatin 10 milliGRAM(s) Oral at bedtime  sodium chloride 0.9%. 1000 milliLiter(s) (100 mL/Hr) IV Continuous <Continuous>    MEDICATIONS  (PRN):  acetaminophen   Tablet. 650 milliGRAM(s) Oral every 6 hours PRN Mild Pain (1 - 3)  traMADol 25 milliGRAM(s) Oral every 6 hours PRN Moderate Pain (4 - 6)          RADIOLOGY & ADDITIONAL TESTS:    18 : MR Foot No Cont, Bilateral (18 @ 19:27) RIGHT FOOT: Findings of severe midfoot arthrosis, likely reflecting Charcot arthropathy. No definite evidence of osteomyelitis, however there is skin ulceration about the midfoot or forefoot and osteomyelitis would be difficult to exclude about the tarsometatarsal joints. Clinical correlation is requested in this regard.    LEFT FOOT: Transverse fracture through the base of the first distal phalanx with abnormal bone marrow edema pattern in both sides of the fracture site and in the distal first proximal phalanx as well as surrounding soft tissue swelling and fluid signal within the fracture site. Findings all likely reflect posttraumatic changes, however there is   skin ulceration clinically apparent in this region and osteomyelitis  would not be. Clinical correlation is requested in this regard.      18: Xray Foot AP + Lateral + Oblique, Bilat (18 @ 14:42)  No evidence of bony destruction.            MICROBIOLOGY DATA:        Culture - Blood (18 @ 23:49)    Specimen Source: .Blood Blood    Culture Results:   No growth to date.      Culture - Blood (18 @ 23:45)    Specimen Source: .Blood Blood    Culture Results:   No growth to date.

## 2018-05-21 NOTE — PROGRESS NOTE ADULT - PROBLEM SELECTOR PLAN 1
Chronic foot ulcer  s/p 6 weeks IV abx in March   Podiatry consulted  Will need MRI b/l toes to rule out Osteomyelitis   Continue with Unasyn and Clindamycin   - ID- Dr Boo
Chronic foot ulcer  s/p 6 weeks IV abx in March   Podiatry consulted  Based on MRI results , Patient has Left foot hallux fracture with edematous tissue surrounding it , however MRI cannot be ruled out ?  Will follow up with ID , Podiatry   Clinically suspicion for Osteo is low as no fever , no white count , ESR near normal   Continue with Unasyn alone as per ID  - ID- Dr Boo
Chronic foot ulcer  s/p 6 weeks IV abx in March   Podiatry consulted  Based on MRI results , Patient has Left foot hallux fracture with edematous tissue surrounding it , however Osteomyelitis cannot be ruled out ?   Will follow up with ID , Podiatry   Clinically suspicion for Osteo is low as no fever , no white count , ESR near normal   Continue with Unasyn alone as per ID  - ID- Dr Boo
Chronic foot ulcer  s/p 6 weeks IV abx in March   Podiatry consulted  Will need MRI b/l toes to rule out Osteomyelitis   Continue with Unasyn and Clindamycin   - ID- Dr Boo
Chronic foot ulcer  s/p 6 weeks IV abx in March   Podiatry consulted  Will need MRI b/l toes to rule out Osteomyelitis   Continue with Unasyn and Clindamycin   - ID- Dr Boo
Chronic foot ulcer  s/p 6 weeks IV abx in March   Podiatry consulted  Will need MRI b/l toes to rule out Osteomyelitis   Continue with Unasyn   - ID- Dr Boo  change to po as per id dc planing pending social service

## 2018-05-21 NOTE — PROGRESS NOTE ADULT - PROVIDER SPECIALTY LIST ADULT
Endocrinology
Infectious Disease
Internal Medicine
Podiatry
Podiatry
Internal Medicine
Internal Medicine

## 2018-05-21 NOTE — PROGRESS NOTE ADULT - SUBJECTIVE AND OBJECTIVE BOX
S : 53y year old Female seen at bedside for bilateral digital ulcerations. Patient is NAD and AAO x 3.     Chief Complaint : Patient is a 53y old  Female who presents with a chief complaint of bilateral digital ulcerations     Patient admits to  (-) Fevers, (-) Chills, (-) Nausea, (-) Vomiting, (-) Shortness of Breath      PMH: Diabetic foot ulcer  HTN (hypertension)  Diabetes    PSH:S/P carpal tunnel release  S/P anal fissurectomy  S/P       Allergies:iodinated radiocontrast agents (Hives)      O:   General: Pleasant  female NAD & AOX3.    Integument:  Skin warm, dry and supple bilateral.    Ulceration Left Hallux distal plantar: granular in nature, - purulence - malodor +edema +erythema around hallux  Ulceration Right distal digital 3rd toe: small lesion, fibrotic in nature, negative signs of infection  Ulceration Right distal digital 4th toe: granular in nature, no signs of infection, presence of edema   Vascular: Dorsalis Pedis and Posterior Tibial pulses 2/4.  Capillary re-fill time less then 3 seconds digits 1-5 bilateral.    Neuro: Protective sensation diminished to the level of the digits bilateral.  MSK: Muscle strength 5/5 all major muscle groups bilateral.    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   Wound dressed with DSD  Wound Care Instructions for Left Hallux and right 4th digit: 1. Cleanse wound with sterile saline 2. Apply santyl to wound site 3. Apply DSD 4. Apply van   X-rays reviewed  SANDHYA reviewed   Continue IV antibiotics as per ID, patient had recent orthopedic surgery on right shoulder with screw fixation, continue IV antibiotics for further infection   No weight bearing restrictions BL   Patient is podiatry stable for discharge and can follow up with us at [address] [place] [address] [place] on Tuesday/ or personal podiatrist   Discussed with Dr. Suggs

## 2018-05-21 NOTE — PROGRESS NOTE ADULT - PROBLEM SELECTOR PROBLEM 1
Diabetic foot ulcer
Statement Selected

## 2018-05-21 NOTE — PROGRESS NOTE ADULT - PROBLEM SELECTOR PLAN 3
c/w HCTz, Metoprolol, and lisinopril with parameters

## 2018-05-21 NOTE — PROGRESS NOTE ADULT - ASSESSMENT
A 53y year old Female with poorly  controlled Diabetes, right DFU with OM s/p 6 weeks IV ABx in Feb,2018, has been sent in to the ER from PMD's office for evaluation of Left great toe cellulitis and ketones in her urine. She has no fever or chills. The Xray of B/L foot shows no bony destruction. The ID consult requested to assist with further evaluation and antibiotic management.      # Left Great toe DFU with cellulitis - No OM  # Poorly controlled DM2    Would recommend:  1. OOb to chair  2. Glycemic control as per Endo/primary  3. Continue Unasyn and oral doxycycline  4. May change to oral Augmentin 875 mg q 12hours and doxycycline 100 mg [id number], on discharge to continue until 5/30/18    d/w Patient

## 2018-05-22 LAB
CULTURE RESULTS: SIGNIFICANT CHANGE UP
CULTURE RESULTS: SIGNIFICANT CHANGE UP
SPECIMEN SOURCE: SIGNIFICANT CHANGE UP
SPECIMEN SOURCE: SIGNIFICANT CHANGE UP

## 2018-07-09 NOTE — ED PROVIDER NOTE - OBJECTIVE STATEMENT
52 yr old female with hx of HTN, HLD, DM and ? asthma? presents to ed c/o SOB and wheezing x 1 wk worsening.  no fever, no cp, no palpitations, no smoking hx, no diaphoresis, no n/v.  pt was at MD office today and given albuterol x 2 nebs and 80mg solumedrol IM.  EMS arrived and gave pt 2 duoneb and pt showed significant improvement.  speaking clearly in full sentences and good air entry.  denies any uri sx, smoke exposure, dvt/pe, recent surgery. English

## 2018-10-30 ENCOUNTER — TRANSCRIPTION ENCOUNTER (OUTPATIENT)
Age: 53
End: 2018-10-30

## 2019-01-17 NOTE — ED PROVIDER NOTE - DURATION
I will STOP taking the medications listed below when I get home from the hospital:  None year(s)/several years

## 2019-12-16 PROBLEM — E11.621 TYPE 2 DIABETES MELLITUS WITH FOOT ULCER: Chronic | Status: ACTIVE | Noted: 2018-05-16

## 2020-01-17 ENCOUNTER — APPOINTMENT (OUTPATIENT)
Dept: HEART AND VASCULAR | Facility: CLINIC | Age: 55
End: 2020-01-17

## 2023-07-10 NOTE — ED PROVIDER NOTE - PRINCIPAL DIAGNOSIS
Patient reports being out of her percocet and doesn't get a refill on her pain medication until Wednesday,  states she is having a flare of her trigeminal neuralgia     Diabetic foot infection
